# Patient Record
Sex: MALE | Race: WHITE | NOT HISPANIC OR LATINO | Employment: OTHER | URBAN - METROPOLITAN AREA
[De-identification: names, ages, dates, MRNs, and addresses within clinical notes are randomized per-mention and may not be internally consistent; named-entity substitution may affect disease eponyms.]

---

## 2017-05-05 PROBLEM — E66.9 MILD OBESITY: Status: ACTIVE | Noted: 2017-05-05

## 2018-01-12 ENCOUNTER — OFFICE VISIT (OUTPATIENT)
Dept: CARDIOLOGY | Facility: CLINIC | Age: 68
End: 2018-01-12
Attending: INTERNAL MEDICINE
Payer: OTHER MISCELLANEOUS

## 2018-01-12 VITALS
BODY MASS INDEX: 30.66 KG/M2 | SYSTOLIC BLOOD PRESSURE: 119 MMHG | HEART RATE: 57 BPM | WEIGHT: 207 LBS | HEIGHT: 69 IN | DIASTOLIC BLOOD PRESSURE: 61 MMHG

## 2018-01-12 DIAGNOSIS — I10 ESSENTIAL HYPERTENSION: ICD-10-CM

## 2018-01-12 DIAGNOSIS — I25.10 CORONARY ARTERY DISEASE INVOLVING NATIVE CORONARY ARTERY OF NATIVE HEART WITHOUT ANGINA PECTORIS: ICD-10-CM

## 2018-01-12 DIAGNOSIS — I44.7 LEFT BUNDLE BRANCH BLOCK: ICD-10-CM

## 2018-01-12 DIAGNOSIS — K21.9 GASTROESOPHAGEAL REFLUX DISEASE WITHOUT ESOPHAGITIS: ICD-10-CM

## 2018-01-12 DIAGNOSIS — E66.9 MILD OBESITY: ICD-10-CM

## 2018-01-12 DIAGNOSIS — Z95.5 HISTORY OF CORONARY ARTERY STENT PLACEMENT: ICD-10-CM

## 2018-01-12 DIAGNOSIS — Z79.01 CHRONIC ANTICOAGULATION: ICD-10-CM

## 2018-01-12 DIAGNOSIS — E11.59 TYPE 2 DIABETES MELLITUS WITH OTHER CIRCULATORY COMPLICATION, WITHOUT LONG-TERM CURRENT USE OF INSULIN: ICD-10-CM

## 2018-01-12 DIAGNOSIS — I35.9 AORTIC VALVE DISEASE: ICD-10-CM

## 2018-01-12 DIAGNOSIS — Z95.2 HISTORY OF HEART VALVE REPLACEMENT: ICD-10-CM

## 2018-01-12 DIAGNOSIS — I50.22 HEART FAILURE, SYSTOLIC, CHRONIC: ICD-10-CM

## 2018-01-12 DIAGNOSIS — E78.00 HYPERCHOLESTEROLEMIA: ICD-10-CM

## 2018-01-12 LAB — INR PPP: 2.4 (ref 2–3)

## 2018-01-12 PROCEDURE — 85610 PROTHROMBIN TIME: CPT | Mod: ,,, | Performed by: INTERNAL MEDICINE

## 2018-01-12 PROCEDURE — 99214 OFFICE O/P EST MOD 30 MIN: CPT | Mod: S$GLB,,, | Performed by: INTERNAL MEDICINE

## 2018-01-12 NOTE — PROGRESS NOTES
Subjective:     Kanu Carrero is a 67 y.o. male with hypertension, hypercholesterolemia and diabetes mellitus type 2. He is mildly obese. He has coronary artery disease with mild left main disease and a history of a RCA intervention in 2008. He has left bundle branch block. He had moderate to severe aortic stenosis and in the fall of 2013 developed mild exertional chest pressure. He underwent cardiac catheterization on 1/30/2014 which revealed an aortic valve area of 1.1 cm2 and no obstructive coronary artery disease was seen with about 40% left main stenosis. There was mild left ventricular dysfunction. He underwent aortic valve replacement receiving a [employer] Mechanical Valve on 3/11/2014. He was then begun on warfarin with anticoagulation managed by Dr. Delgadillo. He was diagnosed with iron deficiency anemia in 10/2016 and underwent EGD and colonoscopy that he says were negative. It was decided to stop aspirin that he was then taking in addition to being anticoagulated. No exertional chest discomfort or exertional dyspnea. No palpitations or weak spells. No bleeding. Feeling well overall.    Coronary Artery Disease   Presents for follow-up visit. The disease course has been stable. Pertinent negatives include no chest pain, chest pressure, chest tightness, dizziness, leg swelling, muscle weakness, palpitations, shortness of breath or weight gain. Risk factors include diabetes, hyperlipidemia, hypertension and obesity. Risk factors do not include decreased physical activity. His past medical history is significant for CHF. There is no history of arrhythmia. The symptoms have been stable.   Congestive Heart Failure   Presents for follow-up visit. The disease course has been stable. Pertinent negatives include no abdominal pain, chest pain, chest pressure, claudication, edema, fatigue, muscle weakness, near-syncope, nocturia, orthopnea, palpitations, paroxysmal nocturnal dyspnea, shortness of breath or unexpected  weight change. The symptoms have been stable. His past medical history is significant for CAD, DM and HTN. There is no history of arrhythmia or chronic lung disease.   Hypertension   This is a chronic problem. The current episode started more than 1 year ago. The problem is unchanged. The problem is controlled (usually 120-130/60-70 mmHg at home). Pertinent negatives include no anxiety, blurred vision, chest pain, headaches, malaise/fatigue, neck pain, orthopnea, palpitations, peripheral edema, PND, shortness of breath or sweats. There is no history of chronic renal disease.   Hyperlipidemia   This is a chronic problem. The current episode started more than 1 year ago. The problem is controlled. Recent lipid tests were reviewed and are normal. Exacerbating diseases include diabetes and obesity. He has no history of chronic renal disease, hypothyroidism, liver disease or nephrotic syndrome. Pertinent negatives include no chest pain, focal sensory loss, focal weakness, leg pain, myalgias or shortness of breath.       Review of Systems   Constitution: Negative for chills, fatigue, fever, malaise/fatigue, unexpected weight change and weight gain.   HENT: Negative for hoarse voice and nosebleeds.    Eyes: Negative for blurred vision, pain, vision loss in left eye and vision loss in right eye.   Cardiovascular: Negative for chest pain, claudication, dyspnea on exertion, irregular heartbeat, leg swelling, near-syncope, orthopnea, palpitations, paroxysmal nocturnal dyspnea and syncope.   Respiratory: Negative for chest tightness, cough, hemoptysis, shortness of breath and wheezing.    Endocrine: Negative for cold intolerance and heat intolerance.   Hematologic/Lymphatic: Negative for bleeding problem. Does not bruise/bleed easily.   Skin: Negative for color change and rash.   Musculoskeletal: Negative for back pain, falls, gout, muscle weakness, myalgias and neck pain.   Gastrointestinal: Negative for abdominal pain,  "heartburn, hematemesis, hematochezia, hemorrhoids, jaundice, melena, nausea and vomiting.   Genitourinary: Negative for dysuria, hematuria and nocturia.   Neurological: Negative for dizziness, focal weakness, headaches, light-headedness, loss of balance, numbness, paresthesias and vertigo.   Psychiatric/Behavioral: Negative for altered mental status, depression and memory loss. The patient is not nervous/anxious.    Allergic/Immunologic: Negative for hives and persistent infections.       Current Outpatient Prescriptions on File Prior to Visit   Medication Sig Dispense Refill    amlodipine (NORVASC) 10 MG tablet Take 1 tablet (10 mg total) by mouth once daily. 90 tablet 3    amoxicillin (AMOXIL) 500 MG Tab   0    chlorthalidone (HYGROTEN) 25 MG Tab Take 1 tablet (25 mg total) by mouth once daily. 45 tablet 3    escitalopram oxalate (LEXAPRO) 10 MG tablet Take 10 mg by mouth once daily.  4    ferrous sulfate 325 mg (65 mg iron) Tab tablet Take 325 mg by mouth once daily.  3    metformin (GLUCOPHAGE) 500 MG tablet Take 500 mg by mouth 2 (two) times daily with meals.   4    metoprolol succinate (TOPROL-XL) 50 MG 24 hr tablet Take 1 tablet (50 mg total) by mouth once daily. 90 tablet 3    MULTIVITAMIN W-MINERALS/LUTEIN (CENTRUM SILVER ORAL) Take by mouth.        omeprazole (PRILOSEC) 20 MG capsule Take 20 mg by mouth once daily.   11    oxycodone-acetaminophen 5-325 mg (PERCOCET) 5-325 mg per tablet Take 1 tablet by mouth every 4 (four) hours as needed for Pain. 40 tablet 0    ramipril (ALTACE) 10 MG capsule Take 1 capsule (10 mg total) by mouth once daily. 90 capsule 3    rosuvastatin (CRESTOR) 10 MG tablet Take 1 tablet (10 mg total) by mouth once daily. 90 tablet 3    warfarin (COUMADIN) 3 MG tablet DOSE TO BE ADJUSTED ACCORDING TO INR. 140 tablet 3     No current facility-administered medications on file prior to visit.        /61   Pulse (!) 57   Ht 5' 9" (1.753 m)   Wt 93.9 kg (207 lb)   BMI " 30.57 kg/m²       Objective:     Physical Exam   Constitutional: He is oriented to person, place, and time. He appears well-developed and well-nourished. He does not appear ill. No distress.   HENT:   Head: Normocephalic and atraumatic.   Nose: Nose normal.   Eyes: Right eye exhibits no discharge. Left eye exhibits no discharge. Right conjunctiva is not injected. Left conjunctiva is not injected. Right pupil is round. Left pupil is round. Pupils are equal.   Neck: Neck supple. No JVD present. Carotid bruit is not present. No thyroid mass and no thyromegaly present.   Cardiovascular: Normal rate, regular rhythm and S1 normal.   No extrasystoles are present. PMI is not displaced.  Exam reveals no gallop.    Murmur heard.   Harsh midsystolic murmur is present with a grade of 3/6  at the upper right sternal border  Pulses:       Radial pulses are 2+ on the right side, and 2+ on the left side.        Dorsalis pedis pulses are 2+ on the right side, and 2+ on the left side.        Posterior tibial pulses are 2+ on the right side, and 2+ on the left side.   Mechanical S2.   Pulmonary/Chest: Effort normal and breath sounds normal.   Abdominal: Soft. Normal appearance. There is no hepatosplenomegaly. There is no tenderness.   Musculoskeletal:        Right ankle: He exhibits swelling. He exhibits no ecchymosis and no deformity.        Left ankle: He exhibits swelling. He exhibits no ecchymosis and no deformity.   Lymphadenopathy:        Head (right side): No submandibular adenopathy present.        Head (left side): No submandibular adenopathy present.     He has no cervical adenopathy.   Neurological: He is alert and oriented to person, place, and time. He is not disoriented. No cranial nerve deficit or sensory deficit.   Skin: Skin is warm, dry and intact. No rash noted. He is not diaphoretic. No cyanosis. Nails show no clubbing.   Psychiatric: He has a normal mood and affect. His speech is normal and behavior is normal.  Judgment and thought content normal. Cognition and memory are normal.        Assessment:     1. Coronary artery disease involving native coronary artery of native heart without angina pectoris    2. History of coronary artery stent placement    3. Heart failure, systolic, chronic    4. Left bundle branch block    5. Aortic valve disease    6. History of heart valve replacement    7. Chronic anticoagulation    8. Essential hypertension    9. Hypercholesterolemia    10. Type 2 diabetes mellitus with other circulatory complication, without long-term current use of insulin    11. Mild obesity    12. Gastroesophageal reflux disease without esophagitis        Plan:     1. Coronary Artery Disease   2008: [facility]: Cath: RCA: Stent.   6/8/2010: [facility] [place]: Cath: LM 40%. RCA: Stent patent. EF 40-45%.              No aspirin as he has iron deficiency anemia.              Stable.    2. Heart Failure, Systolic and Diastolic, Chronic   2005: Diagnosed. EF 40-45%.   5/22/2013: Echo: EF 50-55%.   5/28/2014: Echo: Normal left ventricular size and function. Mild LVH. Mildly dilated LA. Mechanical aortic valve - 2.4 m/s - mild AR.   9/8/2016: Echo: Normal left ventricular size and systolic function. Mild LVH. Moderate diastolic dysfunction. LBBB. Moderately dilated LA. Mechanical AVR fine - 2.7 m/s.              On ramipril 10 mg Q24 and metoprolol 50 mg Q24.              Well compensated.     3. Left Bundle Branch Block              2005: Diagnosed.   9/8/2017: SB. LBBB.  ms.              Stable.                4. Aortic Valve Disease              5/22/2013: Echo: Mild AS 3.0 m/s.             Fall 2013: Mild to moderate exertional chest pressure.              1/21/2014: Echo: Normal LV size with low normal LV function. EF 50-55%. LBBB. Moderate AS - 3.2 m/s - 1.2 cm2. Mild to moderate MR.              1/21/2014: Carotid Duplex: Mild plaquing.              1/30/2014: [facility] [place]: Cath: Severe aortic stenosis - 1.1 cm2. Mean gradient 47 mm Hg.  Mild CAD. Mild LV dysfunction with EF 50%.              3/11/2014: [facility] [place]: AVR: [employer] Mechanical Valve - 23 mm.   5/28/2014: Echo: Mechanical aortic valve - 2.4 m/s - mild AR.   On warfarin.   Knows about endocarditis prophylaxis.   Stable.    5. Chronic Anticoagulation   3/2014: Began warfarin for mechanical aortic valve. Goal INR 2-3. Anticoagulation managed by Dr. Delgadillo.    Was on aspirin 81 mg as well but stopped due to iron deficiency anemia.   INR followed.     6. Hypertension              2005: Diagnosed.   On ramipril 10 mg Q24, amlodipine 10 mg Q24, metoprolol 50 mg Q24 and chlorthalidone 12.5 mg Q24.   Leg edema resolved with diuretic.   Keeping log at home.   Well controlled at home.     7. Hypercholesterolemia   2007: Began statin.              On atorvastatin 80 mg Q24.   12/16/2016: Chol 134. HDL 35. LDL 74. .   May have had some muscle aches while on atorvastatin.    On rosuvastatin 10 mg Q24.   9/13/2017: Chol 158. HDL 33. LDL 96. .   On rosuvastatin 10 mg Q24.    Quite well controlled.   May consider rosuvastatin 20 mg Q24.    8. Diabetes Mellitus, Type 2   3/2014: Diagnosed. Complications: CAD. Medications: Oral agent.   On metformin 500 mg Q12.   Tells me well controlled.    9. Mild Obesity   5/5/2017: Weight 97 kg. BMI 32.   9/8/2017: Weight 95 kg. BMI 31.   Encouraged to lose weight.    10. Gastroesophageal Reflux Disease   2008: Diagnosed.   On omeprazole 20 mg Q24.    11. Anemia   10/2016: Diagnosed with iron deficiency anemia.   10/4/2016: EGD & Colonoscopy: Negative.   Receiving iron.    12. Primary Care              Dr. Uriah Vallecillo.    F/u 4 months.    Sharad Delgadillo M.D.

## 2018-02-16 ENCOUNTER — TELEPHONE (OUTPATIENT)
Dept: CARDIOLOGY | Facility: CLINIC | Age: 68
End: 2018-02-16

## 2018-02-16 ENCOUNTER — CLINICAL SUPPORT (OUTPATIENT)
Dept: CARDIOLOGY | Facility: CLINIC | Age: 68
End: 2018-02-16
Payer: OTHER MISCELLANEOUS

## 2018-02-16 DIAGNOSIS — Z79.01 CHRONIC ANTICOAGULATION: ICD-10-CM

## 2018-02-16 DIAGNOSIS — Z95.2 HISTORY OF HEART VALVE REPLACEMENT: Primary | ICD-10-CM

## 2018-02-16 DIAGNOSIS — Z95.2 HISTORY OF HEART VALVE REPLACEMENT: ICD-10-CM

## 2018-02-16 PROCEDURE — 85610 PROTHROMBIN TIME: CPT | Mod: ,,, | Performed by: INTERNAL MEDICINE

## 2018-02-19 LAB — INR PPP: 3.2 (ref 2–3)

## 2018-03-02 ENCOUNTER — CLINICAL SUPPORT (OUTPATIENT)
Dept: CARDIOLOGY | Facility: CLINIC | Age: 68
End: 2018-03-02
Payer: OTHER MISCELLANEOUS

## 2018-03-02 DIAGNOSIS — Z95.2 HISTORY OF HEART VALVE REPLACEMENT: ICD-10-CM

## 2018-03-02 DIAGNOSIS — Z79.01 CHRONIC ANTICOAGULATION: ICD-10-CM

## 2018-03-02 LAB — INR PPP: 2.4 (ref 2–3)

## 2018-03-02 PROCEDURE — 85610 PROTHROMBIN TIME: CPT | Mod: QW,,, | Performed by: INTERNAL MEDICINE

## 2018-03-27 DIAGNOSIS — I50.22 HEART FAILURE, SYSTOLIC, CHRONIC: ICD-10-CM

## 2018-03-27 RX ORDER — CHLORTHALIDONE 25 MG/1
25 TABLET ORAL DAILY
Qty: 90 TABLET | Refills: 3 | Status: SHIPPED | OUTPATIENT
Start: 2018-03-27 | End: 2018-06-01 | Stop reason: SDUPTHER

## 2018-04-03 ENCOUNTER — CLINICAL SUPPORT (OUTPATIENT)
Dept: CARDIOLOGY | Facility: CLINIC | Age: 68
End: 2018-04-03
Payer: OTHER MISCELLANEOUS

## 2018-04-03 DIAGNOSIS — Z79.01 CHRONIC ANTICOAGULATION: Primary | ICD-10-CM

## 2018-04-03 LAB — INR PPP: 2.1 (ref 2–3)

## 2018-04-03 PROCEDURE — 85610 PROTHROMBIN TIME: CPT | Mod: QW,,, | Performed by: INTERNAL MEDICINE

## 2018-05-02 ENCOUNTER — CLINICAL SUPPORT (OUTPATIENT)
Dept: CARDIOLOGY | Facility: CLINIC | Age: 68
End: 2018-05-02
Payer: OTHER MISCELLANEOUS

## 2018-05-02 DIAGNOSIS — Z79.01 CHRONIC ANTICOAGULATION: ICD-10-CM

## 2018-05-02 DIAGNOSIS — Z95.2 HISTORY OF HEART VALVE REPLACEMENT: ICD-10-CM

## 2018-05-02 DIAGNOSIS — Z79.01 CHRONIC ANTICOAGULATION: Primary | ICD-10-CM

## 2018-05-02 LAB — INR PPP: 2.1 (ref 2–3)

## 2018-05-02 PROCEDURE — 85610 PROTHROMBIN TIME: CPT | Mod: QW,S$GLB,, | Performed by: INTERNAL MEDICINE

## 2018-06-01 ENCOUNTER — OFFICE VISIT (OUTPATIENT)
Dept: CARDIOLOGY | Facility: CLINIC | Age: 68
End: 2018-06-01
Attending: INTERNAL MEDICINE
Payer: OTHER MISCELLANEOUS

## 2018-06-01 VITALS
DIASTOLIC BLOOD PRESSURE: 58 MMHG | HEART RATE: 49 BPM | WEIGHT: 202 LBS | HEIGHT: 69 IN | SYSTOLIC BLOOD PRESSURE: 107 MMHG | BODY MASS INDEX: 29.92 KG/M2

## 2018-06-01 DIAGNOSIS — Z95.2 HISTORY OF HEART VALVE REPLACEMENT: ICD-10-CM

## 2018-06-01 DIAGNOSIS — E78.00 HYPERCHOLESTEROLEMIA: ICD-10-CM

## 2018-06-01 DIAGNOSIS — E11.59 TYPE 2 DIABETES MELLITUS WITH OTHER CIRCULATORY COMPLICATION, WITHOUT LONG-TERM CURRENT USE OF INSULIN: ICD-10-CM

## 2018-06-01 DIAGNOSIS — I44.7 LEFT BUNDLE BRANCH BLOCK: ICD-10-CM

## 2018-06-01 DIAGNOSIS — Z79.01 CHRONIC ANTICOAGULATION: ICD-10-CM

## 2018-06-01 DIAGNOSIS — I10 ESSENTIAL HYPERTENSION: ICD-10-CM

## 2018-06-01 DIAGNOSIS — E66.3 OVERWEIGHT: ICD-10-CM

## 2018-06-01 DIAGNOSIS — I50.22 HEART FAILURE, SYSTOLIC, CHRONIC: ICD-10-CM

## 2018-06-01 DIAGNOSIS — I50.22 CHRONIC SYSTOLIC CONGESTIVE HEART FAILURE: ICD-10-CM

## 2018-06-01 DIAGNOSIS — K21.9 GASTROESOPHAGEAL REFLUX DISEASE WITHOUT ESOPHAGITIS: ICD-10-CM

## 2018-06-01 DIAGNOSIS — I35.9 AORTIC VALVE DISEASE: ICD-10-CM

## 2018-06-01 DIAGNOSIS — I25.10 CORONARY ARTERY DISEASE INVOLVING NATIVE CORONARY ARTERY OF NATIVE HEART WITHOUT ANGINA PECTORIS: ICD-10-CM

## 2018-06-01 DIAGNOSIS — Z95.5 HISTORY OF CORONARY ARTERY STENT PLACEMENT: ICD-10-CM

## 2018-06-01 LAB — INR PPP: 1.9 (ref 2–3)

## 2018-06-01 PROCEDURE — 85610 PROTHROMBIN TIME: CPT | Mod: QW,,, | Performed by: INTERNAL MEDICINE

## 2018-06-01 PROCEDURE — 99214 OFFICE O/P EST MOD 30 MIN: CPT | Mod: 25,S$GLB,, | Performed by: INTERNAL MEDICINE

## 2018-06-01 RX ORDER — CHLORTHALIDONE 25 MG/1
25 TABLET ORAL DAILY
Qty: 90 TABLET | Refills: 3 | Status: SHIPPED | OUTPATIENT
Start: 2018-06-01 | End: 2018-10-19 | Stop reason: SDUPTHER

## 2018-06-01 RX ORDER — WARFARIN 3 MG/1
TABLET ORAL
Qty: 140 TABLET | Refills: 3 | Status: SHIPPED | OUTPATIENT
Start: 2018-06-01 | End: 2019-06-18 | Stop reason: SDUPTHER

## 2018-06-01 RX ORDER — RAMIPRIL 10 MG/1
10 CAPSULE ORAL DAILY
Qty: 90 CAPSULE | Refills: 3 | Status: SHIPPED | OUTPATIENT
Start: 2018-06-01 | End: 2018-06-01 | Stop reason: SDUPTHER

## 2018-06-01 RX ORDER — AMLODIPINE BESYLATE 10 MG/1
10 TABLET ORAL DAILY
Qty: 90 TABLET | Refills: 3 | Status: SHIPPED | OUTPATIENT
Start: 2018-06-01 | End: 2018-10-19 | Stop reason: SDUPTHER

## 2018-06-01 RX ORDER — ROSUVASTATIN CALCIUM 20 MG/1
20 TABLET, COATED ORAL DAILY
Qty: 90 TABLET | Refills: 3 | Status: SHIPPED | OUTPATIENT
Start: 2018-06-01 | End: 2018-10-19 | Stop reason: SDUPTHER

## 2018-06-01 RX ORDER — AMLODIPINE BESYLATE 10 MG/1
10 TABLET ORAL DAILY
Qty: 90 TABLET | Refills: 3 | Status: SHIPPED | OUTPATIENT
Start: 2018-06-01 | End: 2018-06-01 | Stop reason: SDUPTHER

## 2018-06-01 RX ORDER — METOPROLOL SUCCINATE 50 MG/1
50 TABLET, EXTENDED RELEASE ORAL DAILY
Qty: 90 TABLET | Refills: 3 | Status: SHIPPED | OUTPATIENT
Start: 2018-06-01 | End: 2018-10-17 | Stop reason: SDUPTHER

## 2018-06-01 RX ORDER — ROSUVASTATIN CALCIUM 20 MG/1
20 TABLET, COATED ORAL DAILY
Qty: 90 TABLET | Refills: 3 | Status: SHIPPED | OUTPATIENT
Start: 2018-06-01 | End: 2018-06-01 | Stop reason: SDUPTHER

## 2018-06-01 RX ORDER — CHLORTHALIDONE 25 MG/1
25 TABLET ORAL DAILY
Qty: 90 TABLET | Refills: 3 | Status: SHIPPED | OUTPATIENT
Start: 2018-06-01 | End: 2018-06-01 | Stop reason: SDUPTHER

## 2018-06-01 RX ORDER — METOPROLOL SUCCINATE 50 MG/1
50 TABLET, EXTENDED RELEASE ORAL DAILY
Qty: 90 TABLET | Refills: 3 | Status: SHIPPED | OUTPATIENT
Start: 2018-06-01 | End: 2018-06-01 | Stop reason: SDUPTHER

## 2018-06-01 RX ORDER — WARFARIN 3 MG/1
TABLET ORAL
Qty: 140 TABLET | Refills: 3 | Status: SHIPPED | OUTPATIENT
Start: 2018-06-01 | End: 2018-06-01 | Stop reason: SDUPTHER

## 2018-06-01 RX ORDER — RAMIPRIL 10 MG/1
10 CAPSULE ORAL DAILY
Qty: 90 CAPSULE | Refills: 3 | Status: SHIPPED | OUTPATIENT
Start: 2018-06-01 | End: 2018-10-17 | Stop reason: SDUPTHER

## 2018-06-01 NOTE — PROGRESS NOTES
Subjective:     Kanu Carrero is a 68 y.o. male with hypertension, hypercholesterolemia and diabetes mellitus type 2. He is overweight but used to be mildly obese. He has coronary artery disease with mild left main disease and a history of a RCA intervention in 2008. He has left bundle branch block. He had moderate to severe aortic stenosis and in the fall of 2013 developed mild exertional chest pressure. He underwent cardiac catheterization on 1/30/2014 which revealed an aortic valve area of 1.1 cm2 and no obstructive coronary artery disease was seen with about 40% left main stenosis. There was mild left ventricular dysfunction. He underwent aortic valve replacement receiving a [employer] Mechanical Valve on 3/11/2014. He was then begun on warfarin with anticoagulation managed by Dr. Delgadillo. He was diagnosed with iron deficiency anemia in 10/2016 and underwent EGD and colonoscopy that he says were negative. It was decided to stop aspirin that he was then taking in addition to being anticoagulated. No exertional chest discomfort or exertional dyspnea. No palpitations or weak spells. No bleeding. Feeling well overall.    Coronary Artery Disease   Presents for follow-up visit. The disease course has been stable. Pertinent negatives include no chest pain, chest pressure, chest tightness, dizziness, leg swelling, muscle weakness, palpitations, shortness of breath or weight gain. Risk factors include diabetes, hyperlipidemia, hypertension and obesity. Risk factors do not include decreased physical activity. His past medical history is significant for CHF. There is no history of arrhythmia. The symptoms have been stable.   Congestive Heart Failure   Presents for follow-up visit. The disease course has been stable. Pertinent negatives include no abdominal pain, chest pain, chest pressure, claudication, edema, fatigue, muscle weakness, near-syncope, nocturia, orthopnea, palpitations, paroxysmal nocturnal dyspnea,  shortness of breath or unexpected weight change. The symptoms have been stable. His past medical history is significant for CAD, DM and HTN. There is no history of arrhythmia or chronic lung disease.   Hypertension   This is a chronic problem. The current episode started more than 1 year ago. The problem is unchanged. The problem is controlled (usually 110-120/55-60 mmHg at home). Pertinent negatives include no anxiety, blurred vision, chest pain, headaches, malaise/fatigue, neck pain, orthopnea, palpitations, peripheral edema, PND, shortness of breath or sweats. There is no history of chronic renal disease.   Hyperlipidemia   This is a chronic problem. The current episode started more than 1 year ago. The problem is controlled. Recent lipid tests were reviewed and are normal. Exacerbating diseases include diabetes and obesity. He has no history of chronic renal disease, hypothyroidism, liver disease or nephrotic syndrome. Pertinent negatives include no chest pain, focal sensory loss, focal weakness, leg pain, myalgias or shortness of breath.       Review of Systems   Constitution: Negative for chills, fatigue, fever, malaise/fatigue, unexpected weight change and weight gain.   HENT: Negative for hoarse voice and nosebleeds.    Eyes: Negative for blurred vision, pain, vision loss in left eye and vision loss in right eye.   Cardiovascular: Negative for chest pain, claudication, dyspnea on exertion, irregular heartbeat, leg swelling, near-syncope, orthopnea, palpitations, paroxysmal nocturnal dyspnea and syncope.   Respiratory: Negative for chest tightness, cough, hemoptysis, shortness of breath and wheezing.    Endocrine: Negative for cold intolerance and heat intolerance.   Hematologic/Lymphatic: Negative for bleeding problem. Does not bruise/bleed easily.   Skin: Negative for color change and rash.   Musculoskeletal: Negative for back pain, falls, gout, muscle weakness, myalgias and neck pain.   Gastrointestinal:  "Negative for abdominal pain, heartburn, hematemesis, hematochezia, hemorrhoids, jaundice, melena, nausea and vomiting.   Genitourinary: Negative for dysuria, hematuria and nocturia.   Neurological: Negative for dizziness, focal weakness, headaches, light-headedness, loss of balance, numbness, paresthesias and vertigo.   Psychiatric/Behavioral: Negative for altered mental status, depression and memory loss. The patient is not nervous/anxious.    Allergic/Immunologic: Negative for hives and persistent infections.       Current Outpatient Prescriptions on File Prior to Visit   Medication Sig Dispense Refill    amlodipine (NORVASC) 10 MG tablet Take 1 tablet (10 mg total) by mouth once daily. 90 tablet 3    amoxicillin (AMOXIL) 500 MG Tab   0    chlorthalidone (HYGROTEN) 25 MG Tab Take 1 tablet (25 mg total) by mouth once daily. 90 tablet 3    escitalopram oxalate (LEXAPRO) 10 MG tablet Take 10 mg by mouth once daily.  4    ferrous sulfate 325 mg (65 mg iron) Tab tablet Take 325 mg by mouth once daily.  3    metformin (GLUCOPHAGE) 500 MG tablet Take 500 mg by mouth 2 (two) times daily with meals.   4    metoprolol succinate (TOPROL-XL) 50 MG 24 hr tablet Take 1 tablet (50 mg total) by mouth once daily. 90 tablet 3    MULTIVITAMIN W-MINERALS/LUTEIN (CENTRUM SILVER ORAL) Take by mouth.        omeprazole (PRILOSEC) 20 MG capsule Take 20 mg by mouth once daily.   11    oxycodone-acetaminophen 5-325 mg (PERCOCET) 5-325 mg per tablet Take 1 tablet by mouth every 4 (four) hours as needed for Pain. 40 tablet 0    ramipril (ALTACE) 10 MG capsule Take 1 capsule (10 mg total) by mouth once daily. 90 capsule 3    rosuvastatin (CRESTOR) 10 MG tablet Take 1 tablet (10 mg total) by mouth once daily. 90 tablet 3    warfarin (COUMADIN) 3 MG tablet DOSE TO BE ADJUSTED ACCORDING TO INR. 140 tablet 3     No current facility-administered medications on file prior to visit.        BP (!) 107/58   Pulse (!) 49   Ht 5' 9" (1.753 " m)   Wt 91.6 kg (202 lb)   BMI 29.83 kg/m²       Objective:     Physical Exam   Constitutional: He is oriented to person, place, and time. He appears well-developed and well-nourished. He does not appear ill. No distress.   HENT:   Head: Normocephalic and atraumatic.   Nose: Nose normal.   Eyes: Right eye exhibits no discharge. Left eye exhibits no discharge. Right conjunctiva is not injected. Left conjunctiva is not injected. Right pupil is round. Left pupil is round. Pupils are equal.   Neck: Neck supple. No JVD present. Carotid bruit is not present. No thyroid mass and no thyromegaly present.   Cardiovascular: Normal rate, regular rhythm and S1 normal.   No extrasystoles are present. PMI is not displaced.  Exam reveals no gallop.    Murmur heard.   Harsh midsystolic murmur is present with a grade of 3/6  at the upper right sternal border  Pulses:       Radial pulses are 2+ on the right side, and 2+ on the left side.        Dorsalis pedis pulses are 2+ on the right side, and 2+ on the left side.        Posterior tibial pulses are 2+ on the right side, and 2+ on the left side.   Mechanical S2.   Pulmonary/Chest: Effort normal and breath sounds normal.   Abdominal: Soft. Normal appearance. There is no hepatosplenomegaly. There is no tenderness.   Musculoskeletal:        Right ankle: He exhibits swelling. He exhibits no ecchymosis and no deformity.        Left ankle: He exhibits swelling. He exhibits no ecchymosis and no deformity.   Lymphadenopathy:        Head (right side): No submandibular adenopathy present.        Head (left side): No submandibular adenopathy present.     He has no cervical adenopathy.   Neurological: He is alert and oriented to person, place, and time. He is not disoriented. No cranial nerve deficit.   Skin: Skin is warm, dry and intact. No rash noted. He is not diaphoretic. Nails show no clubbing.   Psychiatric: He has a normal mood and affect. His speech is normal and behavior is normal.  Judgment and thought content normal. Cognition and memory are normal.        Assessment:     1. Coronary artery disease involving native coronary artery of native heart without angina pectoris    2. History of coronary artery stent placement    3. Heart failure, systolic, chronic    4. Left bundle branch block    5. Aortic valve disease    6. Chronic anticoagulation    7. History of heart valve replacement    8. Essential hypertension    9. Hypercholesterolemia    10. Type 2 diabetes mellitus with other circulatory complication, without long-term current use of insulin    11. Overweight    12. Gastroesophageal reflux disease without esophagitis        Plan:     1. Coronary Artery Disease   2008: [facility]: Cath: RCA: Stent.   6/8/2010: [facility] [place]: [place]: LM 40%. RCA: Stent patent. EF 40-45%.              No aspirin as he has iron deficiency anemia.              Stable.    2. Heart Failure, Systolic and Diastolic, Chronic   2005: Diagnosed. EF 40-45%.   5/22/2013: Echo: EF 50-55%.   5/28/2014: Echo: Normal left ventricular size and function. Mild LVH. Mildly dilated LA. Mechanical aortic valve - 2.4 m/s - mild AR.   9/8/2016: Echo: Normal left ventricular size and systolic function. Mild LVH. Moderate diastolic dysfunction. LBBB. Moderately dilated LA. Mechanical AVR fine - 2.7 m/s.              On ramipril 10 mg Q24 and metoprolol 50 mg Q24.              Well compensated.     3. Left Bundle Branch Block              2005: Diagnosed.   9/8/2017: SB. LBBB.  ms.              Stable.                4. Aortic Valve Disease              5/22/2013: Echo: Mild AS 3.0 m/s.             Fall 2013: Mild to moderate exertional chest pressure.              1/21/2014: Echo: Normal LV size with low normal LV function. EF 50-55%. LBBB. Moderate AS - 3.2 m/s - 1.2 cm2. Mild to moderate MR.              1/21/2014: Carotid Duplex: Mild plaquing.              1/30/2014: [facility] [place]: Cath: Severe aortic stenosis - 1.1 cm2. Mean gradient 47 mm Hg.  Mild CAD. Mild LV dysfunction with EF 50%.              3/11/2014: [facility] [place]: AVR: [employer] Mechanical Valve - 23 mm.   5/28/2014: Echo: Mechanical aortic valve - 2.4 m/s - mild AR.   On warfarin.   Knows about endocarditis prophylaxis.   Stable.    5. Chronic Anticoagulation   3/2014: Began warfarin for mechanical aortic valve. Goal INR 2-3. Anticoagulation managed by Dr. Delgadillo.    Was on aspirin 81 mg as well but stopped due to iron deficiency anemia.   6/1/2018: INR 1.9: Increase 4.5 mg 4/7 & 3 mg 3/7. Check INR 2 weeks.   INR followed.    No obvious bleeding.    6. Hypertension              2005: Diagnosed.   On ramipril 10 mg Q24, amlodipine 10 mg Q24, metoprolol 50 mg Q24 and chlorthalidone 12.5 mg Q24.   Leg edema resolved with diuretic.   Keeping log at home.   Well controlled at home.     7. Hypercholesterolemia   2007: Began statin.              On atorvastatin 80 mg Q24.   12/16/2016: Chol 134. HDL 35. LDL 74. .   May have had some muscle aches while on atorvastatin.    On rosuvastatin 10 mg Q24.   9/13/2017: Chol 158. HDL 33. LDL 96. .   On rosuvastatin 10 mg Q24.   Increase rosuvastatin to 20 mg Q24.    Quite well controlled.     8. Diabetes Mellitus, Type 2   3/2014: Diagnosed. Complications: CAD. Medications: Oral agent.   On metformin 500 mg Q12.   Tells me well controlled.    9. Overweight   5/5/2017: Weight 97 kg. BMI 32.   9/8/2017: Weight 95 kg. BMI 31.   6/1/2018: Weight 92 kg. BMI 30.    Encouraged to lose more weight.    10. Gastroesophageal Reflux Disease   2008: Diagnosed.   On omeprazole 20 mg Q24.    11. Anemia   10/2016: Diagnosed with iron deficiency anemia.   10/4/2016: EGD & Colonoscopy: Negative.   Receiving iron.    12. Primary Care              Dr. Uriah Vallecillo.    F/u 4 months.    Sharad Delgadillo M.D.

## 2018-06-15 ENCOUNTER — CLINICAL SUPPORT (OUTPATIENT)
Dept: CARDIOLOGY | Facility: CLINIC | Age: 68
End: 2018-06-15
Payer: OTHER MISCELLANEOUS

## 2018-06-15 DIAGNOSIS — Z79.01 CHRONIC ANTICOAGULATION: Primary | ICD-10-CM

## 2018-06-15 LAB — INR PPP: 2.4 (ref 2–3)

## 2018-06-15 PROCEDURE — 85610 PROTHROMBIN TIME: CPT | Mod: QW,,, | Performed by: INTERNAL MEDICINE

## 2018-07-17 ENCOUNTER — CLINICAL SUPPORT (OUTPATIENT)
Dept: CARDIOLOGY | Facility: CLINIC | Age: 68
End: 2018-07-17
Payer: OTHER MISCELLANEOUS

## 2018-07-17 DIAGNOSIS — Z79.01 CHRONIC ANTICOAGULATION: Primary | ICD-10-CM

## 2018-07-17 LAB — INR PPP: 2.7 (ref 2–3)

## 2018-07-17 PROCEDURE — 85610 PROTHROMBIN TIME: CPT | Mod: QW,,, | Performed by: INTERNAL MEDICINE

## 2018-08-14 ENCOUNTER — CLINICAL SUPPORT (OUTPATIENT)
Dept: CARDIOLOGY | Facility: CLINIC | Age: 68
End: 2018-08-14
Payer: OTHER MISCELLANEOUS

## 2018-08-14 DIAGNOSIS — Z79.01 CHRONIC ANTICOAGULATION: Primary | ICD-10-CM

## 2018-08-14 LAB
INR PPP: 1.9 (ref 2–3)
INR PPP: 1.9 (ref 2–3)

## 2018-08-14 PROCEDURE — 85610 PROTHROMBIN TIME: CPT | Mod: QW,,, | Performed by: INTERNAL MEDICINE

## 2018-09-11 ENCOUNTER — CLINICAL SUPPORT (OUTPATIENT)
Dept: CARDIOLOGY | Facility: CLINIC | Age: 68
End: 2018-09-11
Payer: OTHER MISCELLANEOUS

## 2018-09-11 DIAGNOSIS — Z79.01 CHRONIC ANTICOAGULATION: Primary | ICD-10-CM

## 2018-09-11 LAB — INR PPP: 3.3 (ref 2–3)

## 2018-09-11 PROCEDURE — 85610 PROTHROMBIN TIME: CPT | Mod: QW,,, | Performed by: INTERNAL MEDICINE

## 2018-09-25 ENCOUNTER — CLINICAL SUPPORT (OUTPATIENT)
Dept: CARDIOLOGY | Facility: CLINIC | Age: 68
End: 2018-09-25
Payer: OTHER MISCELLANEOUS

## 2018-09-25 DIAGNOSIS — Z79.01 CHRONIC ANTICOAGULATION: Primary | ICD-10-CM

## 2018-09-25 LAB — INR PPP: 2.1 (ref 2–3)

## 2018-09-25 PROCEDURE — 85610 PROTHROMBIN TIME: CPT | Mod: QW,,, | Performed by: INTERNAL MEDICINE

## 2018-10-17 DIAGNOSIS — I50.22 HEART FAILURE, SYSTOLIC, CHRONIC: ICD-10-CM

## 2018-10-17 DIAGNOSIS — I10 ESSENTIAL HYPERTENSION: ICD-10-CM

## 2018-10-17 RX ORDER — ROSUVASTATIN CALCIUM 10 MG/1
TABLET, COATED ORAL
Qty: 90 TABLET | Refills: 3 | Status: SHIPPED | OUTPATIENT
Start: 2018-10-17 | End: 2018-10-19

## 2018-10-17 RX ORDER — METOPROLOL SUCCINATE 50 MG/1
TABLET, EXTENDED RELEASE ORAL
Qty: 90 TABLET | Refills: 3 | Status: SHIPPED | OUTPATIENT
Start: 2018-10-17 | End: 2018-10-19 | Stop reason: SDUPTHER

## 2018-10-17 RX ORDER — RAMIPRIL 10 MG/1
CAPSULE ORAL
Qty: 90 CAPSULE | Refills: 3 | Status: SHIPPED | OUTPATIENT
Start: 2018-10-17 | End: 2018-10-19 | Stop reason: SDUPTHER

## 2018-10-19 ENCOUNTER — OFFICE VISIT (OUTPATIENT)
Dept: CARDIOLOGY | Facility: CLINIC | Age: 68
End: 2018-10-19
Attending: INTERNAL MEDICINE
Payer: OTHER MISCELLANEOUS

## 2018-10-19 VITALS
HEART RATE: 53 BPM | SYSTOLIC BLOOD PRESSURE: 102 MMHG | DIASTOLIC BLOOD PRESSURE: 54 MMHG | HEIGHT: 69 IN | WEIGHT: 197 LBS | BODY MASS INDEX: 29.18 KG/M2

## 2018-10-19 DIAGNOSIS — E78.00 HYPERCHOLESTEROLEMIA: ICD-10-CM

## 2018-10-19 DIAGNOSIS — Z95.5 HISTORY OF CORONARY ARTERY STENT PLACEMENT: ICD-10-CM

## 2018-10-19 DIAGNOSIS — I25.10 CORONARY ARTERY DISEASE INVOLVING NATIVE CORONARY ARTERY OF NATIVE HEART WITHOUT ANGINA PECTORIS: ICD-10-CM

## 2018-10-19 DIAGNOSIS — K21.9 GASTROESOPHAGEAL REFLUX DISEASE WITHOUT ESOPHAGITIS: ICD-10-CM

## 2018-10-19 DIAGNOSIS — I35.9 AORTIC VALVE DISEASE: ICD-10-CM

## 2018-10-19 DIAGNOSIS — I10 ESSENTIAL HYPERTENSION: ICD-10-CM

## 2018-10-19 DIAGNOSIS — E66.3 OVERWEIGHT: ICD-10-CM

## 2018-10-19 DIAGNOSIS — I50.22 HEART FAILURE, SYSTOLIC, CHRONIC: ICD-10-CM

## 2018-10-19 DIAGNOSIS — I44.7 LEFT BUNDLE BRANCH BLOCK: ICD-10-CM

## 2018-10-19 DIAGNOSIS — Z95.2 HISTORY OF HEART VALVE REPLACEMENT: ICD-10-CM

## 2018-10-19 DIAGNOSIS — Z79.01 CHRONIC ANTICOAGULATION: ICD-10-CM

## 2018-10-19 DIAGNOSIS — E11.59 TYPE 2 DIABETES MELLITUS WITH OTHER CIRCULATORY COMPLICATION, WITHOUT LONG-TERM CURRENT USE OF INSULIN: ICD-10-CM

## 2018-10-19 LAB — INR PPP: 2.8 (ref 2–3)

## 2018-10-19 PROCEDURE — 85610 PROTHROMBIN TIME: CPT | Mod: QW,,, | Performed by: INTERNAL MEDICINE

## 2018-10-19 PROCEDURE — 93000 ELECTROCARDIOGRAM COMPLETE: CPT | Mod: S$GLB,,, | Performed by: INTERNAL MEDICINE

## 2018-10-19 PROCEDURE — 99214 OFFICE O/P EST MOD 30 MIN: CPT | Mod: S$GLB,,, | Performed by: INTERNAL MEDICINE

## 2018-10-19 RX ORDER — CHLORTHALIDONE 25 MG/1
25 TABLET ORAL DAILY
Qty: 90 TABLET | Refills: 3 | Status: SHIPPED | OUTPATIENT
Start: 2018-10-19 | End: 2019-11-01 | Stop reason: SDUPTHER

## 2018-10-19 RX ORDER — AMLODIPINE BESYLATE 10 MG/1
10 TABLET ORAL DAILY
Qty: 90 TABLET | Refills: 3 | Status: SHIPPED | OUTPATIENT
Start: 2018-10-19 | End: 2019-03-30

## 2018-10-19 RX ORDER — ROSUVASTATIN CALCIUM 20 MG/1
20 TABLET, COATED ORAL DAILY
Qty: 90 TABLET | Refills: 3 | Status: SHIPPED | OUTPATIENT
Start: 2018-10-19 | End: 2019-11-01 | Stop reason: SDUPTHER

## 2018-10-19 RX ORDER — RAMIPRIL 10 MG/1
10 CAPSULE ORAL DAILY
Qty: 90 CAPSULE | Refills: 3 | Status: SHIPPED | OUTPATIENT
Start: 2018-10-19 | End: 2019-11-01 | Stop reason: SDUPTHER

## 2018-10-19 RX ORDER — METOPROLOL SUCCINATE 50 MG/1
50 TABLET, EXTENDED RELEASE ORAL DAILY
Qty: 90 TABLET | Refills: 3 | Status: SHIPPED | OUTPATIENT
Start: 2018-10-19 | End: 2019-03-01

## 2018-10-19 NOTE — PROGRESS NOTES
Subjective:     Kanu Carrero is a 68 y.o. male with hypertension, hypercholesterolemia and diabetes mellitus type 2. He is overweight but used to be mildly obese. He has coronary artery disease with mild left main disease and a history of a RCA intervention in 2008. He has left bundle branch block. He had moderate to severe aortic stenosis and in the fall of 2013 developed mild exertional chest pressure. He underwent cardiac catheterization on 1/30/2014 which revealed an aortic valve area of 1.1 cm2 and no obstructive coronary artery disease was seen with about 40% left main stenosis. There was mild left ventricular dysfunction. He underwent aortic valve replacement receiving a [employer] Mechanical Valve on 3/11/2014. He was then begun on warfarin with anticoagulation managed by Dr. Delgadillo. He was diagnosed with iron deficiency anemia in 10/2016 and underwent EGD and colonoscopy that he says were negative. It was decided to stop aspirin that he was then taking in addition to being anticoagulated. No exertional chest discomfort or exertional dyspnea. No palpitations or weak spells. No bleeding. Feeling well overall.    Coronary Artery Disease   Presents for follow-up visit. The disease course has been stable. Pertinent negatives include no chest pain, chest pressure, chest tightness, dizziness, leg swelling, muscle weakness, palpitations, shortness of breath or weight gain. Risk factors include diabetes, hyperlipidemia, hypertension and obesity. Risk factors do not include decreased physical activity. His past medical history is significant for CHF. There is no history of arrhythmia. The symptoms have been stable.   Congestive Heart Failure   Presents for follow-up visit. The disease course has been stable. Pertinent negatives include no abdominal pain, chest pain, chest pressure, claudication, edema, fatigue, muscle weakness, near-syncope, nocturia, orthopnea, palpitations, paroxysmal nocturnal dyspnea,  shortness of breath or unexpected weight change. The symptoms have been stable. His past medical history is significant for CAD, DM and HTN. There is no history of arrhythmia or chronic lung disease.   Hypertension   This is a chronic problem. The current episode started more than 1 year ago. The problem is unchanged. The problem is controlled (usually 115-125/55-60 mmHg at home). Pertinent negatives include no anxiety, blurred vision, chest pain, headaches, malaise/fatigue, neck pain, orthopnea, palpitations, peripheral edema, PND, shortness of breath or sweats. There is no history of chronic renal disease.   Hyperlipidemia   This is a chronic problem. The current episode started more than 1 year ago. The problem is controlled. Recent lipid tests were reviewed and are normal. Exacerbating diseases include diabetes and obesity. He has no history of chronic renal disease, hypothyroidism, liver disease or nephrotic syndrome. Pertinent negatives include no chest pain, focal sensory loss, focal weakness, leg pain, myalgias or shortness of breath.       Review of Systems   Constitution: Negative for chills, fatigue, fever, malaise/fatigue, unexpected weight change and weight gain.   HENT: Negative for hoarse voice and nosebleeds.    Eyes: Negative for blurred vision, pain, vision loss in left eye and vision loss in right eye.   Cardiovascular: Negative for chest pain, claudication, dyspnea on exertion, irregular heartbeat, leg swelling, near-syncope, orthopnea, palpitations, paroxysmal nocturnal dyspnea and syncope.   Respiratory: Negative for chest tightness, cough, hemoptysis, shortness of breath and wheezing.    Endocrine: Negative for cold intolerance and heat intolerance.   Hematologic/Lymphatic: Negative for bleeding problem. Does not bruise/bleed easily.   Skin: Negative for color change and rash.   Musculoskeletal: Negative for back pain, falls, gout, muscle weakness, myalgias and neck pain.   Gastrointestinal:  Negative for abdominal pain, heartburn, hematemesis, hematochezia, hemorrhoids, jaundice, melena, nausea and vomiting.   Genitourinary: Negative for dysuria, hematuria and nocturia.   Neurological: Negative for dizziness, focal weakness, headaches, light-headedness, loss of balance, numbness, paresthesias and vertigo.   Psychiatric/Behavioral: Negative for altered mental status, depression and memory loss. The patient is not nervous/anxious.    Allergic/Immunologic: Negative for hives and persistent infections.       Current Outpatient Medications on File Prior to Visit   Medication Sig Dispense Refill    amLODIPine (NORVASC) 10 MG tablet Take 1 tablet (10 mg total) by mouth once daily. 90 tablet 3    amoxicillin (AMOXIL) 500 MG Tab   0    chlorthalidone (HYGROTEN) 25 MG Tab Take 1 tablet (25 mg total) by mouth once daily. 90 tablet 3    escitalopram oxalate (LEXAPRO) 10 MG tablet Take 10 mg by mouth once daily.  4    ferrous sulfate 325 mg (65 mg iron) Tab tablet Take 325 mg by mouth once daily.  3    metformin (GLUCOPHAGE) 500 MG tablet Take 500 mg by mouth 2 (two) times daily with meals.   4    metoprolol succinate (TOPROL-XL) 50 MG 24 hr tablet TAKE 1 TABLET DAILY 90 tablet 3    MULTIVITAMIN W-MINERALS/LUTEIN (CENTRUM SILVER ORAL) Take by mouth.        omeprazole (PRILOSEC) 20 MG capsule Take 20 mg by mouth once daily.   11    oxycodone-acetaminophen 5-325 mg (PERCOCET) 5-325 mg per tablet Take 1 tablet by mouth every 4 (four) hours as needed for Pain. 40 tablet 0    ramipril (ALTACE) 10 MG capsule TAKE 1 CAPSULE DAILY 90 capsule 3    rosuvastatin (CRESTOR) 10 MG tablet TAKE 1 TABLET DAILY 90 tablet 3    rosuvastatin (CRESTOR) 20 MG tablet Take 1 tablet (20 mg total) by mouth once daily. 90 tablet 3    warfarin (COUMADIN) 3 MG tablet DOSE TO BE ADJUSTED ACCORDING TO INR. 140 tablet 3     No current facility-administered medications on file prior to visit.        BP (!) 102/54   Pulse (!) 53   Ht  "5' 9" (1.753 m)   Wt 89.4 kg (197 lb)   BMI 29.09 kg/m²       Objective:     Physical Exam   Constitutional: He is oriented to person, place, and time. He appears well-developed and well-nourished. He does not appear ill. No distress.   HENT:   Head: Normocephalic and atraumatic.   Nose: Nose normal.   Eyes: Right eye exhibits no discharge. Left eye exhibits no discharge. Right conjunctiva is not injected. Left conjunctiva is not injected. Right pupil is round. Left pupil is round. Pupils are equal.   Neck: Neck supple. No JVD present. Carotid bruit is not present. No thyroid mass and no thyromegaly present.   Cardiovascular: Normal rate, regular rhythm and S1 normal.  No extrasystoles are present. PMI is not displaced. Exam reveals no gallop.   Murmur heard.   Harsh midsystolic murmur is present with a grade of 3/6 at the upper right sternal border.  Pulses:       Radial pulses are 2+ on the right side, and 2+ on the left side.        Dorsalis pedis pulses are 2+ on the right side, and 2+ on the left side.        Posterior tibial pulses are 2+ on the right side, and 2+ on the left side.   Mechanical S2.   Pulmonary/Chest: Effort normal and breath sounds normal.   Abdominal: Soft. Normal appearance. There is no hepatosplenomegaly. There is no tenderness.   Musculoskeletal:        Right ankle: He exhibits swelling. He exhibits no ecchymosis and no deformity.        Left ankle: He exhibits swelling. He exhibits no ecchymosis and no deformity.   Lymphadenopathy:        Head (right side): No submandibular adenopathy present.        Head (left side): No submandibular adenopathy present.     He has no cervical adenopathy.   Neurological: He is alert and oriented to person, place, and time. He is not disoriented. No cranial nerve deficit.   Skin: Skin is warm, dry and intact. No rash noted. He is not diaphoretic. Nails show no clubbing.   Psychiatric: He has a normal mood and affect. His speech is normal and behavior is " normal. Judgment and thought content normal. Cognition and memory are normal.        Assessment:     1. Coronary artery disease involving native coronary artery of native heart without angina pectoris    2. History of coronary artery stent placement    3. Heart failure, systolic, chronic    4. Left bundle branch block    5. Chronic anticoagulation    6. Aortic valve disease    7. History of heart valve replacement    8. Essential hypertension    9. Hypercholesterolemia    10. Type 2 diabetes mellitus with other circulatory complication, without long-term current use of insulin    11. Overweight    12. Gastroesophageal reflux disease without esophagitis        Plan:     1. Coronary Artery Disease   2008: [facility]: Cath: RCA: Stent.   [place] [facility] [place] LM 40%. RCA: Stent patent. EF 40-45%.              No aspirin as he has iron deficiency anemia.              Stable.    2. Heart Failure, Systolic and Diastolic, Chronic   2005: Diagnosed. EF 40-45%.   5/22/2013: Echo: EF 50-55%.   5/28/2014: Echo: Normal left ventricular size and function. Mild LVH. Mildly dilated LA. Mechanical aortic valve - 2.4 m/s - mild AR.   9/8/2016: Echo: Normal left ventricular size and systolic function. Mild LVH. Moderate diastolic dysfunction. LBBB. Moderately dilated LA. Mechanical AVR fine - 2.7 m/s.              On ramipril 10 mg Q24 and metoprolol 50 mg Q24.              Well compensated.     3. Left Bundle Branch Block              2005: Diagnosed.   9/8/2017: SB. LBBB.  ms.   10/19/2018: SB 52. LBBB with  msec.              Stable.                4. Aortic Valve Disease              5/22/2013: Echo: Mild AS 3.0 m/s.             Fall 2013: Mild to moderate exertional chest pressure.              1/21/2014: Echo: Normal LV size with low normal LV function. EF 50-55%. LBBB. Moderate AS - 3.2 m/s - 1.2 cm2. Mild to moderate MR.              1/21/2014: Carotid Duplex: Mild plaquing.              [place] [facility] [place]: Severe  aortic stenosis - 1.1 cm2. Mean gradient 47 mm Hg. Mild CAD. Mild LV dysfunction with EF 50%.              [place] [facility] [place]: [employer] Mechanical Valve - 23 mm.   5/28/2014: Echo: Mechanical aortic valve - 2.4 m/s - mild AR.   On warfarin.   Knows about endocarditis prophylaxis.   Stable.    5. Chronic Anticoagulation   3/2014: Began warfarin for mechanical aortic valve. Goal INR 2-3. Anticoagulation managed by Dr. Delgadillo.    Was on aspirin 81 mg as well but stopped due to iron deficiency anemia.   6/1/2018: INR 1.9: Increase 4.5 mg 4/7 & 3 mg 3/7. Check INR 2 weeks.   INR followed.    No obvious bleeding.    6. Hypertension              2005: Diagnosed.   On metoprolol 50 mg Q24, amlodipine 10 mg Q24, ramipril 10 mg Q24 and chlorthalidone 12.5 mg Q24.   Leg edema resolved with diuretic.   Keeping log at home.   Well controlled at home.     7. Hypercholesterolemia   2007: Began statin.              On atorvastatin 80 mg Q24.   12/16/2016: Chol 134. HDL 35. LDL 74. .   May have had some muscle aches while on atorvastatin.    On rosuvastatin 10 mg Q24.   9/13/2017: Chol 158. HDL 33. LDL 96. .   On rosuvastatin 20 mg Q24.   9/14/2018: Chol 153. HDL 35. LDL 92. .   On rosuvastatin 20 mg Q24.   Quite well controlled.     8. Diabetes Mellitus, Type 2   3/2014: Diagnosed. Complications: CAD. Medications: Oral agent.   On metformin 500 mg Q12.   Tells me well controlled.    9. Overweight   5/5/2017: Weight 97 kg. BMI 32.   9/8/2017: Weight 95 kg. BMI 31.   6/1/2018: Weight 92 kg. BMI 30.   10/19/2018: Weight 89 kg. BMI 29.    Encouraged to lose more weight.    10. Gastroesophageal Reflux Disease   2008: Diagnosed.   On omeprazole 20 mg Q24.    11. Anemia   10/2016: Diagnosed with iron deficiency anemia.   10/4/2016: EGD & Colonoscopy: Negative.   Receiving iron.    12. Primary Care              Dr. Uriah Vallecillo.    F/u 4 months.    Sharad Delgadillo M.D.

## 2018-11-19 ENCOUNTER — CLINICAL SUPPORT (OUTPATIENT)
Dept: CARDIOLOGY | Facility: CLINIC | Age: 68
End: 2018-11-19
Payer: OTHER MISCELLANEOUS

## 2018-11-19 DIAGNOSIS — Z79.01 CHRONIC ANTICOAGULATION: Primary | ICD-10-CM

## 2018-11-19 LAB — INR PPP: 2.9 (ref 2–3)

## 2018-11-19 PROCEDURE — 85610 PROTHROMBIN TIME: CPT | Mod: QW,,, | Performed by: INTERNAL MEDICINE

## 2019-01-15 ENCOUNTER — CLINICAL SUPPORT (OUTPATIENT)
Dept: CARDIOLOGY | Facility: CLINIC | Age: 69
End: 2019-01-15
Payer: OTHER MISCELLANEOUS

## 2019-01-15 DIAGNOSIS — Z79.01 CHRONIC ANTICOAGULATION: Primary | ICD-10-CM

## 2019-01-15 LAB — INR PPP: 2.8 (ref 2–3)

## 2019-01-15 PROCEDURE — 85610 PROTHROMBIN TIME: CPT | Mod: QW,,, | Performed by: INTERNAL MEDICINE

## 2019-01-15 PROCEDURE — 85610 POCT INR: ICD-10-PCS | Mod: QW,,, | Performed by: INTERNAL MEDICINE

## 2019-02-05 ENCOUNTER — CLINICAL SUPPORT (OUTPATIENT)
Dept: CARDIOLOGY | Facility: CLINIC | Age: 69
End: 2019-02-05
Payer: OTHER MISCELLANEOUS

## 2019-02-05 DIAGNOSIS — Z79.01 CHRONIC ANTICOAGULATION: Primary | ICD-10-CM

## 2019-02-05 LAB — INR PPP: 2.5 (ref 2–3)

## 2019-02-05 PROCEDURE — 85610 POCT INR: ICD-10-PCS | Mod: QW,,, | Performed by: INTERNAL MEDICINE

## 2019-02-05 PROCEDURE — 85610 PROTHROMBIN TIME: CPT | Mod: QW,,, | Performed by: INTERNAL MEDICINE

## 2019-03-01 ENCOUNTER — OFFICE VISIT (OUTPATIENT)
Dept: CARDIOLOGY | Facility: CLINIC | Age: 69
End: 2019-03-01
Attending: INTERNAL MEDICINE
Payer: OTHER MISCELLANEOUS

## 2019-03-01 VITALS
HEART RATE: 46 BPM | WEIGHT: 207 LBS | DIASTOLIC BLOOD PRESSURE: 67 MMHG | BODY MASS INDEX: 30.66 KG/M2 | HEIGHT: 69 IN | SYSTOLIC BLOOD PRESSURE: 119 MMHG

## 2019-03-01 DIAGNOSIS — E78.00 HYPERCHOLESTEROLEMIA: ICD-10-CM

## 2019-03-01 DIAGNOSIS — I35.9 AORTIC VALVE DISEASE: ICD-10-CM

## 2019-03-01 DIAGNOSIS — E66.3 OVERWEIGHT: ICD-10-CM

## 2019-03-01 DIAGNOSIS — Z79.01 CHRONIC ANTICOAGULATION: ICD-10-CM

## 2019-03-01 DIAGNOSIS — K21.9 GASTROESOPHAGEAL REFLUX DISEASE WITHOUT ESOPHAGITIS: ICD-10-CM

## 2019-03-01 DIAGNOSIS — I44.7 LEFT BUNDLE BRANCH BLOCK: ICD-10-CM

## 2019-03-01 DIAGNOSIS — I50.22 HEART FAILURE, SYSTOLIC, CHRONIC: ICD-10-CM

## 2019-03-01 DIAGNOSIS — I10 ESSENTIAL HYPERTENSION: ICD-10-CM

## 2019-03-01 DIAGNOSIS — Z95.5 HISTORY OF CORONARY ARTERY STENT PLACEMENT: ICD-10-CM

## 2019-03-01 DIAGNOSIS — Z95.2 HISTORY OF HEART VALVE REPLACEMENT: ICD-10-CM

## 2019-03-01 DIAGNOSIS — E11.59 TYPE 2 DIABETES MELLITUS WITH OTHER CIRCULATORY COMPLICATION, WITHOUT LONG-TERM CURRENT USE OF INSULIN: ICD-10-CM

## 2019-03-01 DIAGNOSIS — I25.10 CORONARY ARTERY DISEASE INVOLVING NATIVE CORONARY ARTERY OF NATIVE HEART WITHOUT ANGINA PECTORIS: ICD-10-CM

## 2019-03-01 LAB — INR PPP: 2.7 (ref 2–3)

## 2019-03-01 PROCEDURE — 99214 PR OFFICE/OUTPT VISIT, EST, LEVL IV, 30-39 MIN: ICD-10-PCS | Mod: 25,S$GLB,, | Performed by: INTERNAL MEDICINE

## 2019-03-01 PROCEDURE — 93000 PR ELECTROCARDIOGRAM, COMPLETE: ICD-10-PCS | Mod: S$GLB,,, | Performed by: INTERNAL MEDICINE

## 2019-03-01 PROCEDURE — 85610 PROTHROMBIN TIME: CPT | Mod: QW,,, | Performed by: INTERNAL MEDICINE

## 2019-03-01 PROCEDURE — 99214 OFFICE O/P EST MOD 30 MIN: CPT | Mod: 25,S$GLB,, | Performed by: INTERNAL MEDICINE

## 2019-03-01 PROCEDURE — 93000 ELECTROCARDIOGRAM COMPLETE: CPT | Mod: S$GLB,,, | Performed by: INTERNAL MEDICINE

## 2019-03-01 PROCEDURE — 85610 POCT INR: ICD-10-PCS | Mod: QW,,, | Performed by: INTERNAL MEDICINE

## 2019-03-01 NOTE — PROGRESS NOTES
Subjective:     Kanu Carrero is a 68 y.o. male with hypertension, hypercholesterolemia and diabetes mellitus type 2. He is mildly obese. He has coronary artery disease with mild left main disease and a history of a RCA intervention in 2008. He has left bundle branch block. He had moderate to severe aortic stenosis and in the fall of 2013 developed mild exertional chest pressure. He underwent cardiac catheterization on 1/30/2014 which revealed an aortic valve area of 1.1 cm2 and no obstructive coronary artery disease was seen with about 40% left main stenosis. There was mild left ventricular dysfunction. He underwent aortic valve replacement receiving a [employer] Mechanical Valve on 3/11/2014. He was then begun on warfarin with anticoagulation managed by Dr. Delgadillo. He was diagnosed with iron deficiency anemia in 10/2016 and underwent EGD and colonoscopy that he says were negative. It was decided to stop aspirin that he was then taking in addition to being anticoagulated. No exertional chest discomfort or exertional dyspnea. No palpitations or weak spells. No bleeding. Feeling well overall.      Coronary Artery Disease   Presents for follow-up visit. The disease course has been stable. Pertinent negatives include no chest pain, chest pressure, chest tightness, dizziness, leg swelling, muscle weakness, palpitations, shortness of breath or weight gain. Risk factors include diabetes, hyperlipidemia, hypertension and obesity. Risk factors do not include decreased physical activity. His past medical history is significant for CHF. There is no history of arrhythmia. The symptoms have been stable.   Congestive Heart Failure   Presents for follow-up visit. The disease course has been stable. Pertinent negatives include no abdominal pain, chest pain, chest pressure, claudication, edema, fatigue, muscle weakness, near-syncope, nocturia, orthopnea, palpitations, paroxysmal nocturnal dyspnea, shortness of breath or  unexpected weight change. The symptoms have been stable. His past medical history is significant for CAD, DM and HTN. There is no history of arrhythmia or chronic lung disease.   Hypertension   This is a chronic problem. The current episode started more than 1 year ago. The problem is unchanged. The problem is controlled (usually 115-125/55-60 mmHg at home). Pertinent negatives include no anxiety, blurred vision, chest pain, headaches, malaise/fatigue, neck pain, orthopnea, palpitations, peripheral edema, PND, shortness of breath or sweats. There is no history of chronic renal disease.   Hyperlipidemia   This is a chronic problem. The current episode started more than 1 year ago. The problem is controlled. Recent lipid tests were reviewed and are variable. Exacerbating diseases include diabetes and obesity. He has no history of chronic renal disease, hypothyroidism, liver disease or nephrotic syndrome. Pertinent negatives include no chest pain, focal sensory loss, focal weakness, leg pain, myalgias or shortness of breath.       Review of Systems   Constitution: Negative for chills, fatigue, fever, malaise/fatigue, unexpected weight change and weight gain.   HENT: Negative for hoarse voice and nosebleeds.    Eyes: Negative for blurred vision, pain, vision loss in left eye and vision loss in right eye.   Cardiovascular: Negative for chest pain, claudication, dyspnea on exertion, irregular heartbeat, leg swelling, near-syncope, orthopnea, palpitations, paroxysmal nocturnal dyspnea and syncope.   Respiratory: Negative for chest tightness, cough, hemoptysis, shortness of breath and wheezing.    Endocrine: Negative for cold intolerance and heat intolerance.   Hematologic/Lymphatic: Negative for bleeding problem. Does not bruise/bleed easily.   Skin: Negative for color change and rash.   Musculoskeletal: Negative for back pain, falls, gout, muscle weakness, myalgias and neck pain.   Gastrointestinal: Negative for abdominal  "pain, heartburn, hematemesis, hematochezia, hemorrhoids, jaundice, melena, nausea and vomiting.   Genitourinary: Negative for dysuria, hematuria and nocturia.   Neurological: Negative for dizziness, focal weakness, headaches, light-headedness, loss of balance and numbness.   Psychiatric/Behavioral: Negative for altered mental status, depression and memory loss. The patient is not nervous/anxious.    Allergic/Immunologic: Negative for hives and persistent infections.       Current Outpatient Medications on File Prior to Visit   Medication Sig Dispense Refill    amLODIPine (NORVASC) 10 MG tablet Take 1 tablet (10 mg total) by mouth once daily. 90 tablet 3    amoxicillin (AMOXIL) 500 MG Tab   0    chlorthalidone (HYGROTEN) 25 MG Tab Take 1 tablet (25 mg total) by mouth once daily. 90 tablet 3    escitalopram oxalate (LEXAPRO) 10 MG tablet Take 10 mg by mouth once daily.  4    ferrous sulfate 325 mg (65 mg iron) Tab tablet Take 325 mg by mouth once daily.  3    metformin (GLUCOPHAGE) 500 MG tablet Take 500 mg by mouth 2 (two) times daily with meals.   4    metoprolol succinate (TOPROL-XL) 50 MG 24 hr tablet Take 1 tablet (50 mg total) by mouth once daily. 90 tablet 3    MULTIVITAMIN W-MINERALS/LUTEIN (CENTRUM SILVER ORAL) Take by mouth.        omeprazole (PRILOSEC) 20 MG capsule Take 20 mg by mouth once daily.   11    oxycodone-acetaminophen 5-325 mg (PERCOCET) 5-325 mg per tablet Take 1 tablet by mouth every 4 (four) hours as needed for Pain. 40 tablet 0    ramipril (ALTACE) 10 MG capsule Take 1 capsule (10 mg total) by mouth once daily. 90 capsule 3    rosuvastatin (CRESTOR) 20 MG tablet Take 1 tablet (20 mg total) by mouth once daily. 90 tablet 3    warfarin (COUMADIN) 3 MG tablet DOSE TO BE ADJUSTED ACCORDING TO INR. 140 tablet 3     No current facility-administered medications on file prior to visit.        /67   Pulse (!) 46   Ht 5' 9" (1.753 m)   Wt 93.9 kg (207 lb)   BMI 30.57 kg/m² "       Objective:     Physical Exam   Constitutional: He is oriented to person, place, and time. He appears well-developed and well-nourished. He does not appear ill. No distress.   HENT:   Head: Normocephalic and atraumatic.   Nose: Nose normal.   Eyes: Right eye exhibits no discharge. Left eye exhibits no discharge. Right conjunctiva is not injected. Left conjunctiva is not injected. Right pupil is round. Left pupil is round. Pupils are equal.   Neck: Neck supple. No JVD present. Carotid bruit is not present. No thyroid mass and no thyromegaly present.   Cardiovascular: Regular rhythm and S1 normal.  No extrasystoles are present. Bradycardia present. PMI is not displaced. Exam reveals no gallop.   Murmur heard.   Harsh midsystolic murmur is present with a grade of 3/6 at the upper right sternal border.  Pulses:       Radial pulses are 2+ on the right side, and 2+ on the left side.        Dorsalis pedis pulses are 2+ on the right side, and 2+ on the left side.        Posterior tibial pulses are 2+ on the right side, and 2+ on the left side.   Mechanical S2.   Pulmonary/Chest: Effort normal and breath sounds normal.   Abdominal: Soft. Normal appearance. There is no hepatosplenomegaly. There is no tenderness.   Musculoskeletal:        Right ankle: He exhibits swelling. He exhibits no ecchymosis and no deformity.        Left ankle: He exhibits swelling. He exhibits no ecchymosis and no deformity.   Lymphadenopathy:        Head (right side): No submandibular adenopathy present.        Head (left side): No submandibular adenopathy present.     He has no cervical adenopathy.   Neurological: He is alert and oriented to person, place, and time. He is not disoriented. No cranial nerve deficit.   Skin: Skin is warm, dry and intact. No rash noted. He is not diaphoretic.   Psychiatric: He has a normal mood and affect. His speech is normal and behavior is normal. Judgment and thought content normal. Cognition and memory are  normal.        Assessment:     1. Coronary artery disease involving native coronary artery of native heart without angina pectoris    2. History of coronary artery stent placement    3. Heart failure, systolic, chronic    4. Left bundle branch block    5. Aortic valve disease    6. History of heart valve replacement    7. Chronic anticoagulation    8. Essential hypertension    9. Hypercholesterolemia    10. Type 2 diabetes mellitus with other circulatory complication, without long-term current use of insulin    11. Overweight    12. Gastroesophageal reflux disease without esophagitis        Plan:     1. Coronary Artery Disease   2008: [facility]: Cath: RCA: Stent.   6/8/2010: [facility] [place]: Cath: LM 40%. RCA: Stent patent. EF 40-45%.              No aspirin as he has iron deficiency anemia.              Stable.    2. Heart Failure, Systolic and Diastolic, Chronic   2005: Diagnosed. EF 40-45%.   5/22/2013: Echo: EF 50-55%.   5/28/2014: Echo: Normal left ventricular size and function. Mild LVH. Mildly dilated LA. Mechanical aortic valve - 2.4 m/s - mild AR.   9/8/2016: Echo: Normal left ventricular size and systolic function. Mild LVH. Moderate diastolic dysfunction. LBBB. Moderately dilated LA. Mechanical AVR fine - 2.7 m/s.              On metoprolol 50 mg Q24 and ramipril 10 mg Q24.   3/1/2019: HR 46 bpm. Will discontinue metoprolol.               Well compensated.     3. Left Bundle Branch Block              2005: Diagnosed.   9/8/2017: SB. LBBB.  ms.   10/19/2018: SB 52. LBBB with  msec.   3/1/2019: HR 46 bpm.              Stable.                4. Aortic Valve Disease              5/22/2013: Echo: Mild AS 3.0 m/s.             Fall 2013: Mild to moderate exertional chest pressure.              1/21/2014: Echo: Normal LV size with low normal LV function. EF 50-55%. LBBB. Moderate AS - 3.2 m/s - 1.2 cm2. Mild to moderate MR.              1/21/2014: Carotid Duplex: Mild plaquing.              1/30/2014: [facility] [place]: Cath:  Severe aortic stenosis - 1.1 cm2. Mean gradient 47 mm Hg. Mild CAD. Mild LV dysfunction with EF 50%.              3/11/2014: [facility] [place]: AVR: [employer] Mechanical Valve - 23 mm.   5/28/2014: Echo: Mechanical aortic valve - 2.4 m/s - mild AR.   On warfarin.   Knows about endocarditis prophylaxis.   Stable.    5. Chronic Anticoagulation   3/2014: Began warfarin for mechanical aortic valve. Goal INR 2-3. Anticoagulation managed by Dr. Delgadillo.    2018: He was on aspirin 81 mg as well but stopped due to iron deficiency anemia.   INR followed.   No aspirin or NSAIDs.    No obvious bleeding.     6. Hypertension              2005: Diagnosed.   On metoprolol 50 mg Q24, amlodipine 10 mg Q24, ramipril 10 mg Q24 and chlorthalidone 12.5 mg Q24.   Leg edema resolved with diuretic.   Keeping log at home.   Well controlled at home.   3/1/2019: HR 46 bpm. Will discontinue metoprolol.       7. Hypercholesterolemia   2007: Began statin.              On atorvastatin 80 mg Q24.   12/16/2016: Chol 134. HDL 35. LDL 74. .   May have had some muscle aches while on atorvastatin.    On rosuvastatin 10 mg Q24.   9/13/2017: Chol 158. HDL 33. LDL 96. .   On rosuvastatin 20 mg Q24.   9/14/2018: Chol 153. HDL 35. LDL 92. .   2/20/2019: Chol 151. HDL 35. LDL 86. .   On rosuvastatin 20 mg Q24.   Quite well controlled.   Needs weight loss.     8. Diabetes Mellitus, Type 2   3/2014: Diagnosed. Complications: CAD. Medications: Oral agent.   On metformin 500 mg Q12.   Tells me well controlled.    9. Overweight   5/5/2017: Weight 97 kg. BMI 32.   9/8/2017: Weight 95 kg. BMI 31.   6/1/2018: Weight 92 kg. BMI 30.   10/19/2018: Weight 89 kg. BMI 29.   3/1/2019: Weight 94 kg. BMI 31.    Encouraged to lose weight.    10. Gastroesophageal Reflux Disease   2008: Diagnosed.   On omeprazole 20 mg Q24.    11. Anemia   10/2016: Diagnosed with iron deficiency anemia.   10/4/2016: EGD & Colonoscopy: Negative.   Receiving iron.    12. Primary  Care              Dr. Uriah Vallecillo.    F/u 4 months.    Sharad Delgadillo M.D.

## 2019-04-05 ENCOUNTER — CLINICAL SUPPORT (OUTPATIENT)
Dept: CARDIOLOGY | Facility: CLINIC | Age: 69
End: 2019-04-05
Payer: OTHER MISCELLANEOUS

## 2019-04-05 DIAGNOSIS — Z79.01 CHRONIC ANTICOAGULATION: Primary | ICD-10-CM

## 2019-04-05 LAB — INR PPP: 2.8 (ref 2–3)

## 2019-04-05 PROCEDURE — 85610 POCT INR: ICD-10-PCS | Mod: QW,,, | Performed by: INTERNAL MEDICINE

## 2019-04-05 PROCEDURE — 85610 PROTHROMBIN TIME: CPT | Mod: QW,,, | Performed by: INTERNAL MEDICINE

## 2019-05-20 ENCOUNTER — CLINICAL SUPPORT (OUTPATIENT)
Dept: CARDIOLOGY | Facility: CLINIC | Age: 69
End: 2019-05-20
Payer: OTHER MISCELLANEOUS

## 2019-05-20 DIAGNOSIS — Z79.01 CHRONIC ANTICOAGULATION: Primary | ICD-10-CM

## 2019-05-20 LAB — INR PPP: 2.4 (ref 2–3)

## 2019-05-20 PROCEDURE — 85610 POCT INR: ICD-10-PCS | Mod: QW,,, | Performed by: INTERNAL MEDICINE

## 2019-05-20 PROCEDURE — 85610 PROTHROMBIN TIME: CPT | Mod: QW,,, | Performed by: INTERNAL MEDICINE

## 2019-06-18 DIAGNOSIS — Z79.01 CHRONIC ANTICOAGULATION: ICD-10-CM

## 2019-06-18 DIAGNOSIS — Z95.2 HISTORY OF HEART VALVE REPLACEMENT: ICD-10-CM

## 2019-06-18 RX ORDER — WARFARIN 3 MG/1
TABLET ORAL
Qty: 140 TABLET | Refills: 3 | Status: SHIPPED | OUTPATIENT
Start: 2019-06-18 | End: 2019-11-01 | Stop reason: SDUPTHER

## 2019-07-05 ENCOUNTER — CLINICAL SUPPORT (OUTPATIENT)
Dept: CARDIOLOGY | Facility: CLINIC | Age: 69
End: 2019-07-05
Payer: OTHER MISCELLANEOUS

## 2019-07-05 ENCOUNTER — OFFICE VISIT (OUTPATIENT)
Dept: CARDIOLOGY | Facility: CLINIC | Age: 69
End: 2019-07-05
Attending: INTERNAL MEDICINE
Payer: OTHER MISCELLANEOUS

## 2019-07-05 VITALS
WEIGHT: 205 LBS | HEIGHT: 69 IN | BODY MASS INDEX: 30.36 KG/M2 | HEART RATE: 68 BPM | SYSTOLIC BLOOD PRESSURE: 141 MMHG | DIASTOLIC BLOOD PRESSURE: 71 MMHG

## 2019-07-05 VITALS
HEART RATE: 68 BPM | DIASTOLIC BLOOD PRESSURE: 71 MMHG | BODY MASS INDEX: 30.36 KG/M2 | WEIGHT: 205 LBS | HEIGHT: 69 IN | SYSTOLIC BLOOD PRESSURE: 141 MMHG

## 2019-07-05 DIAGNOSIS — I10 ESSENTIAL HYPERTENSION: ICD-10-CM

## 2019-07-05 DIAGNOSIS — Z79.01 CHRONIC ANTICOAGULATION: ICD-10-CM

## 2019-07-05 DIAGNOSIS — Z95.5 HISTORY OF CORONARY ARTERY STENT PLACEMENT: ICD-10-CM

## 2019-07-05 DIAGNOSIS — E66.3 OVERWEIGHT: ICD-10-CM

## 2019-07-05 DIAGNOSIS — K21.9 GASTROESOPHAGEAL REFLUX DISEASE WITHOUT ESOPHAGITIS: ICD-10-CM

## 2019-07-05 DIAGNOSIS — E78.00 HYPERCHOLESTEROLEMIA: ICD-10-CM

## 2019-07-05 DIAGNOSIS — I25.10 CORONARY ARTERY DISEASE INVOLVING NATIVE CORONARY ARTERY OF NATIVE HEART WITHOUT ANGINA PECTORIS: ICD-10-CM

## 2019-07-05 DIAGNOSIS — I35.9 AORTIC VALVE DISEASE: ICD-10-CM

## 2019-07-05 DIAGNOSIS — E11.59 TYPE 2 DIABETES MELLITUS WITH OTHER CIRCULATORY COMPLICATION, WITHOUT LONG-TERM CURRENT USE OF INSULIN: ICD-10-CM

## 2019-07-05 DIAGNOSIS — I50.22 HEART FAILURE, SYSTOLIC, CHRONIC: ICD-10-CM

## 2019-07-05 DIAGNOSIS — I44.7 LEFT BUNDLE BRANCH BLOCK: ICD-10-CM

## 2019-07-05 DIAGNOSIS — Z95.2 HISTORY OF HEART VALVE REPLACEMENT: ICD-10-CM

## 2019-07-05 LAB — INR PPP: 2.7 (ref 2–3)

## 2019-07-05 PROCEDURE — 99214 PR OFFICE/OUTPT VISIT, EST, LEVL IV, 30-39 MIN: ICD-10-PCS | Mod: S$GLB,,, | Performed by: INTERNAL MEDICINE

## 2019-07-05 PROCEDURE — 85610 PROTHROMBIN TIME: CPT | Mod: QW,S$GLB,, | Performed by: INTERNAL MEDICINE

## 2019-07-05 PROCEDURE — 99214 OFFICE O/P EST MOD 30 MIN: CPT | Mod: S$GLB,,, | Performed by: INTERNAL MEDICINE

## 2019-07-05 PROCEDURE — 85610 POCT INR: ICD-10-PCS | Mod: QW,S$GLB,, | Performed by: INTERNAL MEDICINE

## 2019-07-05 NOTE — PROGRESS NOTES
Subjective:     Kanu Carrero is a 69 y.o. male with hypertension, hypercholesterolemia and diabetes mellitus type 2. He is mildly obese. He has coronary artery disease with mild left main disease and a history of a RCA intervention in 2008. He has left bundle branch block. He had moderate to severe aortic stenosis and in the fall of 2013 developed mild exertional chest pressure. He underwent cardiac catheterization on 1/30/2014 which revealed an aortic valve area of 1.1 cm2 and no obstructive coronary artery disease was seen with about 40% left main stenosis. There was mild left ventricular dysfunction. He underwent aortic valve replacement receiving a [employer] Mechanical Valve on 3/11/2014. He was then begun on warfarin with anticoagulation managed by Dr. Delgadillo. He was diagnosed with iron deficiency anemia in 10/2016 and underwent EGD and colonoscopy that he says were negative. It was decided to stop aspirin that he was then taking in addition to being anticoagulated. No exertional chest discomfort or exertional dyspnea. No palpitations or weak spells. No bleeding. Feeling well overall.      Coronary Artery Disease   Presents for follow-up visit. The disease course has been stable. Pertinent negatives include no chest pain, chest pressure, chest tightness, dizziness, leg swelling, muscle weakness, palpitations, shortness of breath or weight gain. Risk factors include diabetes, hyperlipidemia, hypertension and obesity. Risk factors do not include decreased physical activity. His past medical history is significant for CHF. There is no history of arrhythmia. The symptoms have been stable.   Congestive Heart Failure   Presents for follow-up visit. The disease course has been stable. Pertinent negatives include no abdominal pain, chest pain, chest pressure, claudication, edema, fatigue, muscle weakness, near-syncope, nocturia, orthopnea, palpitations, paroxysmal nocturnal dyspnea, shortness of breath or  unexpected weight change. The symptoms have been stable. His past medical history is significant for CAD, DM and HTN. There is no history of arrhythmia or chronic lung disease.   Hypertension   This is a chronic problem. The current episode started more than 1 year ago. The problem is unchanged. The problem is controlled (usually 120-130/55-60 mmHg at home). Pertinent negatives include no anxiety, blurred vision, chest pain, headaches, malaise/fatigue, neck pain, orthopnea, palpitations, peripheral edema, PND, shortness of breath or sweats. There is no history of chronic renal disease.   Hyperlipidemia   This is a chronic problem. The current episode started more than 1 year ago. The problem is controlled. Recent lipid tests were reviewed and are variable. Exacerbating diseases include diabetes and obesity. He has no history of chronic renal disease, hypothyroidism, liver disease or nephrotic syndrome. Pertinent negatives include no chest pain, focal sensory loss, focal weakness, leg pain, myalgias or shortness of breath.       Review of Systems   Constitution: Negative for chills, fatigue, fever, malaise/fatigue, unexpected weight change and weight gain.   HENT: Negative for hoarse voice and nosebleeds.    Eyes: Negative for blurred vision, pain, vision loss in left eye and vision loss in right eye.   Cardiovascular: Negative for chest pain, claudication, dyspnea on exertion, irregular heartbeat, leg swelling, near-syncope, orthopnea, palpitations, paroxysmal nocturnal dyspnea and syncope.   Respiratory: Negative for chest tightness, cough, hemoptysis, shortness of breath and wheezing.    Endocrine: Negative for cold intolerance and heat intolerance.   Hematologic/Lymphatic: Negative for bleeding problem. Does not bruise/bleed easily.   Skin: Negative for color change and rash.   Musculoskeletal: Negative for back pain, falls, gout, muscle weakness, myalgias and neck pain.   Gastrointestinal: Negative for abdominal  "pain, heartburn, hematemesis, hematochezia, hemorrhoids, jaundice, melena, nausea and vomiting.   Genitourinary: Negative for dysuria, hematuria and nocturia.   Neurological: Negative for dizziness, focal weakness, headaches, light-headedness, loss of balance and numbness.   Psychiatric/Behavioral: Negative for altered mental status, depression and memory loss. The patient is not nervous/anxious.    Allergic/Immunologic: Negative for hives and persistent infections.       Current Outpatient Medications on File Prior to Visit   Medication Sig Dispense Refill    amLODIPine (NORVASC) 10 MG tablet TAKE 1 TABLET ONCE DAILY 90 tablet 3    amoxicillin (AMOXIL) 500 MG Tab   0    chlorthalidone (HYGROTEN) 25 MG Tab Take 1 tablet (25 mg total) by mouth once daily. 90 tablet 3    escitalopram oxalate (LEXAPRO) 10 MG tablet Take 10 mg by mouth once daily.  4    ferrous sulfate 325 mg (65 mg iron) Tab tablet Take 325 mg by mouth once daily.  3    metformin (GLUCOPHAGE) 500 MG tablet Take 500 mg by mouth 2 (two) times daily with meals.   4    MULTIVITAMIN W-MINERALS/LUTEIN (CENTRUM SILVER ORAL) Take by mouth.        omeprazole (PRILOSEC) 20 MG capsule Take 20 mg by mouth once daily.   11    oxycodone-acetaminophen 5-325 mg (PERCOCET) 5-325 mg per tablet Take 1 tablet by mouth every 4 (four) hours as needed for Pain. 40 tablet 0    ramipril (ALTACE) 10 MG capsule Take 1 capsule (10 mg total) by mouth once daily. 90 capsule 3    rosuvastatin (CRESTOR) 20 MG tablet Take 1 tablet (20 mg total) by mouth once daily. 90 tablet 3    warfarin (JANTOVEN) 3 MG tablet DOSE TO BE ADJUSTED        ACCORDING TO INTERNATIONAL NORMALIZED RATIO 140 tablet 3     No current facility-administered medications on file prior to visit.        BP (!) 141/71   Pulse 68   Ht 5' 9" (1.753 m)   Wt 93 kg (205 lb)   BMI 30.27 kg/m²       Objective:     Physical Exam   Constitutional: He is oriented to person, place, and time. He appears " well-developed and well-nourished. He does not appear ill. No distress.   HENT:   Head: Normocephalic and atraumatic.   Nose: Nose normal.   Eyes: Right eye exhibits no discharge. Left eye exhibits no discharge. Right conjunctiva is not injected. Left conjunctiva is not injected. Right pupil is round. Left pupil is round. Pupils are equal.   Neck: Neck supple. No JVD present. Carotid bruit is not present. No thyroid mass and no thyromegaly present.   Cardiovascular: Regular rhythm and S1 normal.  No extrasystoles are present. Bradycardia present. PMI is not displaced. Exam reveals no gallop.   Murmur heard.   Harsh midsystolic murmur is present with a grade of 3/6 at the upper right sternal border.  Pulses:       Radial pulses are 2+ on the right side, and 2+ on the left side.        Dorsalis pedis pulses are 2+ on the right side, and 2+ on the left side.        Posterior tibial pulses are 2+ on the right side, and 2+ on the left side.   Mechanical S2.   Pulmonary/Chest: Effort normal and breath sounds normal.   Abdominal: Soft. Normal appearance. There is no hepatosplenomegaly. There is no tenderness.   Musculoskeletal:        Right ankle: He exhibits swelling. He exhibits no ecchymosis and no deformity.        Left ankle: He exhibits swelling. He exhibits no ecchymosis and no deformity.   Lymphadenopathy:        Head (right side): No submandibular adenopathy present.        Head (left side): No submandibular adenopathy present.     He has no cervical adenopathy.   Neurological: He is alert and oriented to person, place, and time. He is not disoriented. No cranial nerve deficit.   Skin: Skin is warm, dry and intact. No rash noted. He is not diaphoretic.   Psychiatric: He has a normal mood and affect. His speech is normal and behavior is normal. Judgment and thought content normal. Cognition and memory are normal.        Assessment:     1. Coronary artery disease involving native coronary artery of native heart  without angina pectoris    2. History of coronary artery stent placement    3. Heart failure, systolic, chronic    4. Left bundle branch block    5. Aortic valve disease    6. History of heart valve replacement    7. Chronic anticoagulation    8. Essential hypertension    9. Hypercholesterolemia    10. Type 2 diabetes mellitus with other circulatory complication, without long-term current use of insulin    11. Overweight    12. Gastroesophageal reflux disease without esophagitis        Plan:     1. Coronary Artery Disease   2008: [facility]: Cath: RCA: Stent.   6/8/2010: [facility] [place]: Cath: LM 40%. RCA: Stent patent. EF 40-45%.              No aspirin as he has iron deficiency anemia.              Stable.    2. Heart Failure, Systolic and Diastolic, Chronic   2005: Diagnosed. EF 40-45%.   5/22/2013: Echo: EF 50-55%.   5/28/2014: Echo: Normal left ventricular size and function. Mild LVH. Mildly dilated LA. Mechanical aortic valve - 2.4 m/s - mild AR.   9/8/2016: Echo: Normal left ventricular size and systolic function. Mild LVH. Moderate diastolic dysfunction. LBBB. Moderately dilated LA. Mechanical AVR fine - 2.7 m/s.   3/1/2019: Metoprolol was discontinued due to a HR of 46 bpm.               On ramipril 10 mg Q24.              Well compensated.     3. Left Bundle Branch Block              2005: Diagnosed.   9/8/2017: SB. LBBB.  ms.   10/19/2018: SB 52. LBBB with  msec.   3/1/2019: HR 46 bpm.   7/5/2019: HR 68 bpm.              Stable.                4. Aortic Valve Disease              5/22/2013: Echo: Mild AS 3.0 m/s.             Fall 2013: Mild to moderate exertional chest pressure.              1/21/2014: Echo: Normal LV size with low normal LV function. EF 50-55%. LBBB. Moderate AS - 3.2 m/s - 1.2 cm2. Mild to moderate MR.              1/21/2014: Carotid Duplex: Mild plaquing.              1/30/2014: [facility] [place]: Cath: Severe aortic stenosis - 1.1 cm2. Mean gradient 47 mm Hg. Mild CAD. Mild LV dysfunction with EF  50%.              3/11/2014: [facility] [place]: AVR: [employer] [facility] Mechanical Valve - 23 mm.   5/28/2014: Echo: Mechanical aortic valve - 2.4 m/s - mild AR.   On warfarin.   Knows about endocarditis prophylaxis.   Stable.    5. Chronic Anticoagulation   3/2014: Began warfarin for mechanical aortic valve. Goal INR 2-3. Anticoagulation managed by Dr. Delgadillo.    2018: He was on aspirin 81 mg as well but stopped due to iron deficiency anemia.   INR followed.   No aspirin or NSAIDs.    No obvious bleeding.     6. Hypertension              2005: Diagnosed.   3/1/2019: Metoprolol was discontinued due to a HR of 46 bpm.    On amlodipine 10 mg Q24, ramipril 10 mg Q24 and chlorthalidone 25 mg Q24.   Leg edema resolved with diuretic.   Keeping log at home.   Well controlled at home.       7. Hypercholesterolemia   2007: Began statin.              On atorvastatin 80 mg Q24.   12/16/2016: Chol 134. HDL 35. LDL 74. .   May have had some muscle aches while on atorvastatin.    On rosuvastatin 10 mg Q24.   9/13/2017: Chol 158. HDL 33. LDL 96. .   On rosuvastatin 20 mg Q24.   9/14/2018: Chol 153. HDL 35. LDL 92. .   2/20/2019: Chol 151. HDL 35. LDL 86. .   On rosuvastatin 20 mg Q24.   Quite well controlled.   Needs weight loss.     8. Diabetes Mellitus, Type 2   3/2014: Diagnosed. Complications: CAD. Medications: Oral agent.   On metformin 500 mg Q12.   Tells me well controlled.    9. Overweight   5/5/2017: Weight 97 kg. BMI 32.   9/8/2017: Weight 95 kg. BMI 31.   6/1/2018: Weight 92 kg. BMI 30.   10/19/2018: Weight 89 kg. BMI 29.   3/1/2019: Weight 94 kg. BMI 31.    Encouraged to lose weight.    10. Gastroesophageal Reflux Disease   2008: Diagnosed.   On omeprazole 20 mg Q24.    11. Anemia   10/2016: Diagnosed with iron deficiency anemia.   10/4/2016: EGD & Colonoscopy: Negative.   Receiving iron.    12. Primary Care              Dr. Uriah Vallecillo.    F/u 4 months.    Sharad Delgadillo M.D.

## 2019-07-05 NOTE — PROGRESS NOTES
Subjective:     Kanu Carrero is a 69 y.o. male with hypertension, hypercholesterolemia and diabetes mellitus type 2. He is mildly obese. He has coronary artery disease with mild left main disease and a history of a RCA intervention in 2008. He has left bundle branch block. He had moderate to severe aortic stenosis and in the fall of 2013 developed mild exertional chest pressure. He underwent cardiac catheterization on 1/30/2014 which revealed an aortic valve area of 1.1 cm2 and no obstructive coronary artery disease was seen with about 40% left main stenosis. There was mild left ventricular dysfunction. He underwent aortic valve replacement receiving a [employer] Mechanical Valve on 3/11/2014. He was then begun on warfarin with anticoagulation managed by Dr. Delgadillo. He was diagnosed with iron deficiency anemia in 10/2016 and underwent EGD and colonoscopy that he says were negative. It was decided to stop aspirin that he was then taking in addition to being anticoagulated. No exertional chest discomfort or exertional dyspnea. No palpitations or weak spells. No bleeding. Feeling well overall.      Coronary Artery Disease   Presents for follow-up visit. The disease course has been stable. Pertinent negatives include no chest pain, chest pressure, chest tightness, dizziness, leg swelling, muscle weakness, palpitations, shortness of breath or weight gain. Risk factors include diabetes, hyperlipidemia, hypertension and obesity. Risk factors do not include decreased physical activity. His past medical history is significant for CHF. There is no history of arrhythmia. The symptoms have been stable.   Congestive Heart Failure   Presents for follow-up visit. The disease course has been stable. Pertinent negatives include no abdominal pain, chest pain, chest pressure, claudication, edema, fatigue, muscle weakness, near-syncope, nocturia, orthopnea, palpitations, paroxysmal nocturnal dyspnea, shortness of breath or  unexpected weight change. The symptoms have been stable. His past medical history is significant for CAD, DM and HTN. There is no history of arrhythmia or chronic lung disease.   Hypertension   This is a chronic problem. The current episode started more than 1 year ago. The problem is unchanged. The problem is controlled (usually 115-125/55-60 mmHg at home). Pertinent negatives include no anxiety, blurred vision, chest pain, headaches, malaise/fatigue, neck pain, orthopnea, palpitations, peripheral edema, PND, shortness of breath or sweats. There is no history of chronic renal disease.   Hyperlipidemia   This is a chronic problem. The current episode started more than 1 year ago. The problem is controlled. Recent lipid tests were reviewed and are variable. Exacerbating diseases include diabetes and obesity. He has no history of chronic renal disease, hypothyroidism, liver disease or nephrotic syndrome. Pertinent negatives include no chest pain, focal sensory loss, focal weakness, leg pain, myalgias or shortness of breath.       Review of Systems   Constitution: Negative for chills, fatigue, fever, malaise/fatigue, unexpected weight change and weight gain.   HENT: Negative for hoarse voice and nosebleeds.    Eyes: Negative for blurred vision, pain, vision loss in left eye and vision loss in right eye.   Cardiovascular: Negative for chest pain, claudication, dyspnea on exertion, irregular heartbeat, leg swelling, near-syncope, orthopnea, palpitations, paroxysmal nocturnal dyspnea and syncope.   Respiratory: Negative for chest tightness, cough, hemoptysis, shortness of breath and wheezing.    Endocrine: Negative for cold intolerance and heat intolerance.   Hematologic/Lymphatic: Negative for bleeding problem. Does not bruise/bleed easily.   Skin: Negative for color change and rash.   Musculoskeletal: Negative for back pain, falls, gout, muscle weakness, myalgias and neck pain.   Gastrointestinal: Negative for abdominal  "pain, heartburn, hematemesis, hematochezia, hemorrhoids, jaundice, melena, nausea and vomiting.   Genitourinary: Negative for dysuria, hematuria and nocturia.   Neurological: Negative for dizziness, focal weakness, headaches, light-headedness, loss of balance and numbness.   Psychiatric/Behavioral: Negative for altered mental status, depression and memory loss. The patient is not nervous/anxious.    Allergic/Immunologic: Negative for hives and persistent infections.       Current Outpatient Medications on File Prior to Visit   Medication Sig Dispense Refill    amLODIPine (NORVASC) 10 MG tablet TAKE 1 TABLET ONCE DAILY 90 tablet 3    amoxicillin (AMOXIL) 500 MG Tab   0    chlorthalidone (HYGROTEN) 25 MG Tab Take 1 tablet (25 mg total) by mouth once daily. 90 tablet 3    escitalopram oxalate (LEXAPRO) 10 MG tablet Take 10 mg by mouth once daily.  4    ferrous sulfate 325 mg (65 mg iron) Tab tablet Take 325 mg by mouth once daily.  3    metformin (GLUCOPHAGE) 500 MG tablet Take 500 mg by mouth 2 (two) times daily with meals.   4    MULTIVITAMIN W-MINERALS/LUTEIN (CENTRUM SILVER ORAL) Take by mouth.        omeprazole (PRILOSEC) 20 MG capsule Take 20 mg by mouth once daily.   11    oxycodone-acetaminophen 5-325 mg (PERCOCET) 5-325 mg per tablet Take 1 tablet by mouth every 4 (four) hours as needed for Pain. 40 tablet 0    ramipril (ALTACE) 10 MG capsule Take 1 capsule (10 mg total) by mouth once daily. 90 capsule 3    rosuvastatin (CRESTOR) 20 MG tablet Take 1 tablet (20 mg total) by mouth once daily. 90 tablet 3    warfarin (JANTOVEN) 3 MG tablet DOSE TO BE ADJUSTED        ACCORDING TO INTERNATIONAL NORMALIZED RATIO 140 tablet 3     No current facility-administered medications on file prior to visit.        BP (!) 141/71   Pulse 68   Ht 5' 9" (1.753 m)   Wt 93 kg (205 lb 0.4 oz)   BMI 30.28 kg/m²       Objective:     Physical Exam   Constitutional: He is oriented to person, place, and time. He appears " well-developed and well-nourished. He does not appear ill. No distress.   HENT:   Head: Normocephalic and atraumatic.   Nose: Nose normal.   Eyes: Right eye exhibits no discharge. Left eye exhibits no discharge. Right conjunctiva is not injected. Left conjunctiva is not injected. Right pupil is round. Left pupil is round. Pupils are equal.   Neck: Neck supple. No JVD present. Carotid bruit is not present. No thyroid mass and no thyromegaly present.   Cardiovascular: Regular rhythm and S1 normal.  No extrasystoles are present. Bradycardia present. PMI is not displaced. Exam reveals no gallop.   Murmur heard.   Harsh midsystolic murmur is present with a grade of 3/6 at the upper right sternal border.  Pulses:       Radial pulses are 2+ on the right side, and 2+ on the left side.        Dorsalis pedis pulses are 2+ on the right side, and 2+ on the left side.        Posterior tibial pulses are 2+ on the right side, and 2+ on the left side.   Mechanical S2.   Pulmonary/Chest: Effort normal and breath sounds normal.   Abdominal: Soft. Normal appearance. There is no hepatosplenomegaly. There is no tenderness.   Musculoskeletal:        Right ankle: He exhibits swelling. He exhibits no ecchymosis and no deformity.        Left ankle: He exhibits swelling. He exhibits no ecchymosis and no deformity.   Lymphadenopathy:        Head (right side): No submandibular adenopathy present.        Head (left side): No submandibular adenopathy present.     He has no cervical adenopathy.   Neurological: He is alert and oriented to person, place, and time. He is not disoriented.   Skin: Skin is warm, dry and intact. No rash noted. He is not diaphoretic.   Psychiatric: He has a normal mood and affect. His speech is normal and behavior is normal. Judgment and thought content normal. Cognition and memory are normal.        Assessment:     1. Coronary artery disease involving native coronary artery of native heart without angina pectoris    2.  History of coronary artery stent placement    3. Heart failure, systolic, chronic    4. Left bundle branch block    5. Aortic valve disease    6. History of heart valve replacement    7. Chronic anticoagulation    8. Essential hypertension    9. Hypercholesterolemia    10. Type 2 diabetes mellitus with other circulatory complication, without long-term current use of insulin    11. Overweight    12. Gastroesophageal reflux disease without esophagitis        Plan:     1. Coronary Artery Disease   2008: [facility]: Cath: RCA: Stent.   6/8/2010: [facility]: Cath: LM 40%. RCA: Stent patent. EF 40-45%.              No aspirin as he has iron deficiency anemia.              Stable.    2. Heart Failure, Systolic and Diastolic, Chronic   2005: Diagnosed. EF 40-45%.   5/22/2013: Echo: EF 50-55%.   5/28/2014: Echo: Normal left ventricular size and function. Mild LVH. Mildly dilated LA. Mechanical aortic valve - 2.4 m/s - mild AR.   9/8/2016: Echo: Normal left ventricular size and systolic function. Mild LVH. Moderate diastolic dysfunction. LBBB. Moderately dilated LA. Mechanical AVR fine - 2.7 m/s.   3/1/2019: Metoprolol was discontinued due to HR of 46 bpm.               On ramipril 10 mg Q24.   Well compensated.     3. Left Bundle Branch Block              2005: Diagnosed.   9/8/2017: SB. LBBB.  ms.   10/19/2018: SB 52. LBBB with  msec.   3/1/2019: HR 46 bpm.   7/5/2019: HR 68 bpm.              Stable.                4. Aortic Valve Disease              5/22/2013: Echo: Mild AS 3.0 m/s.             Fall 2013: Mild to moderate exertional chest pressure.              1/21/2014: Echo: Normal LV size with low normal LV function. EF 50-55%. LBBB. Moderate AS - 3.2 m/s - 1.2 cm2. Mild to moderate MR.              1/21/2014: Carotid Duplex: Mild plaquing.              1/30/2014: [facility] [place]: Cath: Severe aortic stenosis - 1.1 cm2. Mean gradient 47 mm Hg. Mild CAD. Mild LV dysfunction with EF 50%.              3/11/2014: [facility]: AVR: [facility].  [facility] Mechanical Valve - 23 mm.   5/28/2014: Echo: Mechanical aortic valve - 2.4 m/s - mild AR.   On warfarin.   Knows about endocarditis prophylaxis.   Stable.    5. Chronic Anticoagulation   3/2014: Began warfarin for mechanical aortic valve. Goal INR 2-3. Anticoagulation managed by Dr. Delgadillo.    2018: He was on aspirin 81 mg as well but stopped due to iron deficiency anemia.   INR followed.   No aspirin or NSAIDs.    No obvious bleeding.     6. Hypertension              2005: Diagnosed.   3/1/2019: Metoprolol was discontinued due to HR of 46 bpm.    On amlodipine 10 mg Q24, ramipril 10 mg Q24 and chlorthalidone 12.5 mg Q24.   Leg edema resolved with diuretic.   Keeping log at home.   Well controlled at home.      7. Hypercholesterolemia   2007: Began statin.              On atorvastatin 80 mg Q24.   12/16/2016: Chol 134. HDL 35. LDL 74. .   May have had some muscle aches while on atorvastatin.    On rosuvastatin 10 mg Q24.   9/13/2017: Chol 158. HDL 33. LDL 96. .   On rosuvastatin 20 mg Q24.   9/14/2018: Chol 153. HDL 35. LDL 92. .   2/20/2019: Chol 151. HDL 35. LDL 86. .   On rosuvastatin 20 mg Q24.   Quite well controlled.   Needs weight loss.     8. Diabetes Mellitus, Type 2   3/2014: Diagnosed. Complications: CAD. Medications: Oral agent.   On metformin 500 mg Q12.   Tells me well controlled.    9. Overweight   5/5/2017: Weight 97 kg. BMI 32.   9/8/2017: Weight 95 kg. BMI 31.   6/1/2018: Weight 92 kg. BMI 30.   10/19/2018: Weight 89 kg. BMI 29.   3/1/2019: Weight 94 kg. BMI 31.    Encouraged to lose weight.    10. Gastroesophageal Reflux Disease   2008: Diagnosed.   On omeprazole 20 mg Q24.    11. Anemia   10/2016: Diagnosed with iron deficiency anemia.   10/4/2016: EGD & Colonoscopy: Negative.   Receiving iron.    12. Primary Care              Dr. Uriah Vallecillo.    F/u 4 months.    Sharad Delgadillo M.D.

## 2019-08-22 ENCOUNTER — CLINICAL SUPPORT (OUTPATIENT)
Dept: CARDIOLOGY | Facility: CLINIC | Age: 69
End: 2019-08-22
Payer: OTHER MISCELLANEOUS

## 2019-08-22 DIAGNOSIS — Z79.01 CHRONIC ANTICOAGULATION: Primary | ICD-10-CM

## 2019-08-22 LAB — INR PPP: 2.1 (ref 2–3)

## 2019-08-22 PROCEDURE — 85610 POCT INR: ICD-10-PCS | Mod: QW,,, | Performed by: INTERNAL MEDICINE

## 2019-08-22 PROCEDURE — 85610 PROTHROMBIN TIME: CPT | Mod: QW,,, | Performed by: INTERNAL MEDICINE

## 2019-08-22 PROCEDURE — 99211 PR OFFICE/OUTPT VISIT, EST, LEVL I: ICD-10-PCS | Mod: 25,S$GLB,, | Performed by: INTERNAL MEDICINE

## 2019-08-22 PROCEDURE — 99211 OFF/OP EST MAY X REQ PHY/QHP: CPT | Mod: 25,S$GLB,, | Performed by: INTERNAL MEDICINE

## 2019-09-30 ENCOUNTER — CLINICAL SUPPORT (OUTPATIENT)
Dept: CARDIOLOGY | Facility: CLINIC | Age: 69
End: 2019-09-30
Payer: OTHER MISCELLANEOUS

## 2019-09-30 DIAGNOSIS — Z79.01 CHRONIC ANTICOAGULATION: Primary | ICD-10-CM

## 2019-09-30 LAB — INR PPP: 3 (ref 2–3)

## 2019-09-30 PROCEDURE — 99211 PR OFFICE/OUTPT VISIT, EST, LEVL I: ICD-10-PCS | Mod: 25,S$GLB,, | Performed by: INTERNAL MEDICINE

## 2019-09-30 PROCEDURE — 85610 PROTHROMBIN TIME: CPT | Mod: QW,S$GLB,, | Performed by: INTERNAL MEDICINE

## 2019-09-30 PROCEDURE — 99211 OFF/OP EST MAY X REQ PHY/QHP: CPT | Mod: 25,S$GLB,, | Performed by: INTERNAL MEDICINE

## 2019-09-30 PROCEDURE — 85610 POCT INR: ICD-10-PCS | Mod: QW,S$GLB,, | Performed by: INTERNAL MEDICINE

## 2019-11-01 ENCOUNTER — OFFICE VISIT (OUTPATIENT)
Dept: CARDIOLOGY | Facility: CLINIC | Age: 69
End: 2019-11-01
Attending: INTERNAL MEDICINE
Payer: OTHER MISCELLANEOUS

## 2019-11-01 VITALS
HEART RATE: 69 BPM | SYSTOLIC BLOOD PRESSURE: 108 MMHG | BODY MASS INDEX: 29.92 KG/M2 | HEIGHT: 69 IN | WEIGHT: 202 LBS | DIASTOLIC BLOOD PRESSURE: 59 MMHG

## 2019-11-01 DIAGNOSIS — E66.3 OVERWEIGHT: ICD-10-CM

## 2019-11-01 DIAGNOSIS — Z95.5 HISTORY OF CORONARY ARTERY STENT PLACEMENT: ICD-10-CM

## 2019-11-01 DIAGNOSIS — Z95.2 HISTORY OF HEART VALVE REPLACEMENT: ICD-10-CM

## 2019-11-01 DIAGNOSIS — I50.22 HEART FAILURE, SYSTOLIC, CHRONIC: ICD-10-CM

## 2019-11-01 DIAGNOSIS — K21.9 GASTROESOPHAGEAL REFLUX DISEASE WITHOUT ESOPHAGITIS: ICD-10-CM

## 2019-11-01 DIAGNOSIS — I25.10 CORONARY ARTERY DISEASE INVOLVING NATIVE CORONARY ARTERY OF NATIVE HEART WITHOUT ANGINA PECTORIS: ICD-10-CM

## 2019-11-01 DIAGNOSIS — I44.7 LEFT BUNDLE BRANCH BLOCK: ICD-10-CM

## 2019-11-01 DIAGNOSIS — Z79.01 CHRONIC ANTICOAGULATION: ICD-10-CM

## 2019-11-01 DIAGNOSIS — I10 ESSENTIAL HYPERTENSION: ICD-10-CM

## 2019-11-01 DIAGNOSIS — E78.00 HYPERCHOLESTEROLEMIA: ICD-10-CM

## 2019-11-01 DIAGNOSIS — E11.59 TYPE 2 DIABETES MELLITUS WITH OTHER CIRCULATORY COMPLICATION, WITHOUT LONG-TERM CURRENT USE OF INSULIN: ICD-10-CM

## 2019-11-01 DIAGNOSIS — I35.9 AORTIC VALVE DISEASE: ICD-10-CM

## 2019-11-01 LAB — INR PPP: 2.8 (ref 2–3)

## 2019-11-01 PROCEDURE — 99214 PR OFFICE/OUTPT VISIT, EST, LEVL IV, 30-39 MIN: ICD-10-PCS | Mod: S$GLB,,, | Performed by: INTERNAL MEDICINE

## 2019-11-01 PROCEDURE — 99214 OFFICE O/P EST MOD 30 MIN: CPT | Mod: S$GLB,,, | Performed by: INTERNAL MEDICINE

## 2019-11-01 PROCEDURE — 85610 PROTHROMBIN TIME: CPT | Mod: QW,S$GLB,, | Performed by: INTERNAL MEDICINE

## 2019-11-01 PROCEDURE — 85610 POCT INR: ICD-10-PCS | Mod: QW,S$GLB,, | Performed by: INTERNAL MEDICINE

## 2019-11-01 RX ORDER — WARFARIN 3 MG/1
TABLET ORAL
Qty: 140 TABLET | Refills: 3 | Status: SHIPPED | OUTPATIENT
Start: 2019-11-01 | End: 2020-09-16 | Stop reason: SDUPTHER

## 2019-11-01 RX ORDER — ROSUVASTATIN CALCIUM 20 MG/1
20 TABLET, COATED ORAL DAILY
Qty: 90 TABLET | Refills: 3 | Status: SHIPPED | OUTPATIENT
Start: 2019-11-01 | End: 2020-09-16 | Stop reason: SDUPTHER

## 2019-11-01 RX ORDER — AMLODIPINE BESYLATE 10 MG/1
10 TABLET ORAL DAILY
Qty: 90 TABLET | Refills: 3 | Status: SHIPPED | OUTPATIENT
Start: 2019-11-01 | End: 2020-09-16 | Stop reason: SDUPTHER

## 2019-11-01 RX ORDER — RAMIPRIL 10 MG/1
10 CAPSULE ORAL DAILY
Qty: 90 CAPSULE | Refills: 3 | Status: SHIPPED | OUTPATIENT
Start: 2019-11-01 | End: 2020-09-16 | Stop reason: SDUPTHER

## 2019-11-01 RX ORDER — CHLORTHALIDONE 25 MG/1
25 TABLET ORAL DAILY
Qty: 90 TABLET | Refills: 3 | Status: SHIPPED | OUTPATIENT
Start: 2019-11-01 | End: 2020-09-16 | Stop reason: SDUPTHER

## 2019-11-01 NOTE — PROGRESS NOTES
Subjective:     Kanu Carrero is a 69 y.o. male with hypertension, hypercholesterolemia and diabetes mellitus type 2. He is overweight. He has coronary artery disease with mild left main disease and a history of a RCA intervention in 2008. He has left bundle branch block. He had moderate to severe aortic stenosis and in the fall of 2013 developed mild exertional chest pressure. He underwent cardiac catheterization on 1/30/2014 which revealed an aortic valve area of 1.1 cm2 and no obstructive coronary artery disease was seen with about 40% left main stenosis. There was mild left ventricular dysfunction. He underwent aortic valve replacement receiving a [employer] Mechanical Valve on 3/11/2014. He was then begun on warfarin with anticoagulation managed by Dr. Delgadillo. He was diagnosed with iron deficiency anemia in 10/2016 and underwent EGD and colonoscopy that he says were negative. It was decided to stop aspirin that he was then taking in addition to being anticoagulated. No exertional chest discomfort or exertional dyspnea. No palpitations or weak spells. No bleeding. Feeling well overall.      Coronary Artery Disease   Presents for follow-up visit. The disease course has been stable. Pertinent negatives include no chest pain, chest pressure, chest tightness, dizziness, leg swelling, muscle weakness, palpitations, shortness of breath or weight gain. Risk factors include diabetes, hyperlipidemia, hypertension and obesity. Risk factors do not include decreased physical activity. His past medical history is significant for CHF. There is no history of arrhythmia. The symptoms have been stable.   Congestive Heart Failure   Presents for follow-up visit. The disease course has been stable. Pertinent negatives include no abdominal pain, chest pain, chest pressure, claudication, edema, fatigue, muscle weakness, near-syncope, nocturia, orthopnea, palpitations, paroxysmal nocturnal dyspnea, shortness of breath or unexpected  weight change. The symptoms have been stable. His past medical history is significant for CAD, DM and HTN. There is no history of arrhythmia or chronic lung disease.   Hypertension   This is a chronic problem. The current episode started more than 1 year ago. The problem is unchanged. The problem is controlled (usually 115-125/55-60 mmHg at home). Pertinent negatives include no anxiety, blurred vision, chest pain, headaches, malaise/fatigue, neck pain, orthopnea, palpitations, peripheral edema, PND, shortness of breath or sweats. There is no history of chronic renal disease.   Hyperlipidemia   This is a chronic problem. The current episode started more than 1 year ago. The problem is controlled. Recent lipid tests were reviewed and are variable. Exacerbating diseases include diabetes and obesity. He has no history of chronic renal disease, hypothyroidism, liver disease or nephrotic syndrome. Pertinent negatives include no chest pain, focal sensory loss, focal weakness, leg pain, myalgias or shortness of breath.       Review of Systems   Constitution: Negative for chills, fatigue, fever, malaise/fatigue, unexpected weight change and weight gain.   HENT: Negative for hoarse voice and nosebleeds.    Eyes: Negative for blurred vision, pain, vision loss in left eye and vision loss in right eye.   Cardiovascular: Negative for chest pain, claudication, dyspnea on exertion, irregular heartbeat, leg swelling, near-syncope, orthopnea, palpitations, paroxysmal nocturnal dyspnea and syncope.   Respiratory: Negative for chest tightness, cough, hemoptysis, shortness of breath and wheezing.    Endocrine: Negative for cold intolerance and heat intolerance.   Hematologic/Lymphatic: Negative for bleeding problem. Does not bruise/bleed easily.   Skin: Negative for color change and rash.   Musculoskeletal: Negative for back pain, falls, gout, muscle weakness, myalgias and neck pain.   Gastrointestinal: Negative for abdominal pain,  "heartburn, hematemesis, hematochezia, hemorrhoids, jaundice, melena, nausea and vomiting.   Genitourinary: Negative for dysuria, hematuria and nocturia.   Neurological: Negative for dizziness, focal weakness, headaches, light-headedness, loss of balance, numbness and vertigo.   Psychiatric/Behavioral: Negative for altered mental status, depression and memory loss. The patient is not nervous/anxious.    Allergic/Immunologic: Negative for hives and persistent infections.       Current Outpatient Medications on File Prior to Visit   Medication Sig Dispense Refill    amLODIPine (NORVASC) 10 MG tablet TAKE 1 TABLET ONCE DAILY 90 tablet 3    amoxicillin (AMOXIL) 500 MG Tab   0    escitalopram oxalate (LEXAPRO) 10 MG tablet Take 10 mg by mouth once daily.  4    ferrous sulfate 325 mg (65 mg iron) Tab tablet Take 325 mg by mouth once daily.  3    metformin (GLUCOPHAGE) 500 MG tablet Take 500 mg by mouth 2 (two) times daily with meals.   4    MULTIVITAMIN W-MINERALS/LUTEIN (CENTRUM SILVER ORAL) Take by mouth.        omeprazole (PRILOSEC) 20 MG capsule Take 20 mg by mouth once daily.   11    oxycodone-acetaminophen 5-325 mg (PERCOCET) 5-325 mg per tablet Take 1 tablet by mouth every 4 (four) hours as needed for Pain. 40 tablet 0    ramipril (ALTACE) 10 MG capsule Take 1 capsule (10 mg total) by mouth once daily. 90 capsule 3    warfarin (JANTOVEN) 3 MG tablet DOSE TO BE ADJUSTED        ACCORDING TO INTERNATIONAL NORMALIZED RATIO 140 tablet 3    chlorthalidone (HYGROTEN) 25 MG Tab Take 1 tablet (25 mg total) by mouth once daily. 90 tablet 3    rosuvastatin (CRESTOR) 20 MG tablet Take 1 tablet (20 mg total) by mouth once daily. 90 tablet 3     No current facility-administered medications on file prior to visit.        BP (!) 108/59   Pulse 69   Ht 5' 9" (1.753 m)   Wt 91.6 kg (202 lb)   BMI 29.83 kg/m²       Objective:     Physical Exam   Constitutional: He is oriented to person, place, and time. He appears " well-developed and well-nourished. He does not appear ill. No distress.   HENT:   Head: Normocephalic and atraumatic.   Nose: Nose normal.   Eyes: Right eye exhibits no discharge. Left eye exhibits no discharge. Right conjunctiva is not injected. Left conjunctiva is not injected. Right pupil is round. Left pupil is round. Pupils are equal.   Neck: Neck supple. No JVD present. Carotid bruit is not present. No thyroid mass and no thyromegaly present.   Cardiovascular: Regular rhythm and S1 normal.  No extrasystoles are present. Bradycardia present. PMI is not displaced. Exam reveals no gallop.   Murmur heard.   Harsh midsystolic murmur is present with a grade of 3/6 at the upper right sternal border.  Pulses:       Radial pulses are 2+ on the right side, and 2+ on the left side.        Dorsalis pedis pulses are 2+ on the right side, and 2+ on the left side.        Posterior tibial pulses are 2+ on the right side, and 2+ on the left side.   Mechanical S2.   Pulmonary/Chest: Effort normal and breath sounds normal.   Abdominal: Soft. Normal appearance. There is no hepatosplenomegaly. There is no tenderness.   Musculoskeletal:        Right ankle: He exhibits swelling. He exhibits no ecchymosis and no deformity.        Left ankle: He exhibits swelling. He exhibits no ecchymosis and no deformity.   Lymphadenopathy:        Head (right side): No submandibular adenopathy present.        Head (left side): No submandibular adenopathy present.     He has no cervical adenopathy.   Neurological: He is alert and oriented to person, place, and time. He is not disoriented.   Skin: Skin is warm, dry and intact. No rash noted. He is not diaphoretic.   Psychiatric: He has a normal mood and affect. His speech is normal and behavior is normal. Judgment and thought content normal. Cognition and memory are normal.        Assessment:     1. Coronary artery disease involving native coronary artery of native heart without angina pectoris    2.  History of coronary artery stent placement    3. Heart failure, systolic, chronic    4. Left bundle branch block    5. Chronic anticoagulation    6. Aortic valve disease    7. History of heart valve replacement    8. Essential hypertension    9. Hypercholesterolemia    10. Type 2 diabetes mellitus with other circulatory complication, without long-term current use of insulin    11. Overweight    12. Gastroesophageal reflux disease without esophagitis        Plan:     1. Coronary Artery Disease   2008: [facility]: Cath: RCA: Stent.   6/8/2010: [facility] [place]: Cath: LM 40%. RCA: Stent patent. EF 40-45%.              No aspirin as he has iron deficiency anemia and anticoagulated.              Stable.    2. Heart Failure, Systolic and Diastolic, Chronic   2005: Diagnosed. EF 40-45%.   5/22/2013: Echo: EF 50-55%.   5/28/2014: Echo: Normal left ventricular size and function. Mild LVH. Mildly dilated LA. Mechanical aortic valve - 2.4 m/s - mild AR.   9/8/2016: Echo: Normal left ventricular size and systolic function. Mild LVH. Moderate diastolic dysfunction. LBBB. Moderately dilated LA. Mechanical AVR fine - 2.7 m/s.   3/1/2019: Metoprolol was discontinued due to HR of 46 bpm.               On ramipril 10 mg Q24.   Well compensated.     3. Left Bundle Branch Block              2005: Diagnosed.   9/8/2017: SB. LBBB.  ms.   10/19/2018: SB 52. LBBB with  msec.   3/1/2019: HR 46 bpm.   7/5/2019: HR 68 bpm.              Stable.                4. Aortic Valve Disease              5/22/2013: Echo: Mild AS 3.0 m/s.             Fall 2013: Mild to moderate exertional chest pressure.              1/21/2014: Echo: Normal LV size with low normal LV function. EF 50-55%. LBBB. Moderate AS - 3.2 m/s - 1.2 cm2. Mild to moderate MR.              1/21/2014: Carotid Duplex: Mild plaquing.              1/30/2014: [facility] [place]: Cath: Severe aortic stenosis - 1.1 cm2. Mean gradient 47 mm Hg. Mild CAD. Mild LV dysfunction with EF 50%.               3/11/2014: [facility]: AVR: [employer] Mechanical Valve - 23 mm.   5/28/2014: Echo: Mechanical aortic valve - 2.4 m/s - mild AR.   On warfarin.   Knows about endocarditis prophylaxis.   Stable.    5. Chronic Anticoagulation   3/2014: Began warfarin for mechanical aortic valve. Goal INR 2-3. Anticoagulation managed by Dr. Delgadillo.    2018: Aspirin 81 mg was discontinued due to iron deficiency anemia.   INR followed.   No aspirin or NSAIDs.    No obvious bleeding.     6. Hypertension              2005: Diagnosed.   3/1/2019: Metoprolol was discontinued due to HR of 46 bpm.    On amlodipine 10 mg Q24, ramipril 10 mg Q24 and chlorthalidone 12.5 mg Q24.   Leg edema resolved with diuretic.   Keeping log at home.   Well controlled at home.      7. Hypercholesterolemia   2007: Began statin.              On atorvastatin 80 mg Q24.   12/16/2016: Chol 134. HDL 35. LDL 74. .   May have had some muscle aches while on atorvastatin.    On rosuvastatin 10 mg Q24.   9/13/2017: Chol 158. HDL 33. LDL 96. .   On rosuvastatin 20 mg Q24.   9/14/2018: Chol 153. HDL 35. LDL 92. .   2/20/2019: Chol 151. HDL 35. LDL 86. .   On rosuvastatin 20 mg Q24.   Quite well controlled.   Needs weight loss.     8. Diabetes Mellitus, Type 2   3/2014: Diagnosed. Complications: CAD. Medications: Oral agent.   On metformin 500 mg Q12.   Tells me well controlled.    9. Overweight   5/5/2017: Weight 97 kg. BMI 32.   9/8/2017: Weight 95 kg. BMI 31.   6/1/2018: Weight 92 kg. BMI 30.   10/19/2018: Weight 89 kg. BMI 29.   3/1/2019: Weight 94 kg. BMI 31.   11/1/2019: Weight 92 kg. BMI 30.    Encouraged to lose weight.    10. Gastroesophageal Reflux Disease   2008: Diagnosed.   On omeprazole 20 mg Q24.    11. Anemia   10/2016: Diagnosed with iron deficiency anemia.   10/4/2016: EGD & Colonoscopy: Negative.   Receiving iron.    12. Primary Care              Dr. Uriah Vallecillo.    F/u 4 months.    Sharad Delgadillo M.D.

## 2020-02-19 ENCOUNTER — CLINICAL SUPPORT (OUTPATIENT)
Dept: CARDIOLOGY | Facility: CLINIC | Age: 70
End: 2020-02-19
Payer: OTHER MISCELLANEOUS

## 2020-02-19 DIAGNOSIS — Z79.01 CHRONIC ANTICOAGULATION: Primary | ICD-10-CM

## 2020-02-19 LAB — INR PPP: 2.6 (ref 2–3)

## 2020-02-19 PROCEDURE — 99211 PR OFFICE/OUTPT VISIT, EST, LEVL I: ICD-10-PCS | Mod: 25,S$GLB,, | Performed by: INTERNAL MEDICINE

## 2020-02-19 PROCEDURE — 85610 PROTHROMBIN TIME: CPT | Mod: QW,S$GLB,, | Performed by: INTERNAL MEDICINE

## 2020-02-19 PROCEDURE — 99211 OFF/OP EST MAY X REQ PHY/QHP: CPT | Mod: 25,S$GLB,, | Performed by: INTERNAL MEDICINE

## 2020-02-19 PROCEDURE — 85610 POCT INR: ICD-10-PCS | Mod: QW,S$GLB,, | Performed by: INTERNAL MEDICINE

## 2020-03-13 ENCOUNTER — OFFICE VISIT (OUTPATIENT)
Dept: CARDIOLOGY | Facility: CLINIC | Age: 70
End: 2020-03-13
Attending: INTERNAL MEDICINE
Payer: OTHER MISCELLANEOUS

## 2020-03-13 VITALS
HEIGHT: 69 IN | SYSTOLIC BLOOD PRESSURE: 127 MMHG | DIASTOLIC BLOOD PRESSURE: 64 MMHG | BODY MASS INDEX: 30.36 KG/M2 | WEIGHT: 205 LBS | HEART RATE: 64 BPM

## 2020-03-13 DIAGNOSIS — Z79.01 CHRONIC ANTICOAGULATION: ICD-10-CM

## 2020-03-13 DIAGNOSIS — Z95.2 HISTORY OF HEART VALVE REPLACEMENT: ICD-10-CM

## 2020-03-13 DIAGNOSIS — I35.9 AORTIC VALVE DISEASE: ICD-10-CM

## 2020-03-13 DIAGNOSIS — I25.10 CORONARY ARTERY DISEASE INVOLVING NATIVE CORONARY ARTERY OF NATIVE HEART WITHOUT ANGINA PECTORIS: ICD-10-CM

## 2020-03-13 DIAGNOSIS — E78.00 HYPERCHOLESTEROLEMIA: ICD-10-CM

## 2020-03-13 DIAGNOSIS — I50.22 HEART FAILURE, SYSTOLIC, CHRONIC: ICD-10-CM

## 2020-03-13 DIAGNOSIS — E11.59 TYPE 2 DIABETES MELLITUS WITH OTHER CIRCULATORY COMPLICATION, WITHOUT LONG-TERM CURRENT USE OF INSULIN: ICD-10-CM

## 2020-03-13 DIAGNOSIS — K21.9 GASTROESOPHAGEAL REFLUX DISEASE WITHOUT ESOPHAGITIS: ICD-10-CM

## 2020-03-13 DIAGNOSIS — I10 ESSENTIAL HYPERTENSION: ICD-10-CM

## 2020-03-13 DIAGNOSIS — I44.7 LEFT BUNDLE BRANCH BLOCK: ICD-10-CM

## 2020-03-13 DIAGNOSIS — Z95.5 HISTORY OF CORONARY ARTERY STENT PLACEMENT: ICD-10-CM

## 2020-03-13 DIAGNOSIS — E66.3 OVERWEIGHT: ICD-10-CM

## 2020-03-13 LAB — INR PPP: 2 (ref 2–3)

## 2020-03-13 PROCEDURE — 85610 PROTHROMBIN TIME: CPT | Mod: QW,S$GLB,, | Performed by: INTERNAL MEDICINE

## 2020-03-13 PROCEDURE — 85610 POCT INR: ICD-10-PCS | Mod: QW,S$GLB,, | Performed by: INTERNAL MEDICINE

## 2020-03-13 PROCEDURE — 99214 PR OFFICE/OUTPT VISIT, EST, LEVL IV, 30-39 MIN: ICD-10-PCS | Mod: S$GLB,,, | Performed by: INTERNAL MEDICINE

## 2020-03-13 PROCEDURE — 99214 OFFICE O/P EST MOD 30 MIN: CPT | Mod: S$GLB,,, | Performed by: INTERNAL MEDICINE

## 2020-03-13 NOTE — PROGRESS NOTES
Subjective:     Kanu Carrero is a 69 y.o. male with hypertension, hypercholesterolemia and diabetes mellitus type 2. He is overweight. He has coronary artery disease with mild left main disease and a history of a RCA intervention in 2008. He has left bundle branch block. He had moderate to severe aortic stenosis and in the fall of 2013 developed mild exertional chest pressure. He underwent cardiac catheterization on 1/30/2014 which revealed an aortic valve area of 1.1 cm2 and no obstructive coronary artery disease was seen with about 40% left main stenosis. There was mild left ventricular dysfunction. He underwent aortic valve replacement receiving a [employer] Mechanical Valve on 3/11/2014. He was then begun on warfarin with anticoagulation managed by Dr. Delgadillo. He was diagnosed with iron deficiency anemia in 10/2016 and underwent EGD and colonoscopy that he says were negative. It was decided to stop aspirin that he was then taking in addition to being anticoagulated. No exertional chest discomfort or exertional dyspnea. No palpitations or weak spells. No bleeding. Feeling well overall.      Coronary Artery Disease   Presents for follow-up visit. The disease course has been stable. Pertinent negatives include no chest pain, chest pressure, chest tightness, dizziness, leg swelling, muscle weakness, palpitations, shortness of breath or weight gain. Risk factors include diabetes, hyperlipidemia, hypertension and obesity. Risk factors do not include decreased physical activity. His past medical history is significant for CHF. There is no history of arrhythmia. The symptoms have been stable.   Congestive Heart Failure   Presents for follow-up visit. The disease course has been stable. Pertinent negatives include no abdominal pain, chest pain, chest pressure, claudication, edema, fatigue, muscle weakness, near-syncope, nocturia, orthopnea, palpitations, paroxysmal nocturnal dyspnea, shortness of breath or unexpected  weight change. The symptoms have been stable. His past medical history is significant for CAD, DM and HTN. There is no history of arrhythmia or chronic lung disease.   Hypertension   This is a chronic problem. The current episode started more than 1 year ago. The problem is unchanged. The problem is controlled (usually 115-125/55-60 mmHg at home). Pertinent negatives include no anxiety, blurred vision, chest pain, headaches, malaise/fatigue, neck pain, orthopnea, palpitations, peripheral edema, PND, shortness of breath or sweats. There is no history of chronic renal disease.   Hyperlipidemia   This is a chronic problem. The current episode started more than 1 year ago. The problem is controlled. Recent lipid tests were reviewed and are variable. Exacerbating diseases include diabetes and obesity. He has no history of chronic renal disease, hypothyroidism, liver disease or nephrotic syndrome. Pertinent negatives include no chest pain, focal sensory loss, focal weakness, leg pain, myalgias or shortness of breath.       Review of Systems   Constitution: Negative for chills, fatigue, fever, malaise/fatigue, unexpected weight change and weight gain.   HENT: Negative for hoarse voice and nosebleeds.    Eyes: Negative for blurred vision, pain, vision loss in left eye and vision loss in right eye.   Cardiovascular: Negative for chest pain, claudication, dyspnea on exertion, irregular heartbeat, leg swelling, near-syncope, orthopnea, palpitations, paroxysmal nocturnal dyspnea and syncope.   Respiratory: Negative for chest tightness, cough, hemoptysis, shortness of breath and wheezing.    Endocrine: Negative for cold intolerance and heat intolerance.   Hematologic/Lymphatic: Negative for bleeding problem. Does not bruise/bleed easily.   Skin: Negative for color change and rash.   Musculoskeletal: Negative for back pain, falls, gout, muscle weakness, myalgias and neck pain.   Gastrointestinal: Negative for abdominal pain,  "heartburn, hematemesis, hematochezia, hemorrhoids, jaundice, melena, nausea and vomiting.   Genitourinary: Negative for dysuria, hematuria and nocturia.   Neurological: Negative for dizziness, focal weakness, headaches, light-headedness, loss of balance, numbness, tremors and vertigo.   Psychiatric/Behavioral: Negative for altered mental status, depression and memory loss. The patient is not nervous/anxious.    Allergic/Immunologic: Negative for hives and persistent infections.       Current Outpatient Medications on File Prior to Visit   Medication Sig Dispense Refill    amLODIPine (NORVASC) 10 MG tablet Take 1 tablet (10 mg total) by mouth once daily. 90 tablet 3    chlorthalidone (HYGROTEN) 25 MG Tab Take 1 tablet (25 mg total) by mouth once daily. 90 tablet 3    escitalopram oxalate (LEXAPRO) 10 MG tablet Take 10 mg by mouth once daily.  4    ferrous sulfate 325 mg (65 mg iron) Tab tablet Take 325 mg by mouth once daily.  3    metformin (GLUCOPHAGE) 500 MG tablet Take 500 mg by mouth 2 (two) times daily with meals.   4    MULTIVITAMIN W-MINERALS/LUTEIN (CENTRUM SILVER ORAL) Take by mouth.        omeprazole (PRILOSEC) 20 MG capsule Take 20 mg by mouth once daily.   11    oxycodone-acetaminophen 5-325 mg (PERCOCET) 5-325 mg per tablet Take 1 tablet by mouth every 4 (four) hours as needed for Pain. 40 tablet 0    ramipril (ALTACE) 10 MG capsule Take 1 capsule (10 mg total) by mouth once daily. 90 capsule 3    rosuvastatin (CRESTOR) 20 MG tablet Take 1 tablet (20 mg total) by mouth once daily. 90 tablet 3    warfarin (JANTOVEN) 3 MG tablet DOSE TO BE ADJUSTED ACCORDING TO  tablet 3     No current facility-administered medications on file prior to visit.        /64   Pulse 64   Ht 5' 9" (1.753 m)   Wt 93 kg (205 lb)   BMI 30.27 kg/m²       Objective:     Physical Exam   Constitutional: He is oriented to person, place, and time. He appears well-developed and well-nourished. He does not " appear ill. No distress.   HENT:   Head: Normocephalic and atraumatic.   Nose: Nose normal.   Eyes: Right eye exhibits no discharge. Left eye exhibits no discharge. Right conjunctiva is not injected. Left conjunctiva is not injected. Right pupil is round. Left pupil is round. Pupils are equal.   Neck: Neck supple. No JVD present. Carotid bruit is not present. No thyroid mass and no thyromegaly present.   Cardiovascular: Regular rhythm and S1 normal.  No extrasystoles are present. Bradycardia present. PMI is not displaced. Exam reveals no gallop.   Murmur heard.   Harsh midsystolic murmur is present with a grade of 3/6 at the upper right sternal border.  Pulses:       Radial pulses are 2+ on the right side, and 2+ on the left side.        Dorsalis pedis pulses are 2+ on the right side, and 2+ on the left side.        Posterior tibial pulses are 2+ on the right side, and 2+ on the left side.   Mechanical S2.   Pulmonary/Chest: Effort normal and breath sounds normal.   Abdominal: Soft. Normal appearance. There is no hepatosplenomegaly. There is no tenderness.   Musculoskeletal:        Right ankle: He exhibits swelling. He exhibits no ecchymosis and no deformity.        Left ankle: He exhibits swelling. He exhibits no ecchymosis and no deformity.   Lymphadenopathy:        Head (right side): No submandibular adenopathy present.        Head (left side): No submandibular adenopathy present.     He has no cervical adenopathy.   Neurological: He is alert and oriented to person, place, and time. He is not disoriented.   Skin: Skin is warm, dry and intact. No rash noted. He is not diaphoretic.   Psychiatric: He has a normal mood and affect. His speech is normal and behavior is normal. Judgment and thought content normal. Cognition and memory are normal.        Assessment:     1. Coronary artery disease involving native coronary artery of native heart without angina pectoris    2. History of coronary artery stent placement     3. Heart failure, systolic, chronic    4. Left bundle branch block    5. Aortic valve disease    6. History of heart valve replacement    7. Chronic anticoagulation    8. Essential hypertension    9. Hypercholesterolemia    10. Type 2 diabetes mellitus with other circulatory complication, without long-term current use of insulin    11. Overweight    12. Gastroesophageal reflux disease without esophagitis        Plan:     1. Coronary Artery Disease   2008: [facility]: Cath: RCA: Stent.   6/8/2010: [facility]: Cath: LM 40%. RCA: Stent patent. EF 40-45%.              No aspirin as he has iron deficiency anemia and anticoagulated.              Stable.    2. Heart Failure, Systolic and Diastolic, Chronic   2005: Diagnosed. EF 40-45%.   5/22/2013: Echo: EF 50-55%.   5/28/2014: Echo: Normal left ventricular size and function. Mild LVH. Mildly dilated LA. Mechanical aortic valve - 2.4 m/s - mild AR.   9/8/2016: Echo: Normal left ventricular size and systolic function. Mild LVH. Moderate diastolic dysfunction. LBBB. Moderately dilated LA. Mechanical AVR fine - 2.7 m/s.   3/1/2019: Metoprolol was discontinued due to HR of 46 bpm.               On ramipril 10 mg Q24.   Well compensated.     3. Left Bundle Branch Block              2005: Diagnosed.   9/8/2017: SB. LBBB.  ms.   10/19/2018: SB 52. LBBB with  msec.   3/1/2019: HR 46 bpm.   7/5/2019: HR 68 bpm.              Stable.                4. Aortic Valve Disease              5/22/2013: Echo: Mild AS 3.0 m/s.   Fall 2013: Mild to moderate exertional chest pressure.   1/21/2014: Echo: Normal LV size with low normal LV function. EF 50-55%. LBBB. Moderate AS - 3.2 m/s - 1.2 cm2. Mild to moderate MR.              1/21/2014: Carotid Duplex: Mild plaquing.              1/30/2014: [facility]: Cath: Severe aortic stenosis - 1.1 cm2. Mean gradient 47 mm Hg. Mild CAD. Mild LV dysfunction with EF 50%.              3/11/2014: [facility]: AVR: [employer] Mechanical Valve - 23 mm.   5/28/2014:  Echo: Mechanical aortic valve - 2.4 m/s - mild AR.   On warfarin.   Knows about endocarditis prophylaxis.   Stable.    5. Chronic Anticoagulation   3/2014: Began warfarin for mechanical aortic valve. Goal INR 2-3. Anticoagulation managed by Dr. Delgadillo.    2018: Aspirin 81 mg was discontinued due to iron deficiency anemia.   INR followed.   No aspirin or NSAIDs.    No obvious bleeding.     6. Hypertension              2005: Diagnosed.   3/1/2019: Metoprolol was discontinued due to HR of 46 bpm.    On amlodipine 10 mg Q24, ramipril 10 mg Q24 and chlorthalidone 12.5 mg Q24.   Leg edema resolved with diuretic.   Keeping log at home.   Well controlled at home.      7. Hypercholesterolemia   2007: Began statin.              On atorvastatin 80 mg Q24.   12/16/2016: Chol 134. HDL 35. LDL 74. .   May have had some muscle aches while on atorvastatin.    On rosuvastatin 10 mg Q24.   9/13/2017: Chol 158. HDL 33. LDL 96. .   On rosuvastatin 20 mg Q24.   9/14/2018: Chol 153. HDL 35. LDL 92. .   2/20/2019: Chol 151. HDL 35. LDL 86. .   On rosuvastatin 20 mg Q24.   Quite well controlled.   Needs weight loss.     8. Diabetes Mellitus, Type 2   3/2014: Diagnosed. Complications: CAD. Medications: Oral agent.   On metformin 500 mg Q12.   Tells me well controlled.    9. Overweight   5/5/2017: Weight 97 kg. BMI 32.   9/8/2017: Weight 95 kg. BMI 31.   6/1/2018: Weight 92 kg. BMI 30.   10/19/2018: Weight 89 kg. BMI 29.   3/1/2019: Weight 94 kg. BMI 31.   11/1/2019: Weight 92 kg. BMI 30.    Encouraged to lose weight.    10. Gastroesophageal Reflux Disease   2008: Diagnosed.   On omeprazole 20 mg Q24.    11. Anemia   10/2016: Diagnosed with iron deficiency anemia.   10/4/2016: EGD & Colonoscopy: Negative.   Receiving iron.    12. Primary Care              Dr. Uriah Vallecillo.    F/u 4 months.    Sharad Delgadillo M.D.

## 2020-05-29 ENCOUNTER — CLINICAL SUPPORT (OUTPATIENT)
Dept: CARDIOLOGY | Facility: CLINIC | Age: 70
End: 2020-05-29
Payer: OTHER MISCELLANEOUS

## 2020-05-29 DIAGNOSIS — Z79.01 CHRONIC ANTICOAGULATION: Primary | ICD-10-CM

## 2020-05-29 LAB — INR PPP: 2.5 (ref 2–3)

## 2020-05-29 PROCEDURE — 85610 PROTHROMBIN TIME: CPT | Mod: QW,,,

## 2020-05-29 PROCEDURE — 99211 PR OFFICE/OUTPT VISIT, EST, LEVL I: ICD-10-PCS | Mod: S$GLB,,, | Performed by: INTERNAL MEDICINE

## 2020-05-29 PROCEDURE — 85610 POCT INR: ICD-10-PCS | Mod: QW,,,

## 2020-05-29 PROCEDURE — 99211 OFF/OP EST MAY X REQ PHY/QHP: CPT | Mod: S$GLB,,, | Performed by: INTERNAL MEDICINE

## 2020-08-06 ENCOUNTER — TELEPHONE (OUTPATIENT)
Dept: CARDIOLOGY | Facility: CLINIC | Age: 70
End: 2020-08-06

## 2020-08-06 NOTE — TELEPHONE ENCOUNTER
Reached out to patient rescheduled his appointment for Dr. Delgadillo.      ----- Message from Danna Terrazas sent at 8/6/2020 10:54 AM CDT -----  Regarding: Return call    Type:  Patient Returning Call    Who Called: DEYANIRA ACUNA [[id number]]    Who Left Message for Patient: unknown     Does the patient know what this is regarding?:    Can the clinic reply in [facility]: No    Best Call Back Number: [phone]    Additional Information: N/A

## 2020-09-16 ENCOUNTER — OFFICE VISIT (OUTPATIENT)
Dept: CARDIOLOGY | Facility: CLINIC | Age: 70
End: 2020-09-16
Attending: INTERNAL MEDICINE
Payer: OTHER MISCELLANEOUS

## 2020-09-16 VITALS
BODY MASS INDEX: 31.51 KG/M2 | DIASTOLIC BLOOD PRESSURE: 64 MMHG | WEIGHT: 212.75 LBS | SYSTOLIC BLOOD PRESSURE: 130 MMHG | HEART RATE: 60 BPM | HEIGHT: 69 IN

## 2020-09-16 DIAGNOSIS — E11.59 TYPE 2 DIABETES MELLITUS WITH OTHER CIRCULATORY COMPLICATION, WITHOUT LONG-TERM CURRENT USE OF INSULIN: ICD-10-CM

## 2020-09-16 DIAGNOSIS — I25.10 CORONARY ARTERY DISEASE INVOLVING NATIVE CORONARY ARTERY OF NATIVE HEART WITHOUT ANGINA PECTORIS: ICD-10-CM

## 2020-09-16 DIAGNOSIS — K21.9 GASTROESOPHAGEAL REFLUX DISEASE WITHOUT ESOPHAGITIS: ICD-10-CM

## 2020-09-16 DIAGNOSIS — Z95.2 HISTORY OF HEART VALVE REPLACEMENT: ICD-10-CM

## 2020-09-16 DIAGNOSIS — I10 ESSENTIAL HYPERTENSION: ICD-10-CM

## 2020-09-16 DIAGNOSIS — I44.7 LEFT BUNDLE BRANCH BLOCK: ICD-10-CM

## 2020-09-16 DIAGNOSIS — E78.00 HYPERCHOLESTEROLEMIA: ICD-10-CM

## 2020-09-16 DIAGNOSIS — I50.22 HEART FAILURE, SYSTOLIC, CHRONIC: ICD-10-CM

## 2020-09-16 DIAGNOSIS — E66.3 OVERWEIGHT: ICD-10-CM

## 2020-09-16 DIAGNOSIS — I35.9 AORTIC VALVE DISEASE: ICD-10-CM

## 2020-09-16 DIAGNOSIS — Z95.5 HISTORY OF CORONARY ARTERY STENT PLACEMENT: ICD-10-CM

## 2020-09-16 DIAGNOSIS — Z79.01 CHRONIC ANTICOAGULATION: ICD-10-CM

## 2020-09-16 PROCEDURE — 99999 PR PBB SHADOW E&M-EST. PATIENT-LVL III: ICD-10-PCS | Mod: PBBFAC,,, | Performed by: INTERNAL MEDICINE

## 2020-09-16 PROCEDURE — 99999 PR PBB SHADOW E&M-EST. PATIENT-LVL III: CPT | Mod: PBBFAC,,, | Performed by: INTERNAL MEDICINE

## 2020-09-16 PROCEDURE — 99214 OFFICE O/P EST MOD 30 MIN: CPT | Mod: S$GLB,,, | Performed by: INTERNAL MEDICINE

## 2020-09-16 PROCEDURE — 99214 PR OFFICE/OUTPT VISIT, EST, LEVL IV, 30-39 MIN: ICD-10-PCS | Mod: S$GLB,,, | Performed by: INTERNAL MEDICINE

## 2020-09-16 RX ORDER — ROSUVASTATIN CALCIUM 20 MG/1
20 TABLET, COATED ORAL DAILY
Qty: 90 TABLET | Refills: 3 | Status: SHIPPED | OUTPATIENT
Start: 2020-09-16 | End: 2021-05-20 | Stop reason: SDUPTHER

## 2020-09-16 RX ORDER — AMLODIPINE BESYLATE 10 MG/1
10 TABLET ORAL DAILY
Qty: 90 TABLET | Refills: 3 | Status: SHIPPED | OUTPATIENT
Start: 2020-09-16 | End: 2021-05-20 | Stop reason: SDUPTHER

## 2020-09-16 RX ORDER — RAMIPRIL 10 MG/1
10 CAPSULE ORAL DAILY
Qty: 90 CAPSULE | Refills: 3 | Status: SHIPPED | OUTPATIENT
Start: 2020-09-16 | End: 2021-05-20 | Stop reason: SDUPTHER

## 2020-09-16 RX ORDER — WARFARIN 3 MG/1
TABLET ORAL
Qty: 140 TABLET | Refills: 3 | Status: SHIPPED | OUTPATIENT
Start: 2020-09-16 | End: 2021-05-20 | Stop reason: SDUPTHER

## 2020-09-16 RX ORDER — CHLORTHALIDONE 25 MG/1
25 TABLET ORAL DAILY
Qty: 90 TABLET | Refills: 3 | Status: SHIPPED | OUTPATIENT
Start: 2020-09-16 | End: 2021-05-20 | Stop reason: SDUPTHER

## 2020-09-16 RX ORDER — EZETIMIBE 10 MG/1
10 TABLET ORAL DAILY
Qty: 90 TABLET | Refills: 3 | Status: SHIPPED | OUTPATIENT
Start: 2020-09-16 | End: 2021-05-20 | Stop reason: SDUPTHER

## 2020-09-16 NOTE — PROGRESS NOTES
Subjective:     Kanu Carrero is a 70 y.o. male with hypertension, hypercholesterolemia and diabetes mellitus type 2. He is overweight. He has coronary artery disease with mild left main disease and a history of a RCA intervention in 2008. He has left bundle branch block. He had moderate to severe aortic stenosis and in the fall of 2013 developed mild exertional chest pressure. He underwent cardiac catheterization on 1/30/2014 which revealed an aortic valve area of 1.1 cm2 and no obstructive coronary artery disease was seen with about 40% left main stenosis. There was mild left ventricular dysfunction. He underwent aortic valve replacement receiving a [employer] Mechanical Valve on 3/11/2014. He was then begun on warfarin with anticoagulation managed by Dr. Delgadillo. He was diagnosed with iron deficiency anemia in 10/2016 and underwent EGD and colonoscopy that he says were negative. It was decided to stop aspirin that he was then taking in addition to being anticoagulated. No exertional chest discomfort or exertional dyspnea. No palpitations or weak spells. No bleeding. Feeling well overall.      Coronary Artery Disease  Presents for follow-up visit. The disease course has been stable. Pertinent negatives include no chest pain, chest pressure, chest tightness, dizziness, leg swelling, muscle weakness, palpitations, shortness of breath or weight gain. Risk factors include diabetes, hyperlipidemia, hypertension and obesity. Risk factors do not include decreased physical activity. His past medical history is significant for CHF. There is no history of arrhythmia. The symptoms have been stable.   Congestive Heart Failure  Presents for follow-up visit. The disease course has been stable. Pertinent negatives include no abdominal pain, chest pain, chest pressure, claudication, edema, fatigue, muscle weakness, near-syncope, nocturia, orthopnea, palpitations, paroxysmal nocturnal dyspnea, shortness of breath or unexpected  weight change. The symptoms have been stable. His past medical history is significant for CAD, DM and HTN. There is no history of arrhythmia or chronic lung disease.   Hypertension  This is a chronic problem. The current episode started more than 1 year ago. The problem is unchanged. The problem is controlled (usually 115-125/55-60 mmHg at home). Pertinent negatives include no anxiety, blurred vision, chest pain, headaches, malaise/fatigue, neck pain, orthopnea, palpitations, peripheral edema, PND, shortness of breath or sweats. There is no history of chronic renal disease.   Hyperlipidemia  This is a chronic problem. The current episode started more than 1 year ago. The problem is controlled. Recent lipid tests were reviewed and are variable. Exacerbating diseases include diabetes and obesity. He has no history of chronic renal disease, hypothyroidism, liver disease or nephrotic syndrome. Pertinent negatives include no chest pain, focal sensory loss, focal weakness, leg pain, myalgias or shortness of breath.       Review of Systems   Constitution: Negative for chills, fatigue, fever, malaise/fatigue, unexpected weight change and weight gain.   HENT: Negative for hoarse voice and nosebleeds.    Eyes: Negative for blurred vision, pain, vision loss in left eye and vision loss in right eye.   Cardiovascular: Negative for chest pain, claudication, dyspnea on exertion, irregular heartbeat, leg swelling, near-syncope, orthopnea, palpitations, paroxysmal nocturnal dyspnea and syncope.   Respiratory: Negative for chest tightness, cough, hemoptysis, shortness of breath and wheezing.    Endocrine: Negative for cold intolerance and heat intolerance.   Hematologic/Lymphatic: Negative for bleeding problem. Does not bruise/bleed easily.   Skin: Negative for color change and rash.   Musculoskeletal: Negative for back pain, falls, gout, muscle weakness, myalgias and neck pain.   Gastrointestinal: Negative for abdominal pain,  "heartburn, hematemesis, hematochezia, hemorrhoids, jaundice, melena, nausea and vomiting.   Genitourinary: Negative for dysuria, hematuria and nocturia.   Neurological: Negative for dizziness, focal weakness, headaches, light-headedness, loss of balance, numbness, tremors and vertigo.   Psychiatric/Behavioral: Negative for altered mental status, depression and memory loss. The patient is not nervous/anxious.    Allergic/Immunologic: Negative for hives and persistent infections.       Current Outpatient Medications on File Prior to Visit   Medication Sig Dispense Refill    amLODIPine (NORVASC) 10 MG tablet Take 1 tablet (10 mg total) by mouth once daily. 90 tablet 3    chlorthalidone (HYGROTEN) 25 MG Tab Take 1 tablet (25 mg total) by mouth once daily. 90 tablet 3    escitalopram oxalate (LEXAPRO) 10 MG tablet Take 10 mg by mouth once daily.  4    ferrous sulfate 325 mg (65 mg iron) Tab tablet Take 325 mg by mouth once daily.  3    metformin (GLUCOPHAGE) 500 MG tablet Take 500 mg by mouth 2 (two) times daily with meals.   4    MULTIVITAMIN W-MINERALS/LUTEIN (CENTRUM SILVER ORAL) Take by mouth.        omeprazole (PRILOSEC) 20 MG capsule Take 20 mg by mouth once daily.   11    oxycodone-acetaminophen 5-325 mg (PERCOCET) 5-325 mg per tablet Take 1 tablet by mouth every 4 (four) hours as needed for Pain. 40 tablet 0    ramipril (ALTACE) 10 MG capsule Take 1 capsule (10 mg total) by mouth once daily. 90 capsule 3    rosuvastatin (CRESTOR) 20 MG tablet Take 1 tablet (20 mg total) by mouth once daily. 90 tablet 3    warfarin (JANTOVEN) 3 MG tablet DOSE TO BE ADJUSTED ACCORDING TO  tablet 3     No current facility-administered medications on file prior to visit.        /64 (BP Location: Right arm, Patient Position: Sitting, BP Method: Large (Automatic))   Pulse 60   Ht 5' 9" (1.753 m)   Wt 96.5 kg (212 lb 11.9 oz)   BMI 31.42 kg/m²       Objective:     Physical Exam   Constitutional: He is " oriented to person, place, and time. He appears well-developed and well-nourished. He does not appear ill. No distress.   HENT:   Head: Normocephalic and atraumatic.   Nose: Nose normal.   Eyes: Right eye exhibits no discharge. Left eye exhibits no discharge. Right conjunctiva is not injected. Left conjunctiva is not injected. Right pupil is round. Left pupil is round. Pupils are equal.   Neck: Neck supple. No JVD present. Carotid bruit is not present. No thyroid mass and no thyromegaly present.   Cardiovascular: Regular rhythm and S1 normal.  No extrasystoles are present. Bradycardia present. PMI is not displaced. Exam reveals no gallop.   Murmur heard.   Harsh midsystolic murmur is present with a grade of 3/6 at the upper right sternal border.  Pulses:       Radial pulses are 2+ on the right side and 2+ on the left side.        Dorsalis pedis pulses are 2+ on the right side and 2+ on the left side.        Posterior tibial pulses are 2+ on the right side and 2+ on the left side.   Mechanical S2.   Pulmonary/Chest: Effort normal and breath sounds normal.   Abdominal: Soft. Normal appearance. There is no hepatosplenomegaly. There is no abdominal tenderness.   Musculoskeletal:      Right ankle: He exhibits swelling. He exhibits no ecchymosis and no deformity.      Left ankle: He exhibits swelling. He exhibits no ecchymosis and no deformity.   Lymphadenopathy:        Head (right side): No submandibular adenopathy present.        Head (left side): No submandibular adenopathy present.     He has no cervical adenopathy.   Neurological: He is alert and oriented to person, place, and time. He is not disoriented.   Skin: Skin is warm, dry and intact. He is not diaphoretic.   Psychiatric: He has a normal mood and affect. His speech is normal and behavior is normal. Judgment and thought content normal. Cognition and memory are normal.        Assessment:     1. Coronary artery disease involving native coronary artery of native  heart without angina pectoris    2. History of coronary artery stent placement    3. Heart failure, systolic, chronic    4. Left bundle branch block    5. Aortic valve disease    6. History of heart valve replacement    7. Chronic anticoagulation    8. Essential hypertension    9. Hypercholesterolemia    10. Type 2 diabetes mellitus with other circulatory complication, without long-term current use of insulin    11. Overweight    12. Gastroesophageal reflux disease without esophagitis        Plan:     1. Coronary Artery Disease   2008: [facility]: Cath: RCA: Stent.   6/8/2010: [facility] [place]: Cath: LM 40%. RCA: Stent patent. EF 40-45%.              No aspirin as he has iron deficiency anemia and anticoagulated.              Stable.    2. Heart Failure, Systolic and Diastolic, Chronic   2005: Diagnosed. EF 40-45%.   5/22/2013: Echo: EF 50-55%.   5/28/2014: Echo: Normal left ventricular size and function. Mild LVH. Mildly dilated LA. Mechanical aortic valve - 2.4 m/s - mild AR.   9/8/2016: Echo: Normal left ventricular size and systolic function. Mild LVH. Moderate diastolic dysfunction. LBBB. Moderately dilated LA. Mechanical AVR fine - 2.7 m/s.   3/1/2019: Metoprolol was discontinued due to HR of 46 bpm.               On ramipril 10 mg Q24.   Well compensated.     3. Left Bundle Branch Block              2005: Diagnosed.   9/8/2017: SB. LBBB.  ms.   10/19/2018: SB 52. LBBB with  msec.   3/1/2019: HR 46 bpm.   7/5/2019: HR 68 bpm.              Stable.                4. Aortic Valve Disease              5/22/2013: Echo: Mild AS 3.0 m/s.   Fall 2013: Mild to moderate exertional chest pressure.   1/21/2014: Echo: Normal LV size with low normal LV function. EF 50-55%. LBBB. Moderate AS - 3.2 m/s - 1.2 cm2. Mild to moderate MR.              1/21/2014: Carotid Duplex: Mild plaquing.              1/30/2014: [facility] [place]: Cath: Severe aortic stenosis - 1.1 cm2. Mean gradient 47 mm Hg. Mild CAD. Mild LV dysfunction with EF 50%.               3/11/2014: [facility] [place]: AVR: [employer] Chris Mechanical Valve - 23 mm.   5/28/2014: Echo: Mechanical aortic valve - 2.4 m/s - mild AR.   On warfarin.   Knows about endocarditis prophylaxis.   Stable.    5. Chronic Anticoagulation   3/2014: Began warfarin for mechanical aortic valve. Goal INR 2-3. Anticoagulation managed by Dr. Delgadillo.    2018: Aspirin 81 mg was discontinued due to iron deficiency anemia.   INR followed.   No aspirin or NSAIDs.    No obvious bleeding.    6. Hypertension              2005: Diagnosed.   3/1/2019: Metoprolol was discontinued due to HR of 46 bpm.    On amlodipine 10 mg Q24, ramipril 10 mg Q24 and chlorthalidone 25 mg Q24.   Leg edema resolved with diuretic.   Keeping log at home.   Well controlled at home.      7. Hypercholesterolemia   2007: Began statin.              On atorvastatin 80 mg Q24.   12/16/2016: Chol 134. HDL 35. LDL 74. .   May have had some muscle aches while on atorvastatin.    On rosuvastatin 10 mg Q24.   9/13/2017: Chol 158. HDL 33. LDL 96. .   On rosuvastatin 20 mg Q24.   9/14/2018: Chol 153. HDL 35. LDL 92. .   2/20/2019: Chol 151. HDL 35. LDL 86. .   7/9/2020: Chol 169. HDL 40. LDL 95. .   On rosuvastatin 20 mg Q24.   9/16/2020: Add ezetimibe 10 mg to be begun.   Needs weight loss.   11/2020: Do lipid panel.     8. Diabetes Mellitus, Type 2   3/2014: Diagnosed. Complications: CAD. Medications: Oral agent.   On metformin 500 mg Q12.   Tells me well controlled.    9. Mild Obesity   5/5/2017: Weight 97 kg. BMI 32.   9/8/2017: Weight 95 kg. BMI 31.   6/1/2018: Weight 92 kg. BMI 30.   10/19/2018: Weight 89 kg. BMI 29.   3/1/2019: Weight 94 kg. BMI 31.   11/1/2019: Weight 92 kg. BMI 30.   9/16/2020: Weight 96 kg. BMI 31.    Encouraged to lose weight.    10. Gastroesophageal Reflux Disease   2008: Diagnosed.   On omeprazole 20 mg Q24.    11. Anemia   10/2016: Diagnosed with iron deficiency anemia.   10/4/2016: EGD & Colonoscopy:  Negative.   Receiving iron.    12. Primary Care              Dr. Uriah Vallecillo.    F/u 4 months.    Sharad Delgadillo M.D.

## 2020-09-17 ENCOUNTER — LAB VISIT (OUTPATIENT)
Dept: LAB | Facility: HOSPITAL | Age: 70
End: 2020-09-17
Attending: INTERNAL MEDICINE
Payer: COMMERCIAL

## 2020-09-17 DIAGNOSIS — Z79.01 CHRONIC ANTICOAGULATION: ICD-10-CM

## 2020-09-17 DIAGNOSIS — Z95.2 HISTORY OF HEART VALVE REPLACEMENT: ICD-10-CM

## 2020-09-17 LAB
INR PPP: 2.5 (ref 0.8–1.2)
PROTHROMBIN TIME: 25.1 SEC (ref 9–12.5)

## 2020-09-17 PROCEDURE — 36415 COLL VENOUS BLD VENIPUNCTURE: CPT

## 2020-09-17 PROCEDURE — 85610 PROTHROMBIN TIME: CPT

## 2020-09-23 ENCOUNTER — TELEPHONE (OUTPATIENT)
Dept: CARDIOLOGY | Facility: CLINIC | Age: 70
End: 2020-09-23

## 2020-09-23 NOTE — TELEPHONE ENCOUNTER
Discussed with patient.    ----- Message from Janiya Sorensen sent at 9/22/2020  4:46 PM CDT -----    ----- Message -----  From: Brenda Sorensen  Sent: 9/22/2020   1:04 PM CDT  To: [facility]ie [facility] Staff    Name of Who is Calling: DEYANIRA ACUNA [[id number]]    What is the request in detail: DEYANIRA ACUNA [[id number]] is calling in regards to equipment Please contact to further discuss and advise      Can the clinic reply by [facility]: no     What Number to Call Back if not in [facility]:  [phone]

## 2020-09-23 NOTE — TELEPHONE ENCOUNTER
Discussed with patient.    ----- Message from Janiya Sorensen sent at 9/22/2020  4:46 PM CDT -----    ----- Message -----  From: Marcella Harrington  Sent: 9/22/2020  12:21 PM CDT  To: Elie Orourke Staff    Type:  Patient Returning Call    Who Called: pt     Who Left Message for Patient: pt states he missed a call from [facility].     Does the patient know what this is regarding?:     Would the patient rather a call back or a response via [facility]? Call back     Best Call Back Number: [phone]    Additional Information:

## 2020-11-18 ENCOUNTER — LAB VISIT (OUTPATIENT)
Dept: LAB | Facility: HOSPITAL | Age: 70
End: 2020-11-18
Attending: INTERNAL MEDICINE
Payer: MEDICARE

## 2020-11-18 DIAGNOSIS — Z95.2 HISTORY OF HEART VALVE REPLACEMENT: ICD-10-CM

## 2020-11-18 DIAGNOSIS — Z79.01 CHRONIC ANTICOAGULATION: ICD-10-CM

## 2020-11-18 DIAGNOSIS — E78.00 HYPERCHOLESTEROLEMIA: ICD-10-CM

## 2020-11-18 LAB
ALBUMIN SERPL BCP-MCNC: 4.3 G/DL (ref 3.5–5.2)
ALP SERPL-CCNC: 60 U/L (ref 55–135)
ALT SERPL W/O P-5'-P-CCNC: 25 U/L (ref 10–44)
AST SERPL-CCNC: 19 U/L (ref 10–40)
BILIRUB DIRECT SERPL-MCNC: 0.3 MG/DL (ref 0.1–0.3)
BILIRUB SERPL-MCNC: 0.6 MG/DL (ref 0.1–1)
CHOLEST SERPL-MCNC: 109 MG/DL (ref 120–199)
CHOLEST/HDLC SERPL: 3.2 {RATIO} (ref 2–5)
HDLC SERPL-MCNC: 34 MG/DL (ref 40–75)
HDLC SERPL: 31.2 % (ref 20–50)
INR PPP: 2.7 (ref 0.8–1.2)
LDLC SERPL CALC-MCNC: 50 MG/DL (ref 63–159)
NONHDLC SERPL-MCNC: 75 MG/DL
PROT SERPL-MCNC: 7.5 G/DL (ref 6–8.4)
PROTHROMBIN TIME: 27.3 SEC (ref 9–12.5)
TRIGL SERPL-MCNC: 125 MG/DL (ref 30–150)

## 2020-11-18 PROCEDURE — 36415 COLL VENOUS BLD VENIPUNCTURE: CPT

## 2020-11-18 PROCEDURE — 80076 HEPATIC FUNCTION PANEL: CPT

## 2020-11-18 PROCEDURE — 80061 LIPID PANEL: CPT

## 2020-11-18 PROCEDURE — 85610 PROTHROMBIN TIME: CPT

## 2021-01-19 ENCOUNTER — LAB VISIT (OUTPATIENT)
Dept: LAB | Facility: HOSPITAL | Age: 71
End: 2021-01-19
Attending: INTERNAL MEDICINE
Payer: OTHER MISCELLANEOUS

## 2021-01-19 DIAGNOSIS — Z95.2 HISTORY OF HEART VALVE REPLACEMENT: ICD-10-CM

## 2021-01-19 DIAGNOSIS — Z79.01 CHRONIC ANTICOAGULATION: ICD-10-CM

## 2021-01-19 LAB
INR PPP: 2.8 (ref 0.8–1.2)
PROTHROMBIN TIME: 27.8 SEC (ref 9–12.5)

## 2021-01-19 PROCEDURE — 36415 COLL VENOUS BLD VENIPUNCTURE: CPT

## 2021-01-19 PROCEDURE — 85610 PROTHROMBIN TIME: CPT

## 2021-01-21 ENCOUNTER — OFFICE VISIT (OUTPATIENT)
Dept: CARDIOLOGY | Facility: CLINIC | Age: 71
End: 2021-01-21
Attending: INTERNAL MEDICINE
Payer: OTHER MISCELLANEOUS

## 2021-01-21 VITALS
SYSTOLIC BLOOD PRESSURE: 128 MMHG | HEIGHT: 69 IN | DIASTOLIC BLOOD PRESSURE: 71 MMHG | HEART RATE: 63 BPM | BODY MASS INDEX: 31.02 KG/M2 | WEIGHT: 209.44 LBS

## 2021-01-21 DIAGNOSIS — E66.3 OVERWEIGHT: ICD-10-CM

## 2021-01-21 DIAGNOSIS — I44.7 LEFT BUNDLE BRANCH BLOCK: ICD-10-CM

## 2021-01-21 DIAGNOSIS — Z79.01 CHRONIC ANTICOAGULATION: ICD-10-CM

## 2021-01-21 DIAGNOSIS — I10 ESSENTIAL HYPERTENSION: ICD-10-CM

## 2021-01-21 DIAGNOSIS — I35.9 AORTIC VALVE DISEASE: ICD-10-CM

## 2021-01-21 DIAGNOSIS — K21.9 GASTROESOPHAGEAL REFLUX DISEASE WITHOUT ESOPHAGITIS: ICD-10-CM

## 2021-01-21 DIAGNOSIS — E11.59 TYPE 2 DIABETES MELLITUS WITH OTHER CIRCULATORY COMPLICATION, WITHOUT LONG-TERM CURRENT USE OF INSULIN: ICD-10-CM

## 2021-01-21 DIAGNOSIS — E78.00 HYPERCHOLESTEROLEMIA: ICD-10-CM

## 2021-01-21 DIAGNOSIS — I25.10 CORONARY ARTERY DISEASE INVOLVING NATIVE CORONARY ARTERY OF NATIVE HEART WITHOUT ANGINA PECTORIS: ICD-10-CM

## 2021-01-21 DIAGNOSIS — Z95.5 HISTORY OF CORONARY ARTERY STENT PLACEMENT: ICD-10-CM

## 2021-01-21 DIAGNOSIS — Z95.2 HISTORY OF HEART VALVE REPLACEMENT: ICD-10-CM

## 2021-01-21 DIAGNOSIS — I50.22 HEART FAILURE, SYSTOLIC, CHRONIC: ICD-10-CM

## 2021-01-21 PROCEDURE — 99213 OFFICE O/P EST LOW 20 MIN: CPT | Mod: PBBFAC | Performed by: INTERNAL MEDICINE

## 2021-01-21 PROCEDURE — 99999 PR PBB SHADOW E&M-EST. PATIENT-LVL III: CPT | Mod: PBBFAC,,, | Performed by: INTERNAL MEDICINE

## 2021-01-21 PROCEDURE — 99214 OFFICE O/P EST MOD 30 MIN: CPT | Mod: S$PBB,,, | Performed by: INTERNAL MEDICINE

## 2021-01-21 PROCEDURE — 99214 PR OFFICE/OUTPT VISIT, EST, LEVL IV, 30-39 MIN: ICD-10-PCS | Mod: S$PBB,,, | Performed by: INTERNAL MEDICINE

## 2021-01-21 PROCEDURE — 99999 PR PBB SHADOW E&M-EST. PATIENT-LVL III: ICD-10-PCS | Mod: PBBFAC,,, | Performed by: INTERNAL MEDICINE

## 2021-02-18 ENCOUNTER — LAB VISIT (OUTPATIENT)
Dept: LAB | Facility: HOSPITAL | Age: 71
End: 2021-02-18
Attending: INTERNAL MEDICINE
Payer: OTHER MISCELLANEOUS

## 2021-02-18 DIAGNOSIS — Z95.2 HISTORY OF HEART VALVE REPLACEMENT: ICD-10-CM

## 2021-02-18 DIAGNOSIS — Z79.01 CHRONIC ANTICOAGULATION: ICD-10-CM

## 2021-02-18 LAB
INR PPP: 2.5 (ref 0.8–1.2)
PROTHROMBIN TIME: 25.4 SEC (ref 9–12.5)

## 2021-02-18 PROCEDURE — 85610 PROTHROMBIN TIME: CPT

## 2021-02-18 PROCEDURE — 36415 COLL VENOUS BLD VENIPUNCTURE: CPT

## 2021-02-27 ENCOUNTER — IMMUNIZATION (OUTPATIENT)
Dept: INTERNAL MEDICINE | Facility: CLINIC | Age: 71
End: 2021-02-27
Payer: MEDICARE

## 2021-02-27 DIAGNOSIS — Z23 NEED FOR VACCINATION: Primary | ICD-10-CM

## 2021-02-27 PROCEDURE — 91300 COVID-19, MRNA, LNP-S, PF, 30 MCG/0.3 ML DOSE VACCINE: CPT | Mod: PBBFAC | Performed by: FAMILY MEDICINE

## 2021-03-20 ENCOUNTER — IMMUNIZATION (OUTPATIENT)
Dept: INTERNAL MEDICINE | Facility: CLINIC | Age: 71
End: 2021-03-20
Payer: MEDICARE

## 2021-03-20 DIAGNOSIS — Z23 NEED FOR VACCINATION: Primary | ICD-10-CM

## 2021-03-20 PROCEDURE — 0002A COVID-19, MRNA, LNP-S, PF, 30 MCG/0.3 ML DOSE VACCINE: CPT | Mod: PBBFAC | Performed by: FAMILY MEDICINE

## 2021-03-20 PROCEDURE — 91300 COVID-19, MRNA, LNP-S, PF, 30 MCG/0.3 ML DOSE VACCINE: CPT | Mod: PBBFAC | Performed by: FAMILY MEDICINE

## 2021-03-22 ENCOUNTER — LAB VISIT (OUTPATIENT)
Dept: LAB | Facility: HOSPITAL | Age: 71
End: 2021-03-22
Attending: INTERNAL MEDICINE
Payer: MEDICARE

## 2021-03-22 DIAGNOSIS — Z95.2 HISTORY OF HEART VALVE REPLACEMENT: ICD-10-CM

## 2021-03-22 DIAGNOSIS — Z79.01 CHRONIC ANTICOAGULATION: ICD-10-CM

## 2021-03-22 LAB
INR PPP: 2.1 (ref 0.8–1.2)
PROTHROMBIN TIME: 21.6 SEC (ref 9–12.5)

## 2021-03-22 PROCEDURE — 85610 PROTHROMBIN TIME: CPT | Performed by: INTERNAL MEDICINE

## 2021-03-22 PROCEDURE — 36415 COLL VENOUS BLD VENIPUNCTURE: CPT | Performed by: INTERNAL MEDICINE

## 2021-04-15 ENCOUNTER — LAB VISIT (OUTPATIENT)
Dept: LAB | Facility: HOSPITAL | Age: 71
End: 2021-04-15
Attending: INTERNAL MEDICINE
Payer: MEDICARE

## 2021-04-15 DIAGNOSIS — Z95.2 HISTORY OF HEART VALVE REPLACEMENT: ICD-10-CM

## 2021-04-15 DIAGNOSIS — Z79.01 CHRONIC ANTICOAGULATION: ICD-10-CM

## 2021-04-15 LAB
INR PPP: 3.3 (ref 0.8–1.2)
PROTHROMBIN TIME: 33.3 SEC (ref 9–12.5)

## 2021-04-15 PROCEDURE — 85610 PROTHROMBIN TIME: CPT | Performed by: INTERNAL MEDICINE

## 2021-04-15 PROCEDURE — 36415 COLL VENOUS BLD VENIPUNCTURE: CPT | Performed by: INTERNAL MEDICINE

## 2021-04-29 ENCOUNTER — LAB VISIT (OUTPATIENT)
Dept: LAB | Facility: HOSPITAL | Age: 71
End: 2021-04-29
Attending: INTERNAL MEDICINE
Payer: MEDICARE

## 2021-04-29 DIAGNOSIS — Z95.2 HISTORY OF HEART VALVE REPLACEMENT: ICD-10-CM

## 2021-04-29 DIAGNOSIS — Z79.01 CHRONIC ANTICOAGULATION: ICD-10-CM

## 2021-04-29 LAB
INR PPP: 3 (ref 0.8–1.2)
PROTHROMBIN TIME: 30.1 SEC (ref 9–12.5)

## 2021-04-29 PROCEDURE — 85610 PROTHROMBIN TIME: CPT | Performed by: INTERNAL MEDICINE

## 2021-04-29 PROCEDURE — 36415 COLL VENOUS BLD VENIPUNCTURE: CPT | Performed by: INTERNAL MEDICINE

## 2021-05-18 ENCOUNTER — PATIENT OUTREACH (OUTPATIENT)
Dept: ADMINISTRATIVE | Facility: HOSPITAL | Age: 71
End: 2021-05-18

## 2021-05-18 ENCOUNTER — PATIENT MESSAGE (OUTPATIENT)
Dept: ADMINISTRATIVE | Facility: HOSPITAL | Age: 71
End: 2021-05-18

## 2021-05-20 ENCOUNTER — OFFICE VISIT (OUTPATIENT)
Dept: CARDIOLOGY | Facility: CLINIC | Age: 71
End: 2021-05-20
Attending: INTERNAL MEDICINE
Payer: OTHER MISCELLANEOUS

## 2021-05-20 ENCOUNTER — LAB VISIT (OUTPATIENT)
Dept: LAB | Facility: HOSPITAL | Age: 71
End: 2021-05-20
Attending: INTERNAL MEDICINE
Payer: MEDICARE

## 2021-05-20 VITALS
BODY MASS INDEX: 31.68 KG/M2 | HEART RATE: 64 BPM | WEIGHT: 213.88 LBS | DIASTOLIC BLOOD PRESSURE: 65 MMHG | SYSTOLIC BLOOD PRESSURE: 125 MMHG | HEIGHT: 69 IN

## 2021-05-20 DIAGNOSIS — I50.22 HEART FAILURE, SYSTOLIC, CHRONIC: ICD-10-CM

## 2021-05-20 DIAGNOSIS — I25.10 CORONARY ARTERY DISEASE INVOLVING NATIVE CORONARY ARTERY OF NATIVE HEART WITHOUT ANGINA PECTORIS: ICD-10-CM

## 2021-05-20 DIAGNOSIS — Z95.2 HISTORY OF HEART VALVE REPLACEMENT: ICD-10-CM

## 2021-05-20 DIAGNOSIS — E11.59 TYPE 2 DIABETES MELLITUS WITH OTHER CIRCULATORY COMPLICATION, WITHOUT LONG-TERM CURRENT USE OF INSULIN: ICD-10-CM

## 2021-05-20 DIAGNOSIS — I10 ESSENTIAL HYPERTENSION: ICD-10-CM

## 2021-05-20 DIAGNOSIS — Z95.5 HISTORY OF CORONARY ARTERY STENT PLACEMENT: ICD-10-CM

## 2021-05-20 DIAGNOSIS — E78.00 HYPERCHOLESTEROLEMIA: ICD-10-CM

## 2021-05-20 DIAGNOSIS — Z79.01 CHRONIC ANTICOAGULATION: ICD-10-CM

## 2021-05-20 DIAGNOSIS — E66.3 OVERWEIGHT: ICD-10-CM

## 2021-05-20 DIAGNOSIS — I44.7 LEFT BUNDLE BRANCH BLOCK: ICD-10-CM

## 2021-05-20 DIAGNOSIS — I35.9 AORTIC VALVE DISEASE: ICD-10-CM

## 2021-05-20 LAB
INR PPP: 3.3 (ref 0.8–1.2)
PROTHROMBIN TIME: 32.9 SEC (ref 9–12.5)

## 2021-05-20 PROCEDURE — 99999 PR PBB SHADOW E&M-EST. PATIENT-LVL III: CPT | Mod: PBBFAC,,, | Performed by: INTERNAL MEDICINE

## 2021-05-20 PROCEDURE — 93005 ELECTROCARDIOGRAM TRACING: CPT

## 2021-05-20 PROCEDURE — 1159F PR MEDICATION LIST DOCUMENTED IN MEDICAL RECORD: ICD-10-PCS | Mod: S$GLB,,, | Performed by: INTERNAL MEDICINE

## 2021-05-20 PROCEDURE — 36415 COLL VENOUS BLD VENIPUNCTURE: CPT | Performed by: INTERNAL MEDICINE

## 2021-05-20 PROCEDURE — 99214 PR OFFICE/OUTPT VISIT, EST, LEVL IV, 30-39 MIN: ICD-10-PCS | Mod: 25,S$GLB,, | Performed by: INTERNAL MEDICINE

## 2021-05-20 PROCEDURE — 85610 PROTHROMBIN TIME: CPT | Performed by: INTERNAL MEDICINE

## 2021-05-20 PROCEDURE — 1157F ADVNC CARE PLAN IN RCRD: CPT | Mod: S$GLB,,, | Performed by: INTERNAL MEDICINE

## 2021-05-20 PROCEDURE — 93000 PR ELECTROCARDIOGRAM, COMPLETE: ICD-10-PCS | Mod: S$GLB,,, | Performed by: INTERNAL MEDICINE

## 2021-05-20 PROCEDURE — 3008F PR BODY MASS INDEX (BMI) DOCUMENTED: ICD-10-PCS | Mod: CPTII,S$GLB,, | Performed by: INTERNAL MEDICINE

## 2021-05-20 PROCEDURE — 93000 ELECTROCARDIOGRAM COMPLETE: CPT | Mod: S$GLB,,, | Performed by: INTERNAL MEDICINE

## 2021-05-20 PROCEDURE — 99214 OFFICE O/P EST MOD 30 MIN: CPT | Mod: 25,S$GLB,, | Performed by: INTERNAL MEDICINE

## 2021-05-20 PROCEDURE — 99999 PR PBB SHADOW E&M-EST. PATIENT-LVL III: ICD-10-PCS | Mod: PBBFAC,,, | Performed by: INTERNAL MEDICINE

## 2021-05-20 PROCEDURE — 1157F PR ADVANCE CARE PLAN OR EQUIV PRESENT IN MEDICAL RECORD: ICD-10-PCS | Mod: S$GLB,,, | Performed by: INTERNAL MEDICINE

## 2021-05-20 PROCEDURE — 1159F MED LIST DOCD IN RCRD: CPT | Mod: S$GLB,,, | Performed by: INTERNAL MEDICINE

## 2021-05-20 PROCEDURE — 3008F BODY MASS INDEX DOCD: CPT | Mod: CPTII,S$GLB,, | Performed by: INTERNAL MEDICINE

## 2021-05-20 RX ORDER — ROSUVASTATIN CALCIUM 20 MG/1
20 TABLET, COATED ORAL DAILY
Qty: 90 TABLET | Refills: 3 | Status: SHIPPED | OUTPATIENT
Start: 2021-05-20 | End: 2022-03-22 | Stop reason: SDUPTHER

## 2021-05-20 RX ORDER — WARFARIN 3 MG/1
TABLET ORAL
Qty: 140 TABLET | Refills: 3 | Status: SHIPPED | OUTPATIENT
Start: 2021-05-20 | End: 2021-11-27

## 2021-05-20 RX ORDER — RAMIPRIL 10 MG/1
10 CAPSULE ORAL DAILY
Qty: 90 CAPSULE | Refills: 3 | Status: SHIPPED | OUTPATIENT
Start: 2021-05-20 | End: 2022-03-22 | Stop reason: SDUPTHER

## 2021-05-20 RX ORDER — AMLODIPINE BESYLATE 10 MG/1
10 TABLET ORAL DAILY
Qty: 90 TABLET | Refills: 3 | Status: SHIPPED | OUTPATIENT
Start: 2021-05-20 | End: 2022-03-22 | Stop reason: SDUPTHER

## 2021-05-20 RX ORDER — CHLORTHALIDONE 25 MG/1
25 TABLET ORAL DAILY
Qty: 90 TABLET | Refills: 3 | Status: SHIPPED | OUTPATIENT
Start: 2021-05-20 | End: 2022-03-22 | Stop reason: SDUPTHER

## 2021-05-20 RX ORDER — EZETIMIBE 10 MG/1
10 TABLET ORAL DAILY
Qty: 90 TABLET | Refills: 3 | Status: SHIPPED | OUTPATIENT
Start: 2021-05-20 | End: 2022-03-22 | Stop reason: SDUPTHER

## 2021-05-25 ENCOUNTER — HOSPITAL ENCOUNTER (OUTPATIENT)
Dept: CARDIOLOGY | Facility: OTHER | Age: 71
Discharge: HOME | End: 2021-05-25
Attending: INTERNAL MEDICINE
Payer: OTHER MISCELLANEOUS

## 2021-05-25 VITALS
WEIGHT: 213 LBS | DIASTOLIC BLOOD PRESSURE: 65 MMHG | SYSTOLIC BLOOD PRESSURE: 125 MMHG | BODY MASS INDEX: 31.55 KG/M2 | HEIGHT: 69 IN | HEART RATE: 64 BPM

## 2021-05-25 DIAGNOSIS — I50.22 HEART FAILURE, SYSTOLIC, CHRONIC: ICD-10-CM

## 2021-05-25 LAB
AV INDEX (PROSTH): 0.53
AV MEAN GRADIENT: 16 MMHG
AV PEAK GRADIENT: 33 MMHG
AV VALVE AREA: 1.59 CM2
AV VELOCITY RATIO: 0.51
BSA FOR ECHO PROCEDURE: 2.17 M2
CV ECHO LV RWT: 0.3 CM
DOP CALC AO PEAK VEL: 2.86 M/S
DOP CALC AO VTI: 51.53 CM
DOP CALC LVOT AREA: 3 CM2
DOP CALC LVOT DIAMETER: 1.96 CM
DOP CALC LVOT PEAK VEL: 1.47 M/S
DOP CALC LVOT STROKE VOLUME: 82.12 CM3
DOP CALCLVOT PEAK VEL VTI: 27.23 CM
ECHO LV POSTERIOR WALL: 0.89 CM (ref 0.6–1.1)
EJECTION FRACTION: 50 %
FRACTIONAL SHORTENING: 45 % (ref 28–44)
INTERVENTRICULAR SEPTUM: 1.07 CM (ref 0.6–1.1)
LA MAJOR: 5.09 CM
LA MINOR: 5.62 CM
LA WIDTH: 3.94 CM
LEFT ATRIUM SIZE: 3.99 CM
LEFT ATRIUM VOLUME INDEX MOD: 32.5 ML/M2
LEFT ATRIUM VOLUME INDEX: 33.7 ML/M2
LEFT ATRIUM VOLUME MOD: 69 CM3
LEFT ATRIUM VOLUME: 71.38 CM3
LEFT INTERNAL DIMENSION IN SYSTOLE: 3.32 CM (ref 2.1–4)
LEFT VENTRICLE DIASTOLIC VOLUME INDEX: 85.45 ML/M2
LEFT VENTRICLE DIASTOLIC VOLUME: 181.15 ML
LEFT VENTRICLE MASS INDEX: 114 G/M2
LEFT VENTRICLE SYSTOLIC VOLUME INDEX: 21.1 ML/M2
LEFT VENTRICLE SYSTOLIC VOLUME: 44.78 ML
LEFT VENTRICULAR INTERNAL DIMENSION IN DIASTOLE: 6.02 CM (ref 3.5–6)
LEFT VENTRICULAR MASS: 241.88 G
PISA MRMAX VEL: 0.05 M/S
PISA TR MAX VEL: 3.04 M/S
PULM VEIN S/D RATIO: 2.46
PV PEAK D VEL: 0.24 M/S
PV PEAK S VEL: 0.59 M/S
PV PEAK VELOCITY: 1.06 CM/S
RA MAJOR: 5.14 CM
RA PRESSURE: 3 MMHG
SINUS: 2.5 CM
TDI LATERAL: 0.09 M/S
TDI SEPTAL: 0.05 M/S
TDI: 0.07 M/S
TR MAX PG: 37 MMHG
TV REST PULMONARY ARTERY PRESSURE: 40 MMHG

## 2021-05-25 PROCEDURE — 93306 TTE W/DOPPLER COMPLETE: CPT

## 2021-05-25 PROCEDURE — 93306 TTE W/DOPPLER COMPLETE: CPT | Mod: 26,,, | Performed by: INTERNAL MEDICINE

## 2021-05-25 PROCEDURE — 93306 ECHO (CUPID ONLY): ICD-10-PCS | Mod: 26,,, | Performed by: INTERNAL MEDICINE

## 2021-06-01 ENCOUNTER — OFFICE VISIT (OUTPATIENT)
Dept: FAMILY MEDICINE | Facility: CLINIC | Age: 71
End: 2021-06-01
Payer: MEDICARE

## 2021-06-01 VITALS
OXYGEN SATURATION: 98 % | WEIGHT: 211.19 LBS | BODY MASS INDEX: 31.28 KG/M2 | HEIGHT: 69 IN | HEART RATE: 69 BPM | SYSTOLIC BLOOD PRESSURE: 136 MMHG | DIASTOLIC BLOOD PRESSURE: 72 MMHG

## 2021-06-01 DIAGNOSIS — F33.0 MILD EPISODE OF RECURRENT MAJOR DEPRESSIVE DISORDER: ICD-10-CM

## 2021-06-01 DIAGNOSIS — E78.5 DYSLIPIDEMIA: ICD-10-CM

## 2021-06-01 DIAGNOSIS — Z95.2 S/P AVR (AORTIC VALVE REPLACEMENT): ICD-10-CM

## 2021-06-01 DIAGNOSIS — E11.65 TYPE 2 DIABETES MELLITUS WITH HYPERGLYCEMIA, WITHOUT LONG-TERM CURRENT USE OF INSULIN: ICD-10-CM

## 2021-06-01 DIAGNOSIS — D50.8 OTHER IRON DEFICIENCY ANEMIA: ICD-10-CM

## 2021-06-01 DIAGNOSIS — Z00.00 ANNUAL PHYSICAL EXAM: Primary | ICD-10-CM

## 2021-06-01 DIAGNOSIS — I10 ESSENTIAL HYPERTENSION: ICD-10-CM

## 2021-06-01 DIAGNOSIS — I25.10 CORONARY ARTERY DISEASE INVOLVING NATIVE CORONARY ARTERY WITHOUT ANGINA PECTORIS, UNSPECIFIED WHETHER NATIVE OR TRANSPLANTED HEART: ICD-10-CM

## 2021-06-01 PROBLEM — D50.9 IRON DEFICIENCY ANEMIA: Status: ACTIVE | Noted: 2021-06-01

## 2021-06-01 PROCEDURE — 1157F ADVNC CARE PLAN IN RCRD: CPT | Mod: S$GLB,,, | Performed by: FAMILY MEDICINE

## 2021-06-01 PROCEDURE — 99999 PR PBB SHADOW E&M-EST. PATIENT-LVL III: CPT | Mod: PBBFAC,,, | Performed by: FAMILY MEDICINE

## 2021-06-01 PROCEDURE — 1101F PR PT FALLS ASSESS DOC 0-1 FALLS W/OUT INJ PAST YR: ICD-10-PCS | Mod: CPTII,S$GLB,, | Performed by: FAMILY MEDICINE

## 2021-06-01 PROCEDURE — 1125F AMNT PAIN NOTED PAIN PRSNT: CPT | Mod: S$GLB,,, | Performed by: FAMILY MEDICINE

## 2021-06-01 PROCEDURE — 3288F PR FALLS RISK ASSESSMENT DOCUMENTED: ICD-10-PCS | Mod: CPTII,S$GLB,, | Performed by: FAMILY MEDICINE

## 2021-06-01 PROCEDURE — 3008F BODY MASS INDEX DOCD: CPT | Mod: CPTII,S$GLB,, | Performed by: FAMILY MEDICINE

## 2021-06-01 PROCEDURE — 1125F PR PAIN SEVERITY QUANTIFIED, PAIN PRESENT: ICD-10-PCS | Mod: S$GLB,,, | Performed by: FAMILY MEDICINE

## 2021-06-01 PROCEDURE — 99999 PR PBB SHADOW E&M-EST. PATIENT-LVL III: ICD-10-PCS | Mod: PBBFAC,,, | Performed by: FAMILY MEDICINE

## 2021-06-01 PROCEDURE — 99387 INIT PM E/M NEW PAT 65+ YRS: CPT | Mod: S$GLB,,, | Performed by: FAMILY MEDICINE

## 2021-06-01 PROCEDURE — 3008F PR BODY MASS INDEX (BMI) DOCUMENTED: ICD-10-PCS | Mod: CPTII,S$GLB,, | Performed by: FAMILY MEDICINE

## 2021-06-01 PROCEDURE — 1157F PR ADVANCE CARE PLAN OR EQUIV PRESENT IN MEDICAL RECORD: ICD-10-PCS | Mod: S$GLB,,, | Performed by: FAMILY MEDICINE

## 2021-06-01 PROCEDURE — 99387 PR PREVENTIVE VISIT,NEW,65 & OVER: ICD-10-PCS | Mod: S$GLB,,, | Performed by: FAMILY MEDICINE

## 2021-06-01 PROCEDURE — 1101F PT FALLS ASSESS-DOCD LE1/YR: CPT | Mod: CPTII,S$GLB,, | Performed by: FAMILY MEDICINE

## 2021-06-01 PROCEDURE — 3288F FALL RISK ASSESSMENT DOCD: CPT | Mod: CPTII,S$GLB,, | Performed by: FAMILY MEDICINE

## 2021-06-01 RX ORDER — FERROUS SULFATE 325(65) MG
325 TABLET ORAL
Qty: 90 TABLET | Refills: 3 | Status: SHIPPED | OUTPATIENT
Start: 2021-06-01 | End: 2022-06-01

## 2021-06-02 ENCOUNTER — LAB VISIT (OUTPATIENT)
Dept: LAB | Facility: HOSPITAL | Age: 71
End: 2021-06-02
Attending: INTERNAL MEDICINE
Payer: MEDICARE

## 2021-06-02 ENCOUNTER — IMMUNIZATION (OUTPATIENT)
Dept: PHARMACY | Facility: CLINIC | Age: 71
End: 2021-06-02

## 2021-06-02 ENCOUNTER — IMMUNIZATION (OUTPATIENT)
Dept: PHARMACY | Facility: CLINIC | Age: 71
End: 2021-06-02
Payer: MEDICARE

## 2021-06-02 DIAGNOSIS — Z79.01 CHRONIC ANTICOAGULATION: ICD-10-CM

## 2021-06-02 DIAGNOSIS — Z95.2 HISTORY OF HEART VALVE REPLACEMENT: ICD-10-CM

## 2021-06-02 LAB
INR PPP: 2.9 (ref 0.8–1.2)
PROTHROMBIN TIME: 28.9 SEC (ref 9–12.5)

## 2021-06-02 PROCEDURE — 36415 COLL VENOUS BLD VENIPUNCTURE: CPT | Performed by: INTERNAL MEDICINE

## 2021-06-02 PROCEDURE — 85610 PROTHROMBIN TIME: CPT | Performed by: INTERNAL MEDICINE

## 2021-06-03 ENCOUNTER — LAB VISIT (OUTPATIENT)
Dept: LAB | Facility: HOSPITAL | Age: 71
End: 2021-06-03
Attending: FAMILY MEDICINE
Payer: MEDICARE

## 2021-06-03 DIAGNOSIS — D50.8 OTHER IRON DEFICIENCY ANEMIA: ICD-10-CM

## 2021-06-03 DIAGNOSIS — I25.10 CORONARY ARTERY DISEASE INVOLVING NATIVE CORONARY ARTERY WITHOUT ANGINA PECTORIS, UNSPECIFIED WHETHER NATIVE OR TRANSPLANTED HEART: ICD-10-CM

## 2021-06-03 DIAGNOSIS — E11.65 TYPE 2 DIABETES MELLITUS WITH HYPERGLYCEMIA, WITHOUT LONG-TERM CURRENT USE OF INSULIN: ICD-10-CM

## 2021-06-03 DIAGNOSIS — Z95.2 S/P AVR (AORTIC VALVE REPLACEMENT): ICD-10-CM

## 2021-06-03 DIAGNOSIS — F33.0 MILD EPISODE OF RECURRENT MAJOR DEPRESSIVE DISORDER: ICD-10-CM

## 2021-06-03 DIAGNOSIS — Z00.00 ANNUAL PHYSICAL EXAM: ICD-10-CM

## 2021-06-03 DIAGNOSIS — I10 ESSENTIAL HYPERTENSION: ICD-10-CM

## 2021-06-03 DIAGNOSIS — E78.5 DYSLIPIDEMIA: ICD-10-CM

## 2021-06-03 LAB
ALBUMIN SERPL BCP-MCNC: 4.1 G/DL (ref 3.5–5.2)
ALP SERPL-CCNC: 51 U/L (ref 55–135)
ALT SERPL W/O P-5'-P-CCNC: 21 U/L (ref 10–44)
ANION GAP SERPL CALC-SCNC: 10 MMOL/L (ref 8–16)
AST SERPL-CCNC: 21 U/L (ref 10–40)
BASOPHILS # BLD AUTO: 0.07 K/UL (ref 0–0.2)
BASOPHILS NFR BLD: 1.1 % (ref 0–1.9)
BILIRUB SERPL-MCNC: 0.7 MG/DL (ref 0.1–1)
BUN SERPL-MCNC: 21 MG/DL (ref 8–23)
CALCIUM SERPL-MCNC: 10.1 MG/DL (ref 8.7–10.5)
CHLORIDE SERPL-SCNC: 100 MMOL/L (ref 95–110)
CHOLEST SERPL-MCNC: 112 MG/DL (ref 120–199)
CHOLEST/HDLC SERPL: 3.4 {RATIO} (ref 2–5)
CO2 SERPL-SCNC: 27 MMOL/L (ref 23–29)
CREAT SERPL-MCNC: 1 MG/DL (ref 0.5–1.4)
DIFFERENTIAL METHOD: ABNORMAL
EOSINOPHIL # BLD AUTO: 0.1 K/UL (ref 0–0.5)
EOSINOPHIL NFR BLD: 0.8 % (ref 0–8)
ERYTHROCYTE [DISTWIDTH] IN BLOOD BY AUTOMATED COUNT: 15.3 % (ref 11.5–14.5)
EST. GFR  (AFRICAN AMERICAN): >60 ML/MIN/1.73 M^2
EST. GFR  (NON AFRICAN AMERICAN): >60 ML/MIN/1.73 M^2
ESTIMATED AVG GLUCOSE: 120 MG/DL (ref 68–131)
GLUCOSE SERPL-MCNC: 111 MG/DL (ref 70–110)
HBA1C MFR BLD: 5.8 % (ref 4–5.6)
HCT VFR BLD AUTO: 38.6 % (ref 40–54)
HDLC SERPL-MCNC: 33 MG/DL (ref 40–75)
HDLC SERPL: 29.5 % (ref 20–50)
HGB BLD-MCNC: 12.3 G/DL (ref 14–18)
IMM GRANULOCYTES # BLD AUTO: 0.02 K/UL (ref 0–0.04)
IMM GRANULOCYTES NFR BLD AUTO: 0.3 % (ref 0–0.5)
LDLC SERPL CALC-MCNC: 49 MG/DL (ref 63–159)
LYMPHOCYTES # BLD AUTO: 1.5 K/UL (ref 1–4.8)
LYMPHOCYTES NFR BLD: 24.5 % (ref 18–48)
MCH RBC QN AUTO: 27.3 PG (ref 27–31)
MCHC RBC AUTO-ENTMCNC: 31.9 G/DL (ref 32–36)
MCV RBC AUTO: 86 FL (ref 82–98)
MONOCYTES # BLD AUTO: 0.7 K/UL (ref 0.3–1)
MONOCYTES NFR BLD: 11.9 % (ref 4–15)
NEUTROPHILS # BLD AUTO: 3.8 K/UL (ref 1.8–7.7)
NEUTROPHILS NFR BLD: 61.4 % (ref 38–73)
NONHDLC SERPL-MCNC: 79 MG/DL
NRBC BLD-RTO: 0 /100 WBC
PLATELET # BLD AUTO: 299 K/UL (ref 150–450)
PMV BLD AUTO: 10.5 FL (ref 9.2–12.9)
POTASSIUM SERPL-SCNC: 4 MMOL/L (ref 3.5–5.1)
PROT SERPL-MCNC: 7.4 G/DL (ref 6–8.4)
RBC # BLD AUTO: 4.51 M/UL (ref 4.6–6.2)
SODIUM SERPL-SCNC: 137 MMOL/L (ref 136–145)
TRIGL SERPL-MCNC: 150 MG/DL (ref 30–150)
TSH SERPL DL<=0.005 MIU/L-ACNC: 1.56 UIU/ML (ref 0.4–4)
WBC # BLD AUTO: 6.21 K/UL (ref 3.9–12.7)

## 2021-06-03 PROCEDURE — 83036 HEMOGLOBIN GLYCOSYLATED A1C: CPT | Performed by: FAMILY MEDICINE

## 2021-06-03 PROCEDURE — 36415 COLL VENOUS BLD VENIPUNCTURE: CPT | Mod: PO | Performed by: FAMILY MEDICINE

## 2021-06-03 PROCEDURE — 80061 LIPID PANEL: CPT | Performed by: FAMILY MEDICINE

## 2021-06-03 PROCEDURE — 80053 COMPREHEN METABOLIC PANEL: CPT | Performed by: FAMILY MEDICINE

## 2021-06-03 PROCEDURE — 86803 HEPATITIS C AB TEST: CPT | Performed by: FAMILY MEDICINE

## 2021-06-03 PROCEDURE — 84443 ASSAY THYROID STIM HORMONE: CPT | Performed by: FAMILY MEDICINE

## 2021-06-03 PROCEDURE — 85025 COMPLETE CBC W/AUTO DIFF WBC: CPT | Performed by: FAMILY MEDICINE

## 2021-06-04 LAB — HCV AB SERPL QL IA: NEGATIVE

## 2021-07-02 ENCOUNTER — LAB VISIT (OUTPATIENT)
Dept: LAB | Facility: HOSPITAL | Age: 71
End: 2021-07-02
Attending: INTERNAL MEDICINE
Payer: MEDICARE

## 2021-07-02 DIAGNOSIS — Z79.01 CHRONIC ANTICOAGULATION: ICD-10-CM

## 2021-07-02 DIAGNOSIS — Z95.2 HISTORY OF HEART VALVE REPLACEMENT: ICD-10-CM

## 2021-07-02 LAB
INR PPP: 3.2 (ref 0.8–1.2)
PROTHROMBIN TIME: 32.4 SEC (ref 9–12.5)

## 2021-07-02 PROCEDURE — 36415 COLL VENOUS BLD VENIPUNCTURE: CPT | Performed by: INTERNAL MEDICINE

## 2021-07-02 PROCEDURE — 85610 PROTHROMBIN TIME: CPT | Performed by: INTERNAL MEDICINE

## 2021-07-06 ENCOUNTER — PATIENT MESSAGE (OUTPATIENT)
Dept: ADMINISTRATIVE | Facility: HOSPITAL | Age: 71
End: 2021-07-06

## 2021-07-21 ENCOUNTER — LAB VISIT (OUTPATIENT)
Dept: LAB | Facility: HOSPITAL | Age: 71
End: 2021-07-21
Payer: OTHER MISCELLANEOUS

## 2021-07-21 DIAGNOSIS — Z79.01 CHRONIC ANTICOAGULATION: ICD-10-CM

## 2021-07-21 DIAGNOSIS — Z95.2 HISTORY OF HEART VALVE REPLACEMENT: ICD-10-CM

## 2021-07-21 LAB
INR PPP: 2.6 (ref 0.8–1.2)
PROTHROMBIN TIME: 26.1 SEC (ref 9–12.5)

## 2021-07-21 PROCEDURE — 85610 PROTHROMBIN TIME: CPT | Performed by: INTERNAL MEDICINE

## 2021-07-21 PROCEDURE — 36415 COLL VENOUS BLD VENIPUNCTURE: CPT | Performed by: INTERNAL MEDICINE

## 2021-08-18 ENCOUNTER — LAB VISIT (OUTPATIENT)
Dept: LAB | Facility: HOSPITAL | Age: 71
End: 2021-08-18
Attending: INTERNAL MEDICINE
Payer: MEDICARE

## 2021-08-18 DIAGNOSIS — Z95.2 HISTORY OF HEART VALVE REPLACEMENT: ICD-10-CM

## 2021-08-18 DIAGNOSIS — Z79.01 CHRONIC ANTICOAGULATION: ICD-10-CM

## 2021-08-18 LAB
INR PPP: 3.1 (ref 0.8–1.2)
PROTHROMBIN TIME: 31.2 SEC (ref 9–12.5)

## 2021-08-18 PROCEDURE — 85610 PROTHROMBIN TIME: CPT | Performed by: INTERNAL MEDICINE

## 2021-08-18 PROCEDURE — 36415 COLL VENOUS BLD VENIPUNCTURE: CPT | Performed by: INTERNAL MEDICINE

## 2021-09-13 ENCOUNTER — OFFICE VISIT (OUTPATIENT)
Dept: FAMILY MEDICINE | Facility: CLINIC | Age: 71
End: 2021-09-13
Payer: MEDICARE

## 2021-09-13 ENCOUNTER — TELEPHONE (OUTPATIENT)
Dept: FAMILY MEDICINE | Facility: CLINIC | Age: 71
End: 2021-09-13

## 2021-09-13 VITALS
WEIGHT: 204.56 LBS | HEIGHT: 69 IN | BODY MASS INDEX: 30.3 KG/M2 | OXYGEN SATURATION: 98 % | DIASTOLIC BLOOD PRESSURE: 64 MMHG | SYSTOLIC BLOOD PRESSURE: 120 MMHG | HEART RATE: 67 BPM

## 2021-09-13 DIAGNOSIS — I10 ESSENTIAL HYPERTENSION: ICD-10-CM

## 2021-09-13 DIAGNOSIS — H66.93 ACUTE BACTERIAL OTITIS MEDIA, BILATERAL: ICD-10-CM

## 2021-09-13 DIAGNOSIS — E11.59 TYPE 2 DIABETES MELLITUS WITH OTHER CIRCULATORY COMPLICATION, WITHOUT LONG-TERM CURRENT USE OF INSULIN: ICD-10-CM

## 2021-09-13 DIAGNOSIS — M70.22 OLECRANON BURSITIS OF LEFT ELBOW: Primary | ICD-10-CM

## 2021-09-13 PROCEDURE — 99214 OFFICE O/P EST MOD 30 MIN: CPT | Mod: S$GLB,,, | Performed by: FAMILY MEDICINE

## 2021-09-13 PROCEDURE — 3074F SYST BP LT 130 MM HG: CPT | Mod: CPTII,S$GLB,, | Performed by: FAMILY MEDICINE

## 2021-09-13 PROCEDURE — 1126F AMNT PAIN NOTED NONE PRSNT: CPT | Mod: CPTII,S$GLB,, | Performed by: FAMILY MEDICINE

## 2021-09-13 PROCEDURE — 3078F DIAST BP <80 MM HG: CPT | Mod: CPTII,S$GLB,, | Performed by: FAMILY MEDICINE

## 2021-09-13 PROCEDURE — 3066F PR DOCUMENTATION OF TREATMENT FOR NEPHROPATHY: ICD-10-PCS | Mod: CPTII,S$GLB,, | Performed by: FAMILY MEDICINE

## 2021-09-13 PROCEDURE — 1157F ADVNC CARE PLAN IN RCRD: CPT | Mod: CPTII,S$GLB,, | Performed by: FAMILY MEDICINE

## 2021-09-13 PROCEDURE — 3060F PR POS MICROALBUMINURIA RESULT DOCUMENTED/REVIEW: ICD-10-PCS | Mod: CPTII,S$GLB,, | Performed by: FAMILY MEDICINE

## 2021-09-13 PROCEDURE — 4010F PR ACE/ARB THEARPY RXD/TAKEN: ICD-10-PCS | Mod: CPTII,S$GLB,, | Performed by: FAMILY MEDICINE

## 2021-09-13 PROCEDURE — 3288F PR FALLS RISK ASSESSMENT DOCUMENTED: ICD-10-PCS | Mod: CPTII,S$GLB,, | Performed by: FAMILY MEDICINE

## 2021-09-13 PROCEDURE — 99999 PR PBB SHADOW E&M-EST. PATIENT-LVL IV: CPT | Mod: PBBFAC,,, | Performed by: FAMILY MEDICINE

## 2021-09-13 PROCEDURE — 1101F PR PT FALLS ASSESS DOC 0-1 FALLS W/OUT INJ PAST YR: ICD-10-PCS | Mod: CPTII,S$GLB,, | Performed by: FAMILY MEDICINE

## 2021-09-13 PROCEDURE — 3066F NEPHROPATHY DOC TX: CPT | Mod: CPTII,S$GLB,, | Performed by: FAMILY MEDICINE

## 2021-09-13 PROCEDURE — 1101F PT FALLS ASSESS-DOCD LE1/YR: CPT | Mod: CPTII,S$GLB,, | Performed by: FAMILY MEDICINE

## 2021-09-13 PROCEDURE — 99214 PR OFFICE/OUTPT VISIT, EST, LEVL IV, 30-39 MIN: ICD-10-PCS | Mod: S$GLB,,, | Performed by: FAMILY MEDICINE

## 2021-09-13 PROCEDURE — 3044F HG A1C LEVEL LT 7.0%: CPT | Mod: CPTII,S$GLB,, | Performed by: FAMILY MEDICINE

## 2021-09-13 PROCEDURE — 3288F FALL RISK ASSESSMENT DOCD: CPT | Mod: CPTII,S$GLB,, | Performed by: FAMILY MEDICINE

## 2021-09-13 PROCEDURE — 1159F PR MEDICATION LIST DOCUMENTED IN MEDICAL RECORD: ICD-10-PCS | Mod: CPTII,S$GLB,, | Performed by: FAMILY MEDICINE

## 2021-09-13 PROCEDURE — 1157F PR ADVANCE CARE PLAN OR EQUIV PRESENT IN MEDICAL RECORD: ICD-10-PCS | Mod: CPTII,S$GLB,, | Performed by: FAMILY MEDICINE

## 2021-09-13 PROCEDURE — 1126F PR PAIN SEVERITY QUANTIFIED, NO PAIN PRESENT: ICD-10-PCS | Mod: CPTII,S$GLB,, | Performed by: FAMILY MEDICINE

## 2021-09-13 PROCEDURE — 3074F PR MOST RECENT SYSTOLIC BLOOD PRESSURE < 130 MM HG: ICD-10-PCS | Mod: CPTII,S$GLB,, | Performed by: FAMILY MEDICINE

## 2021-09-13 PROCEDURE — 99999 PR PBB SHADOW E&M-EST. PATIENT-LVL IV: ICD-10-PCS | Mod: PBBFAC,,, | Performed by: FAMILY MEDICINE

## 2021-09-13 PROCEDURE — 3008F PR BODY MASS INDEX (BMI) DOCUMENTED: ICD-10-PCS | Mod: CPTII,S$GLB,, | Performed by: FAMILY MEDICINE

## 2021-09-13 PROCEDURE — 1160F RVW MEDS BY RX/DR IN RCRD: CPT | Mod: CPTII,S$GLB,, | Performed by: FAMILY MEDICINE

## 2021-09-13 PROCEDURE — 3078F PR MOST RECENT DIASTOLIC BLOOD PRESSURE < 80 MM HG: ICD-10-PCS | Mod: CPTII,S$GLB,, | Performed by: FAMILY MEDICINE

## 2021-09-13 PROCEDURE — 1159F MED LIST DOCD IN RCRD: CPT | Mod: CPTII,S$GLB,, | Performed by: FAMILY MEDICINE

## 2021-09-13 PROCEDURE — 3044F PR MOST RECENT HEMOGLOBIN A1C LEVEL <7.0%: ICD-10-PCS | Mod: CPTII,S$GLB,, | Performed by: FAMILY MEDICINE

## 2021-09-13 PROCEDURE — 4010F ACE/ARB THERAPY RXD/TAKEN: CPT | Mod: CPTII,S$GLB,, | Performed by: FAMILY MEDICINE

## 2021-09-13 PROCEDURE — 3060F POS MICROALBUMINURIA REV: CPT | Mod: CPTII,S$GLB,, | Performed by: FAMILY MEDICINE

## 2021-09-13 PROCEDURE — 1160F PR REVIEW ALL MEDS BY PRESCRIBER/CLIN PHARMACIST DOCUMENTED: ICD-10-PCS | Mod: CPTII,S$GLB,, | Performed by: FAMILY MEDICINE

## 2021-09-13 PROCEDURE — 3008F BODY MASS INDEX DOCD: CPT | Mod: CPTII,S$GLB,, | Performed by: FAMILY MEDICINE

## 2021-09-13 RX ORDER — AMOXICILLIN 500 MG/1
500 CAPSULE ORAL 3 TIMES DAILY
Qty: 30 CAPSULE | Refills: 0 | Status: SHIPPED | OUTPATIENT
Start: 2021-09-13 | End: 2021-09-23

## 2021-09-13 RX ORDER — GUAIFENESIN 600 MG/1
600 TABLET, EXTENDED RELEASE ORAL 2 TIMES DAILY
Qty: 20 TABLET | Refills: 0 | Status: SHIPPED | OUTPATIENT
Start: 2021-09-13 | End: 2021-09-23

## 2021-09-13 RX ORDER — CETIRIZINE HYDROCHLORIDE 10 MG/1
10 TABLET ORAL DAILY
Qty: 10 TABLET | Refills: 0 | Status: SHIPPED | OUTPATIENT
Start: 2021-09-13 | End: 2021-12-01

## 2021-09-17 ENCOUNTER — LAB VISIT (OUTPATIENT)
Dept: LAB | Facility: HOSPITAL | Age: 71
End: 2021-09-17
Attending: INTERNAL MEDICINE
Payer: MEDICARE

## 2021-09-17 ENCOUNTER — TELEPHONE (OUTPATIENT)
Dept: CARDIOLOGY | Facility: CLINIC | Age: 71
End: 2021-09-17

## 2021-09-17 DIAGNOSIS — I48.0 PAROXYSMAL ATRIAL FIBRILLATION: ICD-10-CM

## 2021-09-17 DIAGNOSIS — Z79.01 CHRONIC ANTICOAGULATION: ICD-10-CM

## 2021-09-17 DIAGNOSIS — I48.0 PAROXYSMAL ATRIAL FIBRILLATION: Primary | ICD-10-CM

## 2021-09-17 LAB
INR PPP: 3.2 (ref 0.8–1.2)
PROTHROMBIN TIME: 32.2 SEC (ref 9–12.5)

## 2021-09-17 PROCEDURE — 85610 PROTHROMBIN TIME: CPT | Performed by: INTERNAL MEDICINE

## 2021-09-17 PROCEDURE — 36415 COLL VENOUS BLD VENIPUNCTURE: CPT | Performed by: INTERNAL MEDICINE

## 2021-09-20 ENCOUNTER — OFFICE VISIT (OUTPATIENT)
Dept: CARDIOLOGY | Facility: CLINIC | Age: 71
End: 2021-09-20
Attending: INTERNAL MEDICINE
Payer: MEDICARE

## 2021-09-20 VITALS
HEIGHT: 69 IN | SYSTOLIC BLOOD PRESSURE: 140 MMHG | WEIGHT: 205.94 LBS | OXYGEN SATURATION: 98 % | DIASTOLIC BLOOD PRESSURE: 68 MMHG | HEART RATE: 65 BPM | BODY MASS INDEX: 30.5 KG/M2

## 2021-09-20 DIAGNOSIS — I25.10 CORONARY ARTERY DISEASE INVOLVING NATIVE CORONARY ARTERY OF NATIVE HEART WITHOUT ANGINA PECTORIS: ICD-10-CM

## 2021-09-20 DIAGNOSIS — E78.00 HYPERCHOLESTEROLEMIA: ICD-10-CM

## 2021-09-20 DIAGNOSIS — I50.22 HEART FAILURE, SYSTOLIC, CHRONIC: ICD-10-CM

## 2021-09-20 DIAGNOSIS — Z95.2 HISTORY OF HEART VALVE REPLACEMENT: ICD-10-CM

## 2021-09-20 DIAGNOSIS — I35.9 AORTIC VALVE DISEASE: ICD-10-CM

## 2021-09-20 DIAGNOSIS — E11.59 TYPE 2 DIABETES MELLITUS WITH OTHER CIRCULATORY COMPLICATION, WITHOUT LONG-TERM CURRENT USE OF INSULIN: ICD-10-CM

## 2021-09-20 DIAGNOSIS — I44.7 LEFT BUNDLE BRANCH BLOCK: ICD-10-CM

## 2021-09-20 DIAGNOSIS — K21.9 GASTROESOPHAGEAL REFLUX DISEASE WITHOUT ESOPHAGITIS: ICD-10-CM

## 2021-09-20 DIAGNOSIS — Z95.5 HISTORY OF CORONARY ARTERY STENT PLACEMENT: ICD-10-CM

## 2021-09-20 DIAGNOSIS — Z79.01 CHRONIC ANTICOAGULATION: ICD-10-CM

## 2021-09-20 DIAGNOSIS — E66.3 OVERWEIGHT: ICD-10-CM

## 2021-09-20 DIAGNOSIS — I10 ESSENTIAL HYPERTENSION: ICD-10-CM

## 2021-09-20 PROCEDURE — 99999 PR PBB SHADOW E&M-EST. PATIENT-LVL III: CPT | Mod: PBBFAC,,, | Performed by: INTERNAL MEDICINE

## 2021-09-20 PROCEDURE — 99214 OFFICE O/P EST MOD 30 MIN: CPT | Mod: S$GLB,,, | Performed by: INTERNAL MEDICINE

## 2021-09-20 PROCEDURE — 99214 PR OFFICE/OUTPT VISIT, EST, LEVL IV, 30-39 MIN: ICD-10-PCS | Mod: S$GLB,,, | Performed by: INTERNAL MEDICINE

## 2021-09-20 PROCEDURE — 99999 PR PBB SHADOW E&M-EST. PATIENT-LVL III: ICD-10-PCS | Mod: PBBFAC,,, | Performed by: INTERNAL MEDICINE

## 2021-10-04 ENCOUNTER — PATIENT MESSAGE (OUTPATIENT)
Dept: ADMINISTRATIVE | Facility: HOSPITAL | Age: 71
End: 2021-10-04

## 2021-10-06 ENCOUNTER — LAB VISIT (OUTPATIENT)
Dept: LAB | Facility: HOSPITAL | Age: 71
End: 2021-10-06
Attending: INTERNAL MEDICINE
Payer: MEDICARE

## 2021-10-06 DIAGNOSIS — I48.0 PAROXYSMAL ATRIAL FIBRILLATION: ICD-10-CM

## 2021-10-06 DIAGNOSIS — Z79.01 CHRONIC ANTICOAGULATION: ICD-10-CM

## 2021-10-06 LAB
INR PPP: 1.8 (ref 0.8–1.2)
PROTHROMBIN TIME: 18.2 SEC (ref 9–12.5)

## 2021-10-06 PROCEDURE — 36415 COLL VENOUS BLD VENIPUNCTURE: CPT | Performed by: INTERNAL MEDICINE

## 2021-10-06 PROCEDURE — 85610 PROTHROMBIN TIME: CPT | Performed by: INTERNAL MEDICINE

## 2021-10-20 ENCOUNTER — LAB VISIT (OUTPATIENT)
Dept: LAB | Facility: HOSPITAL | Age: 71
End: 2021-10-20
Attending: INTERNAL MEDICINE
Payer: MEDICARE

## 2021-10-20 DIAGNOSIS — Z79.01 CHRONIC ANTICOAGULATION: ICD-10-CM

## 2021-10-20 DIAGNOSIS — I48.0 PAROXYSMAL ATRIAL FIBRILLATION: ICD-10-CM

## 2021-10-20 LAB
INR PPP: 2.4 (ref 0.8–1.2)
PROTHROMBIN TIME: 24.8 SEC (ref 9–12.5)

## 2021-10-20 PROCEDURE — 36415 COLL VENOUS BLD VENIPUNCTURE: CPT | Performed by: INTERNAL MEDICINE

## 2021-10-20 PROCEDURE — 85610 PROTHROMBIN TIME: CPT | Performed by: INTERNAL MEDICINE

## 2021-11-05 DIAGNOSIS — E11.9 TYPE 2 DIABETES MELLITUS WITHOUT COMPLICATION, UNSPECIFIED WHETHER LONG TERM INSULIN USE: ICD-10-CM

## 2021-11-17 ENCOUNTER — LAB VISIT (OUTPATIENT)
Dept: LAB | Facility: HOSPITAL | Age: 71
End: 2021-11-17
Attending: INTERNAL MEDICINE
Payer: MEDICARE

## 2021-11-17 DIAGNOSIS — I48.0 PAROXYSMAL ATRIAL FIBRILLATION: ICD-10-CM

## 2021-11-17 DIAGNOSIS — Z79.01 CHRONIC ANTICOAGULATION: ICD-10-CM

## 2021-11-17 LAB
INR PPP: 2.7 (ref 0.8–1.2)
PROTHROMBIN TIME: 27.2 SEC (ref 9–12.5)

## 2021-11-17 PROCEDURE — 85610 PROTHROMBIN TIME: CPT | Performed by: INTERNAL MEDICINE

## 2021-11-17 PROCEDURE — 36415 COLL VENOUS BLD VENIPUNCTURE: CPT | Performed by: INTERNAL MEDICINE

## 2021-11-29 ENCOUNTER — LAB VISIT (OUTPATIENT)
Dept: LAB | Facility: HOSPITAL | Age: 71
End: 2021-11-29
Attending: INTERNAL MEDICINE
Payer: MEDICARE

## 2021-11-29 DIAGNOSIS — Z79.01 CHRONIC ANTICOAGULATION: ICD-10-CM

## 2021-11-29 DIAGNOSIS — I48.0 PAROXYSMAL ATRIAL FIBRILLATION: ICD-10-CM

## 2021-11-29 LAB
INR PPP: 2.3 (ref 0.8–1.2)
PROTHROMBIN TIME: 23.9 SEC (ref 9–12.5)

## 2021-11-29 PROCEDURE — 36415 COLL VENOUS BLD VENIPUNCTURE: CPT | Mod: PO | Performed by: INTERNAL MEDICINE

## 2021-11-29 PROCEDURE — 85610 PROTHROMBIN TIME: CPT | Performed by: INTERNAL MEDICINE

## 2021-12-01 ENCOUNTER — OFFICE VISIT (OUTPATIENT)
Dept: FAMILY MEDICINE | Facility: CLINIC | Age: 71
End: 2021-12-01
Payer: MEDICARE

## 2021-12-01 VITALS
WEIGHT: 208.56 LBS | DIASTOLIC BLOOD PRESSURE: 52 MMHG | OXYGEN SATURATION: 95 % | HEIGHT: 69 IN | HEART RATE: 69 BPM | SYSTOLIC BLOOD PRESSURE: 100 MMHG | BODY MASS INDEX: 30.89 KG/M2 | TEMPERATURE: 98 F

## 2021-12-01 DIAGNOSIS — E11.65 TYPE 2 DIABETES MELLITUS WITH HYPERGLYCEMIA, WITHOUT LONG-TERM CURRENT USE OF INSULIN: Primary | ICD-10-CM

## 2021-12-01 DIAGNOSIS — J30.1 SEASONAL ALLERGIC RHINITIS DUE TO POLLEN: ICD-10-CM

## 2021-12-01 DIAGNOSIS — Z23 NEEDS FLU SHOT: ICD-10-CM

## 2021-12-01 DIAGNOSIS — I10 ESSENTIAL HYPERTENSION: ICD-10-CM

## 2021-12-01 PROCEDURE — 99999 PR PBB SHADOW E&M-EST. PATIENT-LVL IV: ICD-10-PCS | Mod: PBBFAC,,, | Performed by: FAMILY MEDICINE

## 2021-12-01 PROCEDURE — 99214 OFFICE O/P EST MOD 30 MIN: CPT | Mod: S$GLB,,, | Performed by: FAMILY MEDICINE

## 2021-12-01 PROCEDURE — 1157F PR ADVANCE CARE PLAN OR EQUIV PRESENT IN MEDICAL RECORD: ICD-10-PCS | Mod: CPTII,S$GLB,, | Performed by: FAMILY MEDICINE

## 2021-12-01 PROCEDURE — G0008 FLU VACCINE - QUADRIVALENT - ADJUVANTED: ICD-10-PCS | Mod: S$GLB,,, | Performed by: FAMILY MEDICINE

## 2021-12-01 PROCEDURE — 3066F PR DOCUMENTATION OF TREATMENT FOR NEPHROPATHY: ICD-10-PCS | Mod: CPTII,S$GLB,, | Performed by: FAMILY MEDICINE

## 2021-12-01 PROCEDURE — 4010F ACE/ARB THERAPY RXD/TAKEN: CPT | Mod: CPTII,S$GLB,, | Performed by: FAMILY MEDICINE

## 2021-12-01 PROCEDURE — 1157F ADVNC CARE PLAN IN RCRD: CPT | Mod: CPTII,S$GLB,, | Performed by: FAMILY MEDICINE

## 2021-12-01 PROCEDURE — 3060F PR POS MICROALBUMINURIA RESULT DOCUMENTED/REVIEW: ICD-10-PCS | Mod: CPTII,S$GLB,, | Performed by: FAMILY MEDICINE

## 2021-12-01 PROCEDURE — 90694 FLU VACCINE - QUADRIVALENT - ADJUVANTED: ICD-10-PCS | Mod: S$GLB,,, | Performed by: FAMILY MEDICINE

## 2021-12-01 PROCEDURE — 99214 PR OFFICE/OUTPT VISIT, EST, LEVL IV, 30-39 MIN: ICD-10-PCS | Mod: S$GLB,,, | Performed by: FAMILY MEDICINE

## 2021-12-01 PROCEDURE — 99999 PR PBB SHADOW E&M-EST. PATIENT-LVL IV: CPT | Mod: PBBFAC,,, | Performed by: FAMILY MEDICINE

## 2021-12-01 PROCEDURE — G0008 ADMIN INFLUENZA VIRUS VAC: HCPCS | Mod: S$GLB,,, | Performed by: FAMILY MEDICINE

## 2021-12-01 PROCEDURE — 90694 VACC AIIV4 NO PRSRV 0.5ML IM: CPT | Mod: S$GLB,,, | Performed by: FAMILY MEDICINE

## 2021-12-01 PROCEDURE — 3060F POS MICROALBUMINURIA REV: CPT | Mod: CPTII,S$GLB,, | Performed by: FAMILY MEDICINE

## 2021-12-01 PROCEDURE — 4010F PR ACE/ARB THEARPY RXD/TAKEN: ICD-10-PCS | Mod: CPTII,S$GLB,, | Performed by: FAMILY MEDICINE

## 2021-12-01 PROCEDURE — 3066F NEPHROPATHY DOC TX: CPT | Mod: CPTII,S$GLB,, | Performed by: FAMILY MEDICINE

## 2021-12-01 RX ORDER — LEVOCETIRIZINE DIHYDROCHLORIDE 5 MG/1
5 TABLET, FILM COATED ORAL NIGHTLY
Qty: 30 TABLET | Refills: 0 | Status: SHIPPED | OUTPATIENT
Start: 2021-12-01 | End: 2021-12-01

## 2021-12-01 RX ORDER — LEVOCETIRIZINE DIHYDROCHLORIDE 5 MG/1
5 TABLET, FILM COATED ORAL NIGHTLY
Qty: 30 TABLET | Refills: 0 | Status: SHIPPED | OUTPATIENT
Start: 2021-12-01 | End: 2021-12-01 | Stop reason: SDUPTHER

## 2021-12-01 RX ORDER — PREDNISONE 5 MG/1
5 TABLET ORAL 2 TIMES DAILY
Qty: 6 TABLET | Refills: 0 | Status: SHIPPED | OUTPATIENT
Start: 2021-12-01 | End: 2021-12-01 | Stop reason: SDUPTHER

## 2021-12-01 RX ORDER — PREDNISONE 5 MG/1
5 TABLET ORAL 2 TIMES DAILY
Qty: 6 TABLET | Refills: 0 | Status: SHIPPED | OUTPATIENT
Start: 2021-12-01 | End: 2021-12-04

## 2021-12-17 ENCOUNTER — LAB VISIT (OUTPATIENT)
Dept: LAB | Facility: HOSPITAL | Age: 71
End: 2021-12-17
Attending: INTERNAL MEDICINE
Payer: MEDICARE

## 2021-12-17 DIAGNOSIS — Z79.01 CHRONIC ANTICOAGULATION: ICD-10-CM

## 2021-12-17 DIAGNOSIS — I48.0 PAROXYSMAL ATRIAL FIBRILLATION: ICD-10-CM

## 2021-12-17 LAB
INR PPP: 2.1 (ref 0.8–1.2)
PROTHROMBIN TIME: 21 SEC (ref 9–12.5)

## 2021-12-17 PROCEDURE — 36415 COLL VENOUS BLD VENIPUNCTURE: CPT | Performed by: INTERNAL MEDICINE

## 2021-12-17 PROCEDURE — 85610 PROTHROMBIN TIME: CPT | Performed by: INTERNAL MEDICINE

## 2021-12-27 ENCOUNTER — PATIENT MESSAGE (OUTPATIENT)
Dept: ADMINISTRATIVE | Facility: HOSPITAL | Age: 71
End: 2021-12-27
Payer: MEDICARE

## 2022-01-18 ENCOUNTER — LAB VISIT (OUTPATIENT)
Dept: LAB | Facility: HOSPITAL | Age: 72
End: 2022-01-18
Attending: INTERNAL MEDICINE
Payer: MEDICARE

## 2022-01-18 DIAGNOSIS — Z79.01 CHRONIC ANTICOAGULATION: ICD-10-CM

## 2022-01-18 DIAGNOSIS — I48.0 PAROXYSMAL ATRIAL FIBRILLATION: ICD-10-CM

## 2022-01-18 LAB
INR PPP: 3 (ref 0.8–1.2)
PROTHROMBIN TIME: 29.6 SEC (ref 9–12.5)

## 2022-01-18 PROCEDURE — 85610 PROTHROMBIN TIME: CPT | Performed by: INTERNAL MEDICINE

## 2022-01-18 PROCEDURE — 36415 COLL VENOUS BLD VENIPUNCTURE: CPT | Performed by: INTERNAL MEDICINE

## 2022-01-31 RX ORDER — ESCITALOPRAM OXALATE 10 MG/1
10 TABLET ORAL DAILY
Qty: 90 TABLET | Refills: 4 | Status: SHIPPED | OUTPATIENT
Start: 2022-01-31 | End: 2022-02-08

## 2022-01-31 NOTE — TELEPHONE ENCOUNTER
Care Due:                  Date            Visit Type   Department     Provider  --------------------------------------------------------------------------------                                             [facility]  Last Visit: 12-      None         MARSHALL Hanley                                           [facility]  Next Visit: 06-      None         MARSHALL Hanley                                                            Last  Test          Frequency    Reason                     Performed    Due Date  --------------------------------------------------------------------------------    HBA1C.......  6 months...  metFORMIN................  06- 11-    Powered by [employer] WAKU ???? by [employer]. Reference number: [id number].   1/31/2022 1:35:34 PM CST

## 2022-02-16 ENCOUNTER — LAB VISIT (OUTPATIENT)
Dept: LAB | Facility: HOSPITAL | Age: 72
End: 2022-02-16
Attending: INTERNAL MEDICINE
Payer: MEDICARE

## 2022-02-16 DIAGNOSIS — I48.0 PAROXYSMAL ATRIAL FIBRILLATION: ICD-10-CM

## 2022-02-16 DIAGNOSIS — Z79.01 CHRONIC ANTICOAGULATION: ICD-10-CM

## 2022-02-16 LAB
INR PPP: 3.5 (ref 0.8–1.2)
PROTHROMBIN TIME: 34 SEC (ref 9–12.5)

## 2022-02-16 PROCEDURE — 36415 COLL VENOUS BLD VENIPUNCTURE: CPT | Performed by: INTERNAL MEDICINE

## 2022-02-16 PROCEDURE — 85610 PROTHROMBIN TIME: CPT | Performed by: INTERNAL MEDICINE

## 2022-02-26 DIAGNOSIS — J30.1 SEASONAL ALLERGIC RHINITIS DUE TO POLLEN: ICD-10-CM

## 2022-02-26 NOTE — TELEPHONE ENCOUNTER
No new care gaps identified.  Powered by [employer]. Reference number: [id number].   2/26/2022 3:36:29 AM CST

## 2022-03-02 RX ORDER — LEVOCETIRIZINE DIHYDROCHLORIDE 5 MG/1
TABLET, FILM COATED ORAL
Qty: 90 TABLET | Refills: 3 | Status: SHIPPED | OUTPATIENT
Start: 2022-03-02 | End: 2022-03-04 | Stop reason: SDUPTHER

## 2022-03-02 NOTE — TELEPHONE ENCOUNTER
Refill Authorization Note   Kanu Carrero  is requesting a refill authorization.  Brief Assessment and Rationale for Refill:  Approve     Medication Therapy Plan:       Medication Reconciliation Completed: No   Comments:   --->Care Gap information included below if applicable.   Orders Placed This Encounter    levocetirizine (XYZAL) 5 MG tablet      Requested Prescriptions   Signed Prescriptions Disp Refills    levocetirizine (XYZAL) 5 MG tablet 90 tablet 3     Sig: TAKE 1 TABLET(5 MG) BY MOUTH EVERY EVENING       Ear, Nose, and Throat:  Antihistamines Passed - 2/26/2022  3:36 AM        Passed - Patient is at least 18 years old        Passed - Valid encounter within last 15 months     Recent Visits  Date Type Provider Dept   12/01/21 Office Visit Olvin Hanley MD [facility]   09/13/21 Office Visit Olvin Hanley MD [facility]   06/01/21 Office Visit Olvin Hanley MD [facility]   Showing recent visits within past 720 days and meeting all other requirements  Future Appointments  No visits were found meeting these conditions.  Showing future appointments within next 150 days and meeting all other requirements      Future Appointments              In 2 weeks [facility] Del[facility]illo [facility]    In 3 months [facility] [place] [facility] [place]    In 3 months [facility] [place] [facility] [place]    In 3 months Olvin Hanley MD [facility] [place]                    Appointments  past 12m or future 3m with PCP    Date Provider   Last Visit   12/1/2021 Olvin Hanley MD   Next Visit   6/2/2022 Olvin Hanley MD   ED visits in past 90 days: 0     Note composed:1:33 PM 03/02/2022

## 2022-03-04 DIAGNOSIS — J30.1 SEASONAL ALLERGIC RHINITIS DUE TO POLLEN: ICD-10-CM

## 2022-03-04 RX ORDER — LEVOCETIRIZINE DIHYDROCHLORIDE 5 MG/1
5 TABLET, FILM COATED ORAL NIGHTLY
Qty: 90 TABLET | Refills: 3 | Status: SHIPPED | OUTPATIENT
Start: 2022-03-04 | End: 2022-12-26

## 2022-03-04 NOTE — TELEPHONE ENCOUNTER
Care Due:                  Date            Visit Type   Department     Provider  --------------------------------------------------------------------------------                                EP -                              PRIMARY      [facility] FAMILY  Last Visit: 12-      CARE ([facility])   MARSHALL Hanley                               -                              [facility]  Next Visit: 06-      CARE ([facility])   MARSHALL Hanley                                                            Last  Test          Frequency    Reason                     Performed    Due Date  --------------------------------------------------------------------------------    Cr..........  12 months..  metFORMIN................  06- 05-    Powered by [employer]. Reference number: [id number].   3/04/2022 10:50:04 AM CST

## 2022-03-17 ENCOUNTER — LAB VISIT (OUTPATIENT)
Dept: LAB | Facility: HOSPITAL | Age: 72
End: 2022-03-17
Attending: INTERNAL MEDICINE
Payer: MEDICARE

## 2022-03-17 DIAGNOSIS — Z79.01 CHRONIC ANTICOAGULATION: ICD-10-CM

## 2022-03-17 DIAGNOSIS — I48.0 PAROXYSMAL ATRIAL FIBRILLATION: ICD-10-CM

## 2022-03-17 LAB
INR PPP: 2.7 (ref 0.8–1.2)
PROTHROMBIN TIME: 26.7 SEC (ref 9–12.5)

## 2022-03-17 PROCEDURE — 36415 COLL VENOUS BLD VENIPUNCTURE: CPT | Performed by: INTERNAL MEDICINE

## 2022-03-17 PROCEDURE — 85610 PROTHROMBIN TIME: CPT | Performed by: INTERNAL MEDICINE

## 2022-03-22 ENCOUNTER — OFFICE VISIT (OUTPATIENT)
Dept: CARDIOLOGY | Facility: CLINIC | Age: 72
End: 2022-03-22
Attending: INTERNAL MEDICINE
Payer: MEDICARE

## 2022-03-22 VITALS
DIASTOLIC BLOOD PRESSURE: 65 MMHG | HEART RATE: 84 BPM | SYSTOLIC BLOOD PRESSURE: 130 MMHG | WEIGHT: 214.75 LBS | HEIGHT: 69 IN | BODY MASS INDEX: 31.81 KG/M2 | OXYGEN SATURATION: 97 %

## 2022-03-22 DIAGNOSIS — I44.7 LEFT BUNDLE BRANCH BLOCK: ICD-10-CM

## 2022-03-22 DIAGNOSIS — Z79.01 CHRONIC ANTICOAGULATION: ICD-10-CM

## 2022-03-22 DIAGNOSIS — I10 ESSENTIAL HYPERTENSION: ICD-10-CM

## 2022-03-22 DIAGNOSIS — E11.59 TYPE 2 DIABETES MELLITUS WITH OTHER CIRCULATORY COMPLICATION, WITHOUT LONG-TERM CURRENT USE OF INSULIN: ICD-10-CM

## 2022-03-22 DIAGNOSIS — I35.9 AORTIC VALVE DISEASE: ICD-10-CM

## 2022-03-22 DIAGNOSIS — Z95.5 HISTORY OF CORONARY ARTERY STENT PLACEMENT: ICD-10-CM

## 2022-03-22 DIAGNOSIS — I25.10 CORONARY ARTERY DISEASE INVOLVING NATIVE CORONARY ARTERY OF NATIVE HEART WITHOUT ANGINA PECTORIS: ICD-10-CM

## 2022-03-22 DIAGNOSIS — E78.00 HYPERCHOLESTEROLEMIA: ICD-10-CM

## 2022-03-22 DIAGNOSIS — I10 PRIMARY HYPERTENSION: ICD-10-CM

## 2022-03-22 DIAGNOSIS — I50.22 HEART FAILURE, SYSTOLIC, CHRONIC: ICD-10-CM

## 2022-03-22 DIAGNOSIS — K21.9 GASTROESOPHAGEAL REFLUX DISEASE WITHOUT ESOPHAGITIS: ICD-10-CM

## 2022-03-22 DIAGNOSIS — E66.3 OVERWEIGHT: ICD-10-CM

## 2022-03-22 DIAGNOSIS — Z95.2 HISTORY OF HEART VALVE REPLACEMENT: ICD-10-CM

## 2022-03-22 PROCEDURE — 99214 OFFICE O/P EST MOD 30 MIN: CPT | Mod: S$GLB,,, | Performed by: INTERNAL MEDICINE

## 2022-03-22 PROCEDURE — 99214 PR OFFICE/OUTPT VISIT, EST, LEVL IV, 30-39 MIN: ICD-10-PCS | Mod: S$GLB,,, | Performed by: INTERNAL MEDICINE

## 2022-03-22 PROCEDURE — 99999 PR PBB SHADOW E&M-EST. PATIENT-LVL III: ICD-10-PCS | Mod: PBBFAC,,, | Performed by: INTERNAL MEDICINE

## 2022-03-22 PROCEDURE — 99999 PR PBB SHADOW E&M-EST. PATIENT-LVL III: CPT | Mod: PBBFAC,,, | Performed by: INTERNAL MEDICINE

## 2022-03-22 PROCEDURE — 99499 RISK ADDL DX/OHS AUDIT: ICD-10-PCS | Mod: S$GLB,,, | Performed by: INTERNAL MEDICINE

## 2022-03-22 PROCEDURE — 99499 UNLISTED E&M SERVICE: CPT | Mod: S$GLB,,, | Performed by: INTERNAL MEDICINE

## 2022-03-22 RX ORDER — AMLODIPINE BESYLATE 10 MG/1
10 TABLET ORAL DAILY
Qty: 90 TABLET | Refills: 3 | Status: SHIPPED | OUTPATIENT
Start: 2022-03-22 | End: 2023-03-13 | Stop reason: SDUPTHER

## 2022-03-22 RX ORDER — EZETIMIBE 10 MG/1
10 TABLET ORAL DAILY
Qty: 90 TABLET | Refills: 3 | Status: SHIPPED | OUTPATIENT
Start: 2022-03-22 | End: 2023-03-13 | Stop reason: SDUPTHER

## 2022-03-22 RX ORDER — ROSUVASTATIN CALCIUM 20 MG/1
20 TABLET, COATED ORAL DAILY
Qty: 90 TABLET | Refills: 3 | Status: SHIPPED | OUTPATIENT
Start: 2022-03-22 | End: 2023-03-13 | Stop reason: SDUPTHER

## 2022-03-22 RX ORDER — WARFARIN 3 MG/1
TABLET ORAL
Qty: 140 TABLET | Refills: 3 | Status: SHIPPED | OUTPATIENT
Start: 2022-03-22 | End: 2022-03-23

## 2022-03-22 RX ORDER — CHLORTHALIDONE 25 MG/1
25 TABLET ORAL DAILY
Qty: 90 TABLET | Refills: 3 | Status: SHIPPED | OUTPATIENT
Start: 2022-03-22 | End: 2023-03-13 | Stop reason: SDUPTHER

## 2022-03-22 RX ORDER — RAMIPRIL 10 MG/1
10 CAPSULE ORAL DAILY
Qty: 90 CAPSULE | Refills: 3 | Status: SHIPPED | OUTPATIENT
Start: 2022-03-22 | End: 2023-03-13 | Stop reason: SDUPTHER

## 2022-03-22 NOTE — PROGRESS NOTES
Subjective:     Kanu Carrero is a 72 y.o.male with hypertension, hypercholesterolemia and diabetes mellitus type 2. He is mildly obese. He has coronary artery disease with mild left main disease and a history of an intervention of the right coronary artery in 2008. He has left bundle branch block. He had moderate to severe aortic stenosis. In the fall of 2013 developed mild exertional chest pressure. He underwent cardiac catheterization on 1/30/2014 which revealed an aortic valve area of 1.1 cm2. No obstructive coronary artery disease was seen with an about 40% left main stenosis. There was mild left ventricular dysfunction. He underwent aortic valve replacement receiving a [employer] mechanical valve on 3/11/2014. He was begun on warfarin with anticoagulation managed by Dr. Delgadillo. He was diagnosed with iron deficiency anemia in 10/2016. He underwent an esophagogastroduodenoscopy and colonoscopy that he said were negative. It was decided to discontine aspirin that he was then taking in addition to being anticoagulated. No exertional chest discomfort or exertional dyspnea. No palpitations or weak spells. No bleeding. No issues with any of his prescribed medications. Feeling well overall.      Coronary Artery Disease  Presents for follow-up visit. The disease course has been stable. Pertinent negatives include no chest pain, chest pressure, chest tightness, dizziness, leg swelling, muscle weakness, palpitations, shortness of breath or weight gain. Risk factors include diabetes, hyperlipidemia, hypertension and obesity. Risk factors do not include decreased physical activity. His past medical history is significant for CHF. There is no history of arrhythmia. The symptoms have been stable.   Congestive Heart Failure  Presents for follow-up visit. The disease course has been stable. Pertinent negatives include no abdominal pain, chest pain, chest pressure, claudication, edema, fatigue, muscle weakness,  near-syncope, nocturia, orthopnea, palpitations, paroxysmal nocturnal dyspnea, shortness of breath or unexpected weight change. The symptoms have been stable. His past medical history is significant for CAD, DM and HTN. There is no history of arrhythmia or chronic lung disease.   Hypertension  This is a chronic problem. The current episode started more than 1 year ago. The problem is unchanged. The problem is controlled (usually 115-125/55-60 mmHg at home). Pertinent negatives include no anxiety, blurred vision, chest pain, headaches, malaise/fatigue, neck pain, orthopnea, palpitations, peripheral edema, PND, shortness of breath or sweats. There is no history of chronic renal disease.   Hyperlipidemia  This is a chronic problem. The current episode started more than 1 year ago. The problem is controlled. Recent lipid tests were reviewed and are variable. Exacerbating diseases include diabetes and obesity. He has no history of chronic renal disease, hypothyroidism, liver disease or nephrotic syndrome. Pertinent negatives include no chest pain, focal sensory loss, focal weakness, leg pain, myalgias or shortness of breath.       Review of Systems   Constitutional: Negative for chills, fatigue, fever, malaise/fatigue, unexpected weight change and weight gain.   HENT: Negative for hoarse voice and nosebleeds.    Eyes: Negative for blurred vision, pain, vision loss in left eye and vision loss in right eye.   Cardiovascular: Negative for chest pain, claudication, dyspnea on exertion, irregular heartbeat, leg swelling, near-syncope, orthopnea, palpitations, paroxysmal nocturnal dyspnea and syncope.   Respiratory: Negative for chest tightness, cough, hemoptysis, shortness of breath and wheezing.    Endocrine: Negative for cold intolerance and heat intolerance.   Hematologic/Lymphatic: Negative for bleeding problem. Does not bruise/bleed easily.   Skin: Negative for color change and rash.   Musculoskeletal: Negative for back  pain, falls, gout, muscle weakness, myalgias and neck pain.   Gastrointestinal: Negative for abdominal pain, heartburn, hematemesis, hematochezia, hemorrhoids, jaundice, melena, nausea and vomiting.   Genitourinary: Negative for dysuria, hematuria and nocturia.   Neurological: Negative for dizziness, focal weakness, headaches, light-headedness, loss of balance, numbness, tremors and vertigo.   Psychiatric/Behavioral: Negative for altered mental status, depression and memory loss. The patient is not nervous/anxious.    Allergic/Immunologic: Negative for hives and persistent infections.       Current Outpatient Medications on File Prior to Visit   Medication Sig Dispense Refill    amLODIPine (NORVASC) 10 MG tablet Take 1 tablet (10 mg total) by mouth once daily. 90 tablet 3    chlorthalidone (HYGROTEN) 25 MG Tab Take 1 tablet (25 mg total) by mouth once daily. 90 tablet 3    EScitalopram oxalate (LEXAPRO) 10 MG tablet TAKE 1 TABLET BY MOUTH DAILY 90 tablet 4    ezetimibe (ZETIA) 10 mg tablet Take 1 tablet (10 mg total) by mouth once daily. 90 tablet 3    ferrous sulfate (FEOSOL) 325 mg (65 mg iron) Tab tablet Take 1 tablet (325 mg total) by mouth before breakfast. 90 tablet 3    levocetirizine (XYZAL) 5 MG tablet Take 1 tablet (5 mg total) by mouth every evening. 90 tablet 3    metFORMIN (GLUCOPHAGE) 500 MG tablet TAKE 1 TABLET BY MOUTH TWICE DAILY 180 tablet 3    MULTIVITAMIN W-MINERALS/LUTEIN (CENTRUM SILVER ORAL) Take by mouth.        omeprazole (PRILOSEC) 20 MG capsule Take 20 mg by mouth once daily.   11    ramipriL (ALTACE) 10 MG capsule Take 1 capsule (10 mg total) by mouth once daily. 90 capsule 3    rosuvastatin (CRESTOR) 20 MG tablet Take 1 tablet (20 mg total) by mouth once daily. 90 tablet 3    warfarin (JANTOVEN) 3 MG tablet DOSE TO BE ADJUSTED        ACCORDING TO INTERNATIONAL NORMALIZED RATIO 140 tablet 0    oxycodone-acetaminophen 5-325 mg (PERCOCET) 5-325 mg per tablet Take 1 tablet by  "mouth every 4 (four) hours as needed for Pain. (Patient not taking: No sig reported) 40 tablet 0     No current facility-administered medications on file prior to visit.       /65 Comment: average at home  Pulse 84   Ht 5' 9" (1.753 m)   Wt 97.4 kg (214 lb 11.7 oz)   SpO2 97%   BMI 31.71 kg/m²       Objective:     Physical Exam  Constitutional:       General: He is not in acute distress.     Appearance: Normal appearance. He is well-developed. He is not ill-appearing or diaphoretic.   HENT:      Head: Normocephalic and atraumatic.      Nose: Nose normal.   Eyes:      General:         Right eye: No discharge.         Left eye: No discharge.      Conjunctiva/sclera:      Right eye: Right conjunctiva is not injected.      Left eye: Left conjunctiva is not injected.      Pupils: Pupils are equal.      Right eye: Pupil is round.      Left eye: Pupil is round.   Neck:      Thyroid: No thyroid mass or thyromegaly.      Vascular: No carotid bruit or JVD.   Cardiovascular:      Rate and Rhythm: Regular rhythm. Bradycardia present.  No extrasystoles are present.     Chest Wall: PMI is not displaced.      Pulses:           Radial pulses are 2+ on the right side and 2+ on the left side.        Femoral pulses are 2+ on the right side and 2+ on the left side.       Dorsalis pedis pulses are 2+ on the right side and 2+ on the left side.        Posterior tibial pulses are 2+ on the right side and 2+ on the left side.      Heart sounds: S1 normal. Murmur heard.    Harsh midsystolic murmur is present with a grade of 3/6 at the upper right sternal border. Mechanical S2.     No gallop.      Comments: Mechanical S2.  Pulmonary:      Effort: Pulmonary effort is normal.      Breath sounds: Normal breath sounds.   Abdominal:      Palpations: Abdomen is soft.      Tenderness: There is no abdominal tenderness.   Musculoskeletal:      Cervical back: Neck supple.      Right ankle: Swelling present. No deformity or ecchymosis.      " Left ankle: Swelling present. No deformity or ecchymosis.   Lymphadenopathy:      Head:      Right side of head: No submandibular adenopathy.      Left side of head: No submandibular adenopathy.      Cervical: No cervical adenopathy.   Skin:     General: Skin is warm and dry.   Neurological:      General: No focal deficit present.      Mental Status: He is alert and oriented to person, place, and time. He is not disoriented.      Cranial Nerves: No cranial nerve deficit.   Psychiatric:         Attention and Perception: Attention and perception normal.         Mood and Affect: Mood and affect normal.         Speech: Speech normal.         Behavior: Behavior normal.         Thought Content: Thought content normal.         Cognition and Memory: Cognition and memory normal.         Judgment: Judgment normal.          Assessment:     1. Coronary artery disease involving native coronary artery of native heart without angina pectoris    2. History of coronary artery stent placement    3. Heart failure, systolic, chronic    4. Left bundle branch block    5. Aortic valve disease    6. History of heart valve replacement    7. Chronic anticoagulation    8. Primary hypertension    9. Hypercholesterolemia    10. Type 2 diabetes mellitus with other circulatory complication, without long-term current use of insulin    11. Overweight    12. Gastroesophageal reflux disease without esophagitis        Plan:     1. Coronary Artery Disease   2008: [facility]: Cath: RCA: Stent.   6/8/2010: [facility] [place]: Cath: LM 40%. RCA: Stent patent. EF 40-45%.              No aspirin as he has iron deficiency anemia and is anticoagulated.              Stable.    2. Heart Failure, Systolic and Diastolic, Chronic   2005: Diagnosed. EF 40-45%.   5/22/2013: Echo: EF 50-55%.   5/28/2014: Echo: Normal left ventricular size and function. Mild LVH. Mildly dilated LA. Mechanical aortic valve - 2.4 m/s - mild AR.   9/8/2016: Echo: Normal left ventricular size and systolic  function. Mild LVH. Moderate diastolic dysfunction. LBBB. Moderately dilated LA. Mechanical AVR fine - 2.7 m/s.   5/25/2021: Echo: The left ventricle is mildly dilated with low normal systolic function. EF 50%. The mid anteroseptal wall and apical septum are moderately hypokinetic. The basal inferolateral wall is severely hypokinetic. The remainder contract well. LBBB. Mild diastolic dysfunction. Mildly dilated LA. Mechanical AVR - fine - MG 16 mmHg - DI 0.53.   3/1/2019: Metoprolol was discontinued due to HR of 46 bpm.               On ramipril 10 mg Q24.   Well compensated.   5/2023: Plan next Echo. Then every few years.     3. Left Bundle Branch Block              2005: Diagnosed.   9/8/2017: SB. LBBB.  ms.   10/19/2018: SB 52. LBBB with  msec.   3/1/2019: HR 46 bpm.   7/5/2019: HR 68 bpm.   5/20/2021: HR 67.  ms. LBBB with  ms.              Stable.                4. Aortic Valve Disease              5/22/2013: Echo: Mild AS 3.0 m/s.   Fall 2013: Mild to moderate exertional chest pressure.   1/21/2014: Echo: Normal LV size with low normal LV function. EF 50-55%. LBBB. Moderate AS - 3.2 m/s - 1.2 cm2. Mild to moderate MR.              1/21/2014: Carotid Duplex: Mild plaquing.              [place] [facility] [place] Severe aortic stenosis - 1.1 cm2. Mean gradient 47 mm Hg. Mild CAD. Mild LV dysfunction with EF 50%.              3/11/2014: OB: AVR: [facility] Mechanical Valve - 23 mm.   5/28/2014: Echo: Mechanical aortic valve - 2.4 m/s - mild AR.   5/25/2021: Echo: Mechanical AVR - fine - MG 16 mmHg - DI 0.53.   On warfarin.   Knows about endocarditis prophylaxis.   Stable.    5. Chronic Anticoagulation   3/2014: Began warfarin for mechanical aortic valve. Goal INR 2-3. Anticoagulation managed by Dr. Delgadillo.    2018: Aspirin 81 mg was discontinued due to iron deficiency anemia.   On warfarin.   INR followed.   No aspirin or NSAIDs.    No obvious bleeding.    6. Hypertension               2005: Diagnosed.   3/1/2019: Metoprolol was discontinued due to HR of 46 bpm.    On amlodipine 10 mg Q24, ramipril 10 mg Q24 and chlorthalidone 25 mg Q24.   Leg edema resolved with diuretic.   Keeping log at home.   Well controlled.     7. Hypercholesterolemia   2007: Began statin.              On atorvastatin 80 mg Q24.   12/16/2016: Chol 134. HDL 35. LDL 74. .   May have had some muscle aches while on atorvastatin.    On rosuvastatin 10 mg Q24.   9/13/2017: Chol 158. HDL 33. LDL 96. .   On rosuvastatin 20 mg Q24.   9/14/2018: Chol 153. HDL 35. LDL 92. .   2/20/2019: Chol 151. HDL 35. LDL 86. .   7/9/2020: Chol 169. HDL 40. LDL 95. .   On rosuvastatin 20 mg Q24.   9/16/2020: Ezetimibe 10 mg Q24 was begun.   On rosuvastatin 20 mg Q24 and ezetimibe 10 mg Q24    11/18/2020: Chol 109. HDL 34. LDL 50. .   6/3/2021: Chol 112. HDL 33. LDL 49. .   On rosuvastatin 20 mg Q24 and ezetimibe 10 mg Q24    Very favorable lipid panel.     8. Diabetes Mellitus, Type 2   3/2014: Diagnosed. Complications: CAD. Medications: Oral agent.   On metformin 500 mg Q12.   Tells me well controlled.    9. Mild Obesity   5/5/2017: Weight 97 kg. BMI 32.   9/8/2017: Weight 95 kg. BMI 31.   6/1/2018: Weight 92 kg. BMI 30.   10/19/2018: Weight 89 kg. BMI 29.   3/1/2019: Weight 94 kg. BMI 31.   11/1/2019: Weight 92 kg. BMI 30.   9/16/2020: Weight 96 kg. BMI 31.    Encouraged to lose weight.    10. Gastroesophageal Reflux Disease   2008: Diagnosed.   On omeprazole 20 mg Q24.    11. Anemia   10/2016: Diagnosed with iron deficiency anemia.   10/4/2016: EGD & Colonoscopy: Negative.   Receiving iron.    12. Primary Care              Dr. Olvin Hanley.    F/u 4 months.    Sharad Delgadillo M.D.

## 2022-03-24 DIAGNOSIS — Z95.2 HISTORY OF HEART VALVE REPLACEMENT: ICD-10-CM

## 2022-03-24 DIAGNOSIS — Z79.01 CHRONIC ANTICOAGULATION: ICD-10-CM

## 2022-03-24 RX ORDER — WARFARIN 3 MG/1
TABLET ORAL
Qty: 140 TABLET | Refills: 3 | Status: SHIPPED | OUTPATIENT
Start: 2022-03-24 | End: 2022-05-18

## 2022-03-24 NOTE — TELEPHONE ENCOUNTER
----- Message from Juani Chan sent at 3/24/2022  8:53 AM CDT -----  Contact: Ifrah(Built In)  Name of Caller:Ifrah(Built In)        Pharmacy Name:[facility]        Prescription Name:warfarin (COUMADIN) 3 MG tablet        What do they need to clarify?:Pharmacy calling in regards to seeing if prescription for 140 is a 90 day or 30 day supply         Best Call Back Number:[phone]         Additional Information: reference number [id number]

## 2022-04-25 ENCOUNTER — LAB VISIT (OUTPATIENT)
Dept: LAB | Facility: HOSPITAL | Age: 72
End: 2022-04-25
Attending: INTERNAL MEDICINE
Payer: MEDICARE

## 2022-04-25 DIAGNOSIS — Z79.01 CHRONIC ANTICOAGULATION: ICD-10-CM

## 2022-04-25 DIAGNOSIS — I48.0 PAROXYSMAL ATRIAL FIBRILLATION: ICD-10-CM

## 2022-04-25 LAB
INR PPP: 2.1 (ref 0.8–1.2)
PROTHROMBIN TIME: 20.9 SEC (ref 9–12.5)

## 2022-04-25 PROCEDURE — 85610 PROTHROMBIN TIME: CPT | Performed by: INTERNAL MEDICINE

## 2022-04-25 PROCEDURE — 36415 COLL VENOUS BLD VENIPUNCTURE: CPT | Performed by: INTERNAL MEDICINE

## 2022-05-27 ENCOUNTER — PATIENT MESSAGE (OUTPATIENT)
Dept: ADMINISTRATIVE | Facility: OTHER | Age: 72
End: 2022-05-27
Payer: MEDICARE

## 2022-06-03 ENCOUNTER — LAB VISIT (OUTPATIENT)
Dept: LAB | Facility: HOSPITAL | Age: 72
End: 2022-06-03
Attending: FAMILY MEDICINE
Payer: MEDICARE

## 2022-06-03 DIAGNOSIS — I10 ESSENTIAL HYPERTENSION: ICD-10-CM

## 2022-06-03 DIAGNOSIS — E11.59 TYPE 2 DIABETES MELLITUS WITH OTHER CIRCULATORY COMPLICATION, WITHOUT LONG-TERM CURRENT USE OF INSULIN: ICD-10-CM

## 2022-06-03 LAB
ALBUMIN SERPL BCP-MCNC: 4.3 G/DL (ref 3.5–5.2)
ALP SERPL-CCNC: 41 U/L (ref 55–135)
ALT SERPL W/O P-5'-P-CCNC: 18 U/L (ref 10–44)
ANION GAP SERPL CALC-SCNC: 9 MMOL/L (ref 8–16)
AST SERPL-CCNC: 16 U/L (ref 10–40)
BILIRUB SERPL-MCNC: 0.5 MG/DL (ref 0.1–1)
BUN SERPL-MCNC: 24 MG/DL (ref 8–23)
CALCIUM SERPL-MCNC: 9.7 MG/DL (ref 8.7–10.5)
CHLORIDE SERPL-SCNC: 102 MMOL/L (ref 95–110)
CHOLEST SERPL-MCNC: 113 MG/DL (ref 120–199)
CHOLEST/HDLC SERPL: 2.8 {RATIO} (ref 2–5)
CO2 SERPL-SCNC: 28 MMOL/L (ref 23–29)
CREAT SERPL-MCNC: 0.9 MG/DL (ref 0.5–1.4)
EST. GFR  (AFRICAN AMERICAN): >60 ML/MIN/1.73 M^2
EST. GFR  (NON AFRICAN AMERICAN): >60 ML/MIN/1.73 M^2
ESTIMATED AVG GLUCOSE: 120 MG/DL (ref 68–131)
GLUCOSE SERPL-MCNC: 96 MG/DL (ref 70–110)
HBA1C MFR BLD: 5.8 % (ref 4–5.6)
HDLC SERPL-MCNC: 41 MG/DL (ref 40–75)
HDLC SERPL: 36.3 % (ref 20–50)
LDLC SERPL CALC-MCNC: 47.8 MG/DL (ref 63–159)
NONHDLC SERPL-MCNC: 72 MG/DL
POTASSIUM SERPL-SCNC: 4.1 MMOL/L (ref 3.5–5.1)
PROT SERPL-MCNC: 6.9 G/DL (ref 6–8.4)
SODIUM SERPL-SCNC: 139 MMOL/L (ref 136–145)
TRIGL SERPL-MCNC: 121 MG/DL (ref 30–150)

## 2022-06-03 PROCEDURE — 36415 COLL VENOUS BLD VENIPUNCTURE: CPT | Mod: PO | Performed by: FAMILY MEDICINE

## 2022-06-03 PROCEDURE — 83036 HEMOGLOBIN GLYCOSYLATED A1C: CPT | Performed by: FAMILY MEDICINE

## 2022-06-03 PROCEDURE — 80053 COMPREHEN METABOLIC PANEL: CPT | Performed by: FAMILY MEDICINE

## 2022-06-03 PROCEDURE — 80061 LIPID PANEL: CPT | Performed by: FAMILY MEDICINE

## 2022-06-06 ENCOUNTER — OFFICE VISIT (OUTPATIENT)
Dept: FAMILY MEDICINE | Facility: CLINIC | Age: 72
End: 2022-06-06
Payer: MEDICARE

## 2022-06-06 VITALS
HEART RATE: 68 BPM | DIASTOLIC BLOOD PRESSURE: 68 MMHG | WEIGHT: 206.56 LBS | HEIGHT: 69 IN | BODY MASS INDEX: 30.59 KG/M2 | SYSTOLIC BLOOD PRESSURE: 112 MMHG | OXYGEN SATURATION: 96 %

## 2022-06-06 DIAGNOSIS — F33.0 MILD EPISODE OF RECURRENT MAJOR DEPRESSIVE DISORDER: ICD-10-CM

## 2022-06-06 DIAGNOSIS — D50.8 OTHER IRON DEFICIENCY ANEMIA: ICD-10-CM

## 2022-06-06 DIAGNOSIS — E78.00 HYPERCHOLESTEROLEMIA: ICD-10-CM

## 2022-06-06 DIAGNOSIS — E11.65 TYPE 2 DIABETES MELLITUS WITH HYPERGLYCEMIA, WITHOUT LONG-TERM CURRENT USE OF INSULIN: ICD-10-CM

## 2022-06-06 DIAGNOSIS — I10 PRIMARY HYPERTENSION: ICD-10-CM

## 2022-06-06 DIAGNOSIS — I25.10 CORONARY ARTERY DISEASE INVOLVING NATIVE CORONARY ARTERY OF NATIVE HEART WITHOUT ANGINA PECTORIS: Primary | ICD-10-CM

## 2022-06-06 DIAGNOSIS — K21.9 GASTROESOPHAGEAL REFLUX DISEASE WITHOUT ESOPHAGITIS: ICD-10-CM

## 2022-06-06 PROCEDURE — 99999 PR PBB SHADOW E&M-EST. PATIENT-LVL III: CPT | Mod: PBBFAC,,, | Performed by: FAMILY MEDICINE

## 2022-06-06 PROCEDURE — 1160F RVW MEDS BY RX/DR IN RCRD: CPT | Mod: CPTII,S$GLB,, | Performed by: FAMILY MEDICINE

## 2022-06-06 PROCEDURE — 3074F PR MOST RECENT SYSTOLIC BLOOD PRESSURE < 130 MM HG: ICD-10-PCS | Mod: CPTII,S$GLB,, | Performed by: FAMILY MEDICINE

## 2022-06-06 PROCEDURE — 1159F MED LIST DOCD IN RCRD: CPT | Mod: CPTII,S$GLB,, | Performed by: FAMILY MEDICINE

## 2022-06-06 PROCEDURE — 99999 PR PBB SHADOW E&M-EST. PATIENT-LVL III: ICD-10-PCS | Mod: PBBFAC,,, | Performed by: FAMILY MEDICINE

## 2022-06-06 PROCEDURE — 1159F PR MEDICATION LIST DOCUMENTED IN MEDICAL RECORD: ICD-10-PCS | Mod: CPTII,S$GLB,, | Performed by: FAMILY MEDICINE

## 2022-06-06 PROCEDURE — 3044F PR MOST RECENT HEMOGLOBIN A1C LEVEL <7.0%: ICD-10-PCS | Mod: CPTII,S$GLB,, | Performed by: FAMILY MEDICINE

## 2022-06-06 PROCEDURE — 3008F PR BODY MASS INDEX (BMI) DOCUMENTED: ICD-10-PCS | Mod: CPTII,S$GLB,, | Performed by: FAMILY MEDICINE

## 2022-06-06 PROCEDURE — 3288F FALL RISK ASSESSMENT DOCD: CPT | Mod: CPTII,S$GLB,, | Performed by: FAMILY MEDICINE

## 2022-06-06 PROCEDURE — 4010F PR ACE/ARB THEARPY RXD/TAKEN: ICD-10-PCS | Mod: CPTII,S$GLB,, | Performed by: FAMILY MEDICINE

## 2022-06-06 PROCEDURE — 3074F SYST BP LT 130 MM HG: CPT | Mod: CPTII,S$GLB,, | Performed by: FAMILY MEDICINE

## 2022-06-06 PROCEDURE — 1160F PR REVIEW ALL MEDS BY PRESCRIBER/CLIN PHARMACIST DOCUMENTED: ICD-10-PCS | Mod: CPTII,S$GLB,, | Performed by: FAMILY MEDICINE

## 2022-06-06 PROCEDURE — 1101F PT FALLS ASSESS-DOCD LE1/YR: CPT | Mod: CPTII,S$GLB,, | Performed by: FAMILY MEDICINE

## 2022-06-06 PROCEDURE — 3078F DIAST BP <80 MM HG: CPT | Mod: CPTII,S$GLB,, | Performed by: FAMILY MEDICINE

## 2022-06-06 PROCEDURE — 1157F ADVNC CARE PLAN IN RCRD: CPT | Mod: CPTII,S$GLB,, | Performed by: FAMILY MEDICINE

## 2022-06-06 PROCEDURE — 99214 PR OFFICE/OUTPT VISIT, EST, LEVL IV, 30-39 MIN: ICD-10-PCS | Mod: S$GLB,,, | Performed by: FAMILY MEDICINE

## 2022-06-06 PROCEDURE — 1125F AMNT PAIN NOTED PAIN PRSNT: CPT | Mod: CPTII,S$GLB,, | Performed by: FAMILY MEDICINE

## 2022-06-06 PROCEDURE — 3060F POS MICROALBUMINURIA REV: CPT | Mod: CPTII,S$GLB,, | Performed by: FAMILY MEDICINE

## 2022-06-06 PROCEDURE — 1157F PR ADVANCE CARE PLAN OR EQUIV PRESENT IN MEDICAL RECORD: ICD-10-PCS | Mod: CPTII,S$GLB,, | Performed by: FAMILY MEDICINE

## 2022-06-06 PROCEDURE — 1125F PR PAIN SEVERITY QUANTIFIED, PAIN PRESENT: ICD-10-PCS | Mod: CPTII,S$GLB,, | Performed by: FAMILY MEDICINE

## 2022-06-06 PROCEDURE — 3078F PR MOST RECENT DIASTOLIC BLOOD PRESSURE < 80 MM HG: ICD-10-PCS | Mod: CPTII,S$GLB,, | Performed by: FAMILY MEDICINE

## 2022-06-06 PROCEDURE — 3060F PR POS MICROALBUMINURIA RESULT DOCUMENTED/REVIEW: ICD-10-PCS | Mod: CPTII,S$GLB,, | Performed by: FAMILY MEDICINE

## 2022-06-06 PROCEDURE — 1101F PR PT FALLS ASSESS DOC 0-1 FALLS W/OUT INJ PAST YR: ICD-10-PCS | Mod: CPTII,S$GLB,, | Performed by: FAMILY MEDICINE

## 2022-06-06 PROCEDURE — 99214 OFFICE O/P EST MOD 30 MIN: CPT | Mod: S$GLB,,, | Performed by: FAMILY MEDICINE

## 2022-06-06 PROCEDURE — 3008F BODY MASS INDEX DOCD: CPT | Mod: CPTII,S$GLB,, | Performed by: FAMILY MEDICINE

## 2022-06-06 PROCEDURE — 3288F PR FALLS RISK ASSESSMENT DOCUMENTED: ICD-10-PCS | Mod: CPTII,S$GLB,, | Performed by: FAMILY MEDICINE

## 2022-06-06 PROCEDURE — 3066F PR DOCUMENTATION OF TREATMENT FOR NEPHROPATHY: ICD-10-PCS | Mod: CPTII,S$GLB,, | Performed by: FAMILY MEDICINE

## 2022-06-06 PROCEDURE — 3044F HG A1C LEVEL LT 7.0%: CPT | Mod: CPTII,S$GLB,, | Performed by: FAMILY MEDICINE

## 2022-06-06 PROCEDURE — 4010F ACE/ARB THERAPY RXD/TAKEN: CPT | Mod: CPTII,S$GLB,, | Performed by: FAMILY MEDICINE

## 2022-06-06 PROCEDURE — 3066F NEPHROPATHY DOC TX: CPT | Mod: CPTII,S$GLB,, | Performed by: FAMILY MEDICINE

## 2022-06-06 RX ORDER — METFORMIN HYDROCHLORIDE 500 MG/1
500 TABLET ORAL 2 TIMES DAILY
Qty: 180 TABLET | Refills: 3 | Status: SHIPPED | OUTPATIENT
Start: 2022-06-06 | End: 2022-12-06

## 2022-06-06 RX ORDER — FERROUS SULFATE 325(65) MG
325 TABLET ORAL
Qty: 90 TABLET | Refills: 3 | Status: SHIPPED | OUTPATIENT
Start: 2022-06-06 | End: 2023-06-06 | Stop reason: SDUPTHER

## 2022-06-06 NOTE — PROGRESS NOTES
Subjective:       Patient ID: Kanu Carrero is a 72 y.o. male.    Chief Complaint: Follow-up, Diabetes, and Hypertension    72 years old male came to the clinic for blood pressure check.  No chest pain, palpitation, orthopnea or PND.  Last A1c was stable.  No polyuria, polydipsia or polyphagia.  Depression currently control.  No suicidal or homicidal ideations.  Patient is requesting refill of iron supplementation.    Review of Systems   Constitutional: Negative.    HENT: Negative.    Eyes: Negative.    Respiratory: Negative.    Cardiovascular: Negative.  Negative for chest pain, palpitations, leg swelling and claudication.   Gastrointestinal: Negative.    Endocrine: Negative for polydipsia, polyphagia and polyuria.   Genitourinary: Negative.    Musculoskeletal: Negative.    Integumentary:  Negative.   Neurological: Negative.    Psychiatric/Behavioral: Negative.          Objective:      Physical Exam  Vitals and nursing note reviewed.   Constitutional:       General: He is not in acute distress.     Appearance: He is well-developed. He is not diaphoretic.   HENT:      Head: Normocephalic and atraumatic.      Right Ear: External ear normal.      Left Ear: External ear normal.      Nose: Nose normal.      Mouth/Throat:      Pharynx: No oropharyngeal exudate.   Eyes:      General: No scleral icterus.        Right eye: No discharge.         Left eye: No discharge.      Conjunctiva/sclera: Conjunctivae normal.      Pupils: Pupils are equal, round, and reactive to light.   Neck:      Thyroid: No thyromegaly.      Vascular: No JVD.      Trachea: No tracheal deviation.   Cardiovascular:      Rate and Rhythm: Normal rate and regular rhythm.      Heart sounds: Normal heart sounds. No murmur heard.    No friction rub. No gallop.   Pulmonary:      Effort: Pulmonary effort is normal. No respiratory distress.      Breath sounds: Normal breath sounds. No stridor. No wheezing or rales.   Chest:      Chest wall: No tenderness.    Abdominal:      General: Bowel sounds are normal. There is no distension.      Palpations: Abdomen is soft. There is no mass.      Tenderness: There is no abdominal tenderness. There is no guarding or rebound.   Musculoskeletal:         General: No tenderness. Normal range of motion.      Cervical back: Normal range of motion and neck supple.   Lymphadenopathy:      Cervical: No cervical adenopathy.   Skin:     General: Skin is warm and dry.      Coloration: Skin is not pale.      Findings: No erythema or rash.   Neurological:      Mental Status: He is alert and oriented to person, place, and time.      Cranial Nerves: No cranial nerve deficit.      Motor: No abnormal muscle tone.      Coordination: Coordination normal.      Deep Tendon Reflexes: Reflexes are normal and symmetric. Reflexes normal.   Psychiatric:         Behavior: Behavior normal.         Thought Content: Thought content normal.         Judgment: Judgment normal.         Assessment:       Problem List Items Addressed This Visit     Coronary artery disease - Primary    Hypercholesterolemia    Relevant Orders    Lipid Panel    Hypertension    Relevant Orders    CBC Auto Differential    Comprehensive Metabolic Panel    Gastroesophageal reflux disease    Type 2 diabetes mellitus with hyperglycemia, without long-term current use of insulin    Relevant Medications    metFORMIN (GLUCOPHAGE) 500 MG tablet    Other Relevant Orders    Hemoglobin A1C    Microalbumin/Creatinine Ratio, Urine    Mild episode of recurrent major depressive disorder    Iron deficiency anemia    Relevant Medications    ferrous sulfate (FEOSOL) 325 mg (65 mg iron) Tab tablet          Plan:         Kanu was seen today for follow-up, diabetes and hypertension.    Diagnoses and all orders for this visit:    Coronary artery disease involving native coronary artery of native heart without angina pectoris    Hypercholesterolemia  -     Lipid Panel; Future    Primary hypertension  -      CBC Auto Differential; Future  -     Comprehensive Metabolic Panel; Future    Type 2 diabetes mellitus with hyperglycemia, without long-term current use of insulin  -     metFORMIN (GLUCOPHAGE) 500 MG tablet; Take 1 tablet (500 mg total) by mouth 2 (two) times daily.  -     Hemoglobin A1C; Future  -     Microalbumin/Creatinine Ratio, Urine; Future    Gastroesophageal reflux disease without esophagitis    Mild episode of recurrent major depressive disorder    Other iron deficiency anemia  -     ferrous sulfate (FEOSOL) 325 mg (65 mg iron) Tab tablet; Take 1 tablet (325 mg total) by mouth daily with breakfast.    Continue monitoring blood sugar at home,ADA diet.    Continue monitoring blood pressure at home, low sodium diet.

## 2022-06-23 ENCOUNTER — LAB VISIT (OUTPATIENT)
Dept: LAB | Facility: HOSPITAL | Age: 72
End: 2022-06-23
Attending: INTERNAL MEDICINE
Payer: MEDICARE

## 2022-06-23 DIAGNOSIS — I48.0 PAROXYSMAL ATRIAL FIBRILLATION: ICD-10-CM

## 2022-06-23 DIAGNOSIS — Z79.01 CHRONIC ANTICOAGULATION: ICD-10-CM

## 2022-06-23 LAB
INR PPP: 1.4 (ref 0.8–1.2)
PROTHROMBIN TIME: 14.6 SEC (ref 9–12.5)

## 2022-06-23 PROCEDURE — 85610 PROTHROMBIN TIME: CPT | Performed by: INTERNAL MEDICINE

## 2022-06-23 PROCEDURE — 36415 COLL VENOUS BLD VENIPUNCTURE: CPT | Performed by: INTERNAL MEDICINE

## 2022-06-30 ENCOUNTER — LAB VISIT (OUTPATIENT)
Dept: LAB | Facility: HOSPITAL | Age: 72
End: 2022-06-30
Attending: INTERNAL MEDICINE
Payer: OTHER MISCELLANEOUS

## 2022-06-30 DIAGNOSIS — Z79.01 CHRONIC ANTICOAGULATION: ICD-10-CM

## 2022-06-30 DIAGNOSIS — I48.0 PAROXYSMAL ATRIAL FIBRILLATION: ICD-10-CM

## 2022-06-30 LAB
INR PPP: 3.5 (ref 0.8–1.2)
PROTHROMBIN TIME: 34 SEC (ref 9–12.5)

## 2022-06-30 PROCEDURE — 85610 PROTHROMBIN TIME: CPT | Performed by: INTERNAL MEDICINE

## 2022-06-30 PROCEDURE — 36415 COLL VENOUS BLD VENIPUNCTURE: CPT | Performed by: INTERNAL MEDICINE

## 2022-07-01 ENCOUNTER — OFFICE VISIT (OUTPATIENT)
Dept: CARDIOLOGY | Facility: CLINIC | Age: 72
End: 2022-07-01
Attending: INTERNAL MEDICINE
Payer: OTHER MISCELLANEOUS

## 2022-07-01 VITALS
HEIGHT: 69 IN | SYSTOLIC BLOOD PRESSURE: 120 MMHG | OXYGEN SATURATION: 100 % | BODY MASS INDEX: 29.06 KG/M2 | HEART RATE: 66 BPM | DIASTOLIC BLOOD PRESSURE: 68 MMHG | WEIGHT: 196.19 LBS

## 2022-07-01 DIAGNOSIS — E11.59 TYPE 2 DIABETES MELLITUS WITH OTHER CIRCULATORY COMPLICATION, WITHOUT LONG-TERM CURRENT USE OF INSULIN: ICD-10-CM

## 2022-07-01 DIAGNOSIS — E66.3 OVERWEIGHT: ICD-10-CM

## 2022-07-01 DIAGNOSIS — I10 PRIMARY HYPERTENSION: ICD-10-CM

## 2022-07-01 DIAGNOSIS — I35.9 AORTIC VALVE DISEASE: ICD-10-CM

## 2022-07-01 DIAGNOSIS — I44.7 LEFT BUNDLE BRANCH BLOCK: ICD-10-CM

## 2022-07-01 DIAGNOSIS — I25.10 CORONARY ARTERY DISEASE INVOLVING NATIVE CORONARY ARTERY OF NATIVE HEART WITHOUT ANGINA PECTORIS: ICD-10-CM

## 2022-07-01 DIAGNOSIS — K21.9 GASTROESOPHAGEAL REFLUX DISEASE WITHOUT ESOPHAGITIS: ICD-10-CM

## 2022-07-01 DIAGNOSIS — Z95.2 HISTORY OF HEART VALVE REPLACEMENT: ICD-10-CM

## 2022-07-01 DIAGNOSIS — I50.22 HEART FAILURE, SYSTOLIC, CHRONIC: ICD-10-CM

## 2022-07-01 DIAGNOSIS — Z79.01 CHRONIC ANTICOAGULATION: ICD-10-CM

## 2022-07-01 DIAGNOSIS — Z95.5 HISTORY OF CORONARY ARTERY STENT PLACEMENT: ICD-10-CM

## 2022-07-01 DIAGNOSIS — E78.00 HYPERCHOLESTEROLEMIA: ICD-10-CM

## 2022-07-01 PROCEDURE — 93010 ELECTROCARDIOGRAM REPORT: CPT | Mod: S$GLB,,, | Performed by: INTERNAL MEDICINE

## 2022-07-01 PROCEDURE — 93000 ELECTROCARDIOGRAM COMPLETE: CPT | Mod: S$GLB,,, | Performed by: INTERNAL MEDICINE

## 2022-07-01 PROCEDURE — 99499 RISK ADDL DX/OHS AUDIT: ICD-10-PCS | Mod: S$GLB,,, | Performed by: INTERNAL MEDICINE

## 2022-07-01 PROCEDURE — 93005 ELECTROCARDIOGRAM TRACING: CPT

## 2022-07-01 PROCEDURE — 93000 PR ELECTROCARDIOGRAM, COMPLETE: ICD-10-PCS | Mod: S$GLB,,, | Performed by: INTERNAL MEDICINE

## 2022-07-01 PROCEDURE — 99214 PR OFFICE/OUTPT VISIT, EST, LEVL IV, 30-39 MIN: ICD-10-PCS | Mod: 25,S$GLB,, | Performed by: INTERNAL MEDICINE

## 2022-07-01 PROCEDURE — 93010 EKG 12-LEAD: ICD-10-PCS | Mod: S$GLB,,, | Performed by: INTERNAL MEDICINE

## 2022-07-01 PROCEDURE — 99999 PR PBB SHADOW E&M-EST. PATIENT-LVL III: ICD-10-PCS | Mod: PBBFAC,,, | Performed by: INTERNAL MEDICINE

## 2022-07-01 PROCEDURE — 99214 OFFICE O/P EST MOD 30 MIN: CPT | Mod: 25,S$GLB,, | Performed by: INTERNAL MEDICINE

## 2022-07-01 PROCEDURE — 99499 UNLISTED E&M SERVICE: CPT | Mod: S$GLB,,, | Performed by: INTERNAL MEDICINE

## 2022-07-01 PROCEDURE — 99999 PR PBB SHADOW E&M-EST. PATIENT-LVL III: CPT | Mod: PBBFAC,,, | Performed by: INTERNAL MEDICINE

## 2022-07-01 NOTE — PROGRESS NOTES
Subjective:     Kanu Carrero is a 72 y.o.male with hypertension, hypercholesterolemia and diabetes mellitus type 2. He is mildly obese. He has coronary artery disease with mild left main disease and a history of an intervention of the right coronary artery in 2008. He has left bundle branch block. He had moderate to severe aortic stenosis. In the fall of 2013 developed mild exertional chest pressure. He underwent cardiac catheterization on 1/30/2014 which revealed an aortic valve area of 1.1 cm2. No obstructive coronary artery disease was seen with an about 40% left main stenosis. There was mild left ventricular dysfunction. He underwent aortic valve replacement receiving a [employer] mechanical valve on 3/11/2014. He was begun on warfarin with anticoagulation managed by Dr. Delgadillo. He was diagnosed with iron deficiency anemia in 10/2016. He underwent an esophagogastroduodenoscopy and colonoscopy that he said were negative. It was decided to discontine aspirin that he was then taking in addition to being anticoagulated. No exertional chest discomfort or exertional dyspnea. No palpitations or weak spells. No bleeding. No issues with any of his prescribed medications. Feeling well overall.      Coronary Artery Disease  Presents for follow-up visit. The disease course has been stable. Pertinent negatives include no chest pain, chest pressure, chest tightness, dizziness, leg swelling, muscle weakness, palpitations, shortness of breath or weight gain. Risk factors include diabetes, hyperlipidemia, hypertension and obesity. Risk factors do not include decreased physical activity. His past medical history is significant for CHF. There is no history of arrhythmia. The symptoms have been stable.   Congestive Heart Failure  Presents for follow-up visit. The disease course has been stable. Pertinent negatives include no abdominal pain, chest pain, chest pressure, claudication, edema, fatigue, muscle weakness,  near-syncope, nocturia, orthopnea, palpitations, paroxysmal nocturnal dyspnea, shortness of breath or unexpected weight change. The symptoms have been stable. His past medical history is significant for CAD, DM and HTN. There is no history of arrhythmia or chronic lung disease.   Hypertension  This is a chronic problem. The current episode started more than 1 year ago. The problem is unchanged. The problem is controlled (usually 115-125/55-60 mmHg at home). Pertinent negatives include no anxiety, blurred vision, chest pain, headaches, malaise/fatigue, neck pain, orthopnea, palpitations, peripheral edema, PND, shortness of breath or sweats. There is no history of chronic renal disease.   Hyperlipidemia  This is a chronic problem. The current episode started more than 1 year ago. The problem is controlled. Recent lipid tests were reviewed and are variable. Exacerbating diseases include diabetes and obesity. He has no history of chronic renal disease, hypothyroidism, liver disease or nephrotic syndrome. Pertinent negatives include no chest pain, focal sensory loss, focal weakness, leg pain, myalgias or shortness of breath.       Review of Systems   Constitutional: Negative for chills, fatigue, fever, malaise/fatigue, unexpected weight change and weight gain.   HENT: Negative for hoarse voice and nosebleeds.    Eyes: Negative for blurred vision, pain, vision loss in left eye and vision loss in right eye.   Cardiovascular: Negative for chest pain, claudication, dyspnea on exertion, irregular heartbeat, leg swelling, near-syncope, orthopnea, palpitations, paroxysmal nocturnal dyspnea and syncope.   Respiratory: Negative for chest tightness, cough, hemoptysis, shortness of breath and wheezing.    Endocrine: Negative for cold intolerance and heat intolerance.   Hematologic/Lymphatic: Negative for bleeding problem. Does not bruise/bleed easily.   Skin: Negative for color change and rash.   Musculoskeletal: Negative for back  pain, falls, gout, muscle weakness, myalgias and neck pain.   Gastrointestinal: Negative for abdominal pain, heartburn, hematemesis, hematochezia, hemorrhoids, jaundice, melena, nausea and vomiting.   Genitourinary: Negative for dysuria, hematuria and nocturia.   Neurological: Negative for dizziness, focal weakness, headaches, light-headedness, loss of balance, numbness, tremors and vertigo.   Psychiatric/Behavioral: Negative for altered mental status, depression and memory loss. The patient is not nervous/anxious.    Allergic/Immunologic: Negative for hives and persistent infections.       Current Outpatient Medications on File Prior to Visit   Medication Sig Dispense Refill    amLODIPine (NORVASC) 10 MG tablet Take 1 tablet (10 mg total) by mouth once daily. 90 tablet 3    chlorthalidone (HYGROTEN) 25 MG Tab Take 1 tablet (25 mg total) by mouth once daily. 90 tablet 3    EScitalopram oxalate (LEXAPRO) 10 MG tablet TAKE 1 TABLET BY MOUTH DAILY 90 tablet 4    ezetimibe (ZETIA) 10 mg tablet Take 1 tablet (10 mg total) by mouth once daily. 90 tablet 3    ferrous sulfate (FEOSOL) 325 mg (65 mg iron) Tab tablet Take 1 tablet (325 mg total) by mouth daily with breakfast. 90 tablet 3    metFORMIN (GLUCOPHAGE) 500 MG tablet Take 1 tablet (500 mg total) by mouth 2 (two) times daily. 180 tablet 3    MULTIVITAMIN W-MINERALS/LUTEIN (CENTRUM SILVER ORAL) Take by mouth.        omeprazole (PRILOSEC) 20 MG capsule Take 20 mg by mouth once daily.   11    ramipriL (ALTACE) 10 MG capsule Take 1 capsule (10 mg total) by mouth once daily. 90 capsule 3    rosuvastatin (CRESTOR) 20 MG tablet Take 1 tablet (20 mg total) by mouth once daily. 90 tablet 3    warfarin (JANTOVEN) 3 MG tablet DOSE TO BE ADJUSTED        ACCORDING TO INTERNATIONAL NORMALIZED RATIO 140 tablet 0    levocetirizine (XYZAL) 5 MG tablet Take 1 tablet (5 mg total) by mouth every evening. 90 tablet 3     No current facility-administered medications on file  "prior to visit.       /68 (BP Location: Right arm, Patient Position: Sitting)   Pulse 66   Ht 5' 9" (1.753 m)   Wt 89 kg (196 lb 3.4 oz)   SpO2 100%   BMI 28.98 kg/m²       Objective:     Physical Exam  Constitutional:       General: He is not in acute distress.     Appearance: Normal appearance. He is well-developed. He is not ill-appearing or diaphoretic.   HENT:      Head: Normocephalic and atraumatic.      Nose: Nose normal.   Eyes:      General:         Right eye: No discharge.         Left eye: No discharge.      Conjunctiva/sclera:      Right eye: Right conjunctiva is not injected.      Left eye: Left conjunctiva is not injected.      Pupils: Pupils are equal.      Right eye: Pupil is round.      Left eye: Pupil is round.   Neck:      Thyroid: No thyroid mass or thyromegaly.      Vascular: No carotid bruit or JVD.   Cardiovascular:      Rate and Rhythm: Regular rhythm. Bradycardia present.  No extrasystoles are present.     Chest Wall: PMI is not displaced.      Pulses:           Radial pulses are 2+ on the right side and 2+ on the left side.        Femoral pulses are 2+ on the right side and 2+ on the left side.       Dorsalis pedis pulses are 2+ on the right side and 2+ on the left side.        Posterior tibial pulses are 2+ on the right side and 2+ on the left side.      Heart sounds: S1 normal. Murmur heard.    Harsh midsystolic murmur is present with a grade of 3/6 at the upper right sternal border. Mechanical S2.     No gallop.      Comments: Mechanical S2.  Pulmonary:      Effort: Pulmonary effort is normal.      Breath sounds: Normal breath sounds.   Abdominal:      Palpations: Abdomen is soft.      Tenderness: There is no abdominal tenderness.   Musculoskeletal:      Cervical back: Neck supple.      Right ankle: Swelling present. No deformity or ecchymosis.      Left ankle: Swelling present. No deformity or ecchymosis.   Lymphadenopathy:      Head:      Right side of head: No submandibular " adenopathy.      Left side of head: No submandibular adenopathy.      Cervical: No cervical adenopathy.   Skin:     General: Skin is warm and dry.   Neurological:      General: No focal deficit present.      Mental Status: He is alert and oriented to person, place, and time. He is not disoriented.      Cranial Nerves: No cranial nerve deficit.   Psychiatric:         Attention and Perception: Attention and perception normal.         Mood and Affect: Mood and affect normal.         Speech: Speech normal.         Behavior: Behavior normal.         Thought Content: Thought content normal.         Cognition and Memory: Cognition and memory normal.         Judgment: Judgment normal.          Assessment:     1. Aortic valve disease    2. History of heart valve replacement    3. Chronic anticoagulation    4. Coronary artery disease involving native coronary artery of native heart without angina pectoris    5. History of coronary artery stent placement    6. Heart failure, systolic, chronic    7. Left bundle branch block    8. Primary hypertension    9. Hypercholesterolemia    10. Type 2 diabetes mellitus with other circulatory complication, without long-term current use of insulin    11. Overweight    12. Gastroesophageal reflux disease without esophagitis        Plan:     1. Aortic Valve Disease              5/22/2013: Echo: Mild AS 3.0 m/s.   Fall 2013: Mild to moderate exertional chest pressure.   1/21/2014: Echo: Normal LV size with low normal LV function. EF 50-55%. LBBB. Moderate AS - 3.2 m/s - 1.2 cm2. Mild to moderate MR.              1/21/2014: Carotid Duplex: Mild plaquing.              [place] [facility] [place]: Severe aortic stenosis - 1.1 cm2. Mean gradient 47 mm Hg. Mild CAD. Mild LV dysfunction with EF 50%.              3/11/2014: OB: AVR: [employer] Mechanical Valve - 23 mm.   5/28/2014: Echo: Mechanical aortic valve - 2.4 m/s - mild AR.   5/25/2021: Echo: Mechanical AVR - fine - MG 16 mmHg - DI 0.53.   On  warfarin.   Knows about endocarditis prophylaxis.   Stable.    2. Chronic Anticoagulation   3/2014: Began warfarin for mechanical aortic valve. Goal INR 2-3. Anticoagulation managed by Dr. Delgadillo.    2018: Aspirin 81 mg was discontinued due to iron deficiency anemia.   On warfarin.   INR followed.   No aspirin or NSAIDs.    No obvious bleeding.    3. Coronary Artery Disease   2008: [facility]: Cath: RCA: Stent.   6/8/2010: [facility] [place]: Cath: LM 40%. RCA: Stent patent. EF 40-45%.              No aspirin as he has iron deficiency anemia and is anticoagulated.              Stable.    4. Heart Failure, Systolic and Diastolic, Chronic   2005: Diagnosed. EF 40-45%.   5/22/2013: Echo: EF 50-55%.   5/28/2014: Echo: Normal left ventricular size and function. Mild LVH. Mildly dilated LA. Mechanical aortic valve - 2.4 m/s - mild AR.   9/8/2016: Echo: Normal left ventricular size and systolic function. Mild LVH. Moderate diastolic dysfunction. LBBB. Moderately dilated LA. Mechanical AVR fine - 2.7 m/s.   5/25/2021: Echo: The left ventricle is mildly dilated with low normal systolic function. EF 50%. The mid anteroseptal wall and apical septum are moderately hypokinetic. The basal inferolateral wall is severely hypokinetic. The remainder contract well. LBBB. Mild diastolic dysfunction. Mildly dilated LA. Mechanical AVR - fine - MG 16 mmHg - DI 0.53.   3/1/2019: Metoprolol was discontinued due to HR of 46 bpm.               On ramipril 10 mg Q24.   Well compensated.   5/2023: Plan next Echo. Then every few years.     5. Left Bundle Branch Block              2005: Diagnosed.   9/8/2017: SB. LBBB.  ms.   10/19/2018: SB 52. LBBB with  msec.   3/1/2019: HR 46 bpm.   7/5/2019: HR 68 bpm.   5/20/2021: HR 67.  ms. LBBB with  ms.   7/1/2022: ECG: LBBB.  ms.              Stable.                6. Hypertension              2005: Diagnosed.   3/1/2019: Metoprolol was discontinued due to HR of 46 bpm.    On  amlodipine 10 mg Q24, ramipril 10 mg Q24 and chlorthalidone 25 mg Q24.   Leg edema resolved with diuretic.   Keeping log at home.   Well controlled.     7. Hypercholesterolemia   2007: Began statin.              On atorvastatin 80 mg Q24.   12/16/2016: Chol 134. HDL 35. LDL 74. .   May have had some muscle aches while on atorvastatin.    On rosuvastatin 10 mg Q24.   9/13/2017: Chol 158. HDL 33. LDL 96. .   On rosuvastatin 20 mg Q24.   9/14/2018: Chol 153. HDL 35. LDL 92. .   2/20/2019: Chol 151. HDL 35. LDL 86. .   7/9/2020: Chol 169. HDL 40. LDL 95. .   On rosuvastatin 20 mg Q24.   9/16/2020: Ezetimibe 10 mg Q24 was begun.   On rosuvastatin 20 mg Q24 and ezetimibe 10 mg Q24    11/18/2020: Chol 109. HDL 34. LDL 50. .   6/3/2021: Chol 112. HDL 33. LDL 49. .   6/3/2022: Chol 113. HDL 41. LDL 48. .   On rosuvastatin 20 mg Q24 and ezetimibe 10 mg Q24    Very favorable lipid panel.     8. Diabetes Mellitus, Type 2   3/2014: Diagnosed. Complications: CAD. Medications: Oral agent.   On metformin 500 mg Q12.   6/3/2022: HgbA1C 5.8%.   Well controlled.    9. Mild Obesity   5/5/2017: Weight 97 kg. BMI 32.   9/8/2017: Weight 95 kg. BMI 31.   6/1/2018: Weight 92 kg. BMI 30.   10/19/2018: Weight 89 kg. BMI 29.   3/1/2019: Weight 94 kg. BMI 31.   11/1/2019: Weight 92 kg. BMI 30.   9/16/2020: Weight 96 kg. BMI 31.    Encouraged to lose weight.    10. Gastroesophageal Reflux Disease   2008: Diagnosed.   On omeprazole 20 mg Q24.    11. Anemia   10/2016: Diagnosed with iron deficiency anemia.   10/4/2016: EGD & Colonoscopy: Negative.   Receiving iron.    12. Primary Care              Dr. Olvin Hanley.    F/u 4 months.    Sharad Delgadillo M.D.

## 2022-07-14 ENCOUNTER — LAB VISIT (OUTPATIENT)
Dept: LAB | Facility: HOSPITAL | Age: 72
End: 2022-07-14
Attending: INTERNAL MEDICINE
Payer: MEDICARE

## 2022-07-14 DIAGNOSIS — Z79.01 CHRONIC ANTICOAGULATION: ICD-10-CM

## 2022-07-14 DIAGNOSIS — I35.9 AORTIC VALVE DISEASE: ICD-10-CM

## 2022-07-14 DIAGNOSIS — Z95.2 HISTORY OF HEART VALVE REPLACEMENT: ICD-10-CM

## 2022-07-14 LAB
INR PPP: 2 (ref 0.8–1.2)
PROTHROMBIN TIME: 20.2 SEC (ref 9–12.5)

## 2022-07-14 PROCEDURE — 36415 COLL VENOUS BLD VENIPUNCTURE: CPT | Performed by: INTERNAL MEDICINE

## 2022-07-14 PROCEDURE — 85610 PROTHROMBIN TIME: CPT | Performed by: INTERNAL MEDICINE

## 2022-07-22 ENCOUNTER — PES CALL (OUTPATIENT)
Dept: ADMINISTRATIVE | Facility: OTHER | Age: 72
End: 2022-07-22
Payer: MEDICARE

## 2022-07-28 ENCOUNTER — LAB VISIT (OUTPATIENT)
Dept: LAB | Facility: HOSPITAL | Age: 72
End: 2022-07-28
Attending: INTERNAL MEDICINE
Payer: OTHER MISCELLANEOUS

## 2022-07-28 DIAGNOSIS — I35.9 AORTIC VALVE DISEASE: ICD-10-CM

## 2022-07-28 DIAGNOSIS — Z95.2 HISTORY OF HEART VALVE REPLACEMENT: ICD-10-CM

## 2022-07-28 DIAGNOSIS — Z79.01 CHRONIC ANTICOAGULATION: ICD-10-CM

## 2022-07-28 LAB
INR PPP: 3.1 (ref 0.8–1.2)
PROTHROMBIN TIME: 29.9 SEC (ref 9–12.5)

## 2022-07-28 PROCEDURE — 36415 COLL VENOUS BLD VENIPUNCTURE: CPT | Performed by: INTERNAL MEDICINE

## 2022-07-28 PROCEDURE — 85610 PROTHROMBIN TIME: CPT | Performed by: INTERNAL MEDICINE

## 2022-08-11 ENCOUNTER — LAB VISIT (OUTPATIENT)
Dept: LAB | Facility: HOSPITAL | Age: 72
End: 2022-08-11
Attending: INTERNAL MEDICINE
Payer: MEDICARE

## 2022-08-11 DIAGNOSIS — Z95.2 HISTORY OF HEART VALVE REPLACEMENT: ICD-10-CM

## 2022-08-11 DIAGNOSIS — I35.9 AORTIC VALVE DISEASE: ICD-10-CM

## 2022-08-11 DIAGNOSIS — Z79.01 CHRONIC ANTICOAGULATION: ICD-10-CM

## 2022-08-11 LAB
INR PPP: 3.4 (ref 0.8–1.2)
PROTHROMBIN TIME: 32.5 SEC (ref 9–12.5)

## 2022-08-11 PROCEDURE — 36415 COLL VENOUS BLD VENIPUNCTURE: CPT | Performed by: INTERNAL MEDICINE

## 2022-08-11 PROCEDURE — 85610 PROTHROMBIN TIME: CPT | Performed by: INTERNAL MEDICINE

## 2022-08-25 ENCOUNTER — LAB VISIT (OUTPATIENT)
Dept: LAB | Facility: HOSPITAL | Age: 72
End: 2022-08-25
Attending: INTERNAL MEDICINE
Payer: MEDICARE

## 2022-08-25 DIAGNOSIS — Z95.2 HISTORY OF HEART VALVE REPLACEMENT: ICD-10-CM

## 2022-08-25 DIAGNOSIS — Z79.01 CHRONIC ANTICOAGULATION: ICD-10-CM

## 2022-08-25 DIAGNOSIS — I35.9 AORTIC VALVE DISEASE: ICD-10-CM

## 2022-08-25 LAB
INR PPP: 2.9 (ref 0.8–1.2)
PROTHROMBIN TIME: 28.2 SEC (ref 9–12.5)

## 2022-08-25 PROCEDURE — 85610 PROTHROMBIN TIME: CPT | Performed by: INTERNAL MEDICINE

## 2022-08-25 PROCEDURE — 36415 COLL VENOUS BLD VENIPUNCTURE: CPT | Performed by: INTERNAL MEDICINE

## 2022-09-22 ENCOUNTER — LAB VISIT (OUTPATIENT)
Dept: LAB | Facility: HOSPITAL | Age: 72
End: 2022-09-22
Attending: INTERNAL MEDICINE
Payer: OTHER MISCELLANEOUS

## 2022-09-22 DIAGNOSIS — Z95.2 HISTORY OF HEART VALVE REPLACEMENT: ICD-10-CM

## 2022-09-22 DIAGNOSIS — I35.9 AORTIC VALVE DISEASE: ICD-10-CM

## 2022-09-22 DIAGNOSIS — Z79.01 CHRONIC ANTICOAGULATION: ICD-10-CM

## 2022-09-22 LAB
INR PPP: 3 (ref 0.8–1.2)
PROTHROMBIN TIME: 29.6 SEC (ref 9–12.5)

## 2022-09-22 PROCEDURE — 36415 COLL VENOUS BLD VENIPUNCTURE: CPT | Performed by: INTERNAL MEDICINE

## 2022-09-22 PROCEDURE — 85610 PROTHROMBIN TIME: CPT | Performed by: INTERNAL MEDICINE

## 2022-09-23 ENCOUNTER — TELEPHONE (OUTPATIENT)
Dept: CARDIOLOGY | Facility: CLINIC | Age: 72
End: 2022-09-23
Payer: MEDICARE

## 2022-09-23 NOTE — TELEPHONE ENCOUNTER
Present warfarin dose:  4.5 mg 4/7 & 3 mg 3/7.    Patient notified and verbalized understanding.    ----- Message from Sharad Delgadillo MD sent at 9/22/2022  4:46 PM CDT -----  Continue the current dosing of warfarin.    Recheck INR in 4 weeks.    Sharad Delgadillo M.D.

## 2022-10-12 ENCOUNTER — LAB VISIT (OUTPATIENT)
Dept: LAB | Facility: HOSPITAL | Age: 72
End: 2022-10-12
Attending: INTERNAL MEDICINE
Payer: MEDICARE

## 2022-10-12 DIAGNOSIS — Z95.2 HISTORY OF HEART VALVE REPLACEMENT: ICD-10-CM

## 2022-10-12 DIAGNOSIS — I35.9 AORTIC VALVE DISEASE: ICD-10-CM

## 2022-10-12 DIAGNOSIS — I48.0 PAROXYSMAL ATRIAL FIBRILLATION: ICD-10-CM

## 2022-10-12 DIAGNOSIS — Z79.01 CHRONIC ANTICOAGULATION: ICD-10-CM

## 2022-10-12 LAB
INR PPP: 2.2 (ref 0.8–1.2)
INR PPP: 2.2 (ref 0.8–1.2)
PROTHROMBIN TIME: 22.2 SEC (ref 9–12.5)
PROTHROMBIN TIME: 22.2 SEC (ref 9–12.5)

## 2022-10-12 PROCEDURE — 85610 PROTHROMBIN TIME: CPT | Performed by: INTERNAL MEDICINE

## 2022-10-12 PROCEDURE — 36415 COLL VENOUS BLD VENIPUNCTURE: CPT | Mod: PO | Performed by: INTERNAL MEDICINE

## 2022-11-04 ENCOUNTER — OFFICE VISIT (OUTPATIENT)
Dept: CARDIOLOGY | Facility: CLINIC | Age: 72
End: 2022-11-04
Attending: INTERNAL MEDICINE
Payer: MEDICARE

## 2022-11-04 VITALS
BODY MASS INDEX: 29.39 KG/M2 | DIASTOLIC BLOOD PRESSURE: 60 MMHG | HEIGHT: 69 IN | HEART RATE: 74 BPM | WEIGHT: 198.44 LBS | OXYGEN SATURATION: 98 % | SYSTOLIC BLOOD PRESSURE: 120 MMHG

## 2022-11-04 DIAGNOSIS — I10 PRIMARY HYPERTENSION: ICD-10-CM

## 2022-11-04 DIAGNOSIS — Z79.01 CHRONIC ANTICOAGULATION: ICD-10-CM

## 2022-11-04 DIAGNOSIS — Z95.2 HISTORY OF HEART VALVE REPLACEMENT: ICD-10-CM

## 2022-11-04 DIAGNOSIS — E11.59 TYPE 2 DIABETES MELLITUS WITH OTHER CIRCULATORY COMPLICATION, WITHOUT LONG-TERM CURRENT USE OF INSULIN: ICD-10-CM

## 2022-11-04 DIAGNOSIS — E78.00 HYPERCHOLESTEROLEMIA: ICD-10-CM

## 2022-11-04 DIAGNOSIS — I44.7 LEFT BUNDLE BRANCH BLOCK: ICD-10-CM

## 2022-11-04 DIAGNOSIS — Z95.5 HISTORY OF CORONARY ARTERY STENT PLACEMENT: ICD-10-CM

## 2022-11-04 DIAGNOSIS — I50.22 HEART FAILURE, SYSTOLIC, CHRONIC: ICD-10-CM

## 2022-11-04 DIAGNOSIS — I25.10 CORONARY ARTERY DISEASE INVOLVING NATIVE CORONARY ARTERY OF NATIVE HEART WITHOUT ANGINA PECTORIS: ICD-10-CM

## 2022-11-04 DIAGNOSIS — I35.9 AORTIC VALVE DISEASE: ICD-10-CM

## 2022-11-04 DIAGNOSIS — E66.3 OVERWEIGHT: ICD-10-CM

## 2022-11-04 DIAGNOSIS — K21.9 GASTROESOPHAGEAL REFLUX DISEASE WITHOUT ESOPHAGITIS: ICD-10-CM

## 2022-11-04 PROCEDURE — 99215 PR OFFICE/OUTPT VISIT, EST, LEVL V, 40-54 MIN: ICD-10-PCS | Mod: S$GLB,,, | Performed by: INTERNAL MEDICINE

## 2022-11-04 PROCEDURE — 99499 RISK ADDL DX/OHS AUDIT: ICD-10-PCS | Mod: HCNC,S$GLB,, | Performed by: INTERNAL MEDICINE

## 2022-11-04 PROCEDURE — 99999 PR PBB SHADOW E&M-EST. PATIENT-LVL III: CPT | Mod: PBBFAC,,, | Performed by: INTERNAL MEDICINE

## 2022-11-04 PROCEDURE — 99499 UNLISTED E&M SERVICE: CPT | Mod: HCNC,S$GLB,, | Performed by: INTERNAL MEDICINE

## 2022-11-04 PROCEDURE — 99999 PR PBB SHADOW E&M-EST. PATIENT-LVL III: ICD-10-PCS | Mod: PBBFAC,,, | Performed by: INTERNAL MEDICINE

## 2022-11-04 PROCEDURE — 99215 OFFICE O/P EST HI 40 MIN: CPT | Mod: S$GLB,,, | Performed by: INTERNAL MEDICINE

## 2022-11-04 RX ORDER — WARFARIN 3 MG/1
TABLET ORAL
Qty: 140 TABLET | Refills: 3 | Status: SHIPPED | OUTPATIENT
Start: 2022-11-04 | End: 2023-03-13 | Stop reason: SDUPTHER

## 2022-11-04 NOTE — PROGRESS NOTES
Subjective:     Kanu Carrero is a 72 y.o.male with hypertension, hypercholesterolemia and diabetes mellitus type 2. He is mildly obese. He has coronary artery disease with mild left main disease and a history of an intervention of the right coronary artery in 2008. He has left bundle branch block. He had moderate to severe aortic stenosis. In the fall of 2013 developed mild exertional chest pressure. He underwent cardiac catheterization on 1/30/2014 which revealed an aortic valve area of 1.1 cm2. No obstructive coronary artery disease was seen with an about 40% left main stenosis. There was mild left ventricular dysfunction. He underwent aortic valve replacement receiving a [employer] mechanical valve on 3/11/2014. He was begun on warfarin with anticoagulation managed by Dr. Delgadillo. He was diagnosed with iron deficiency anemia in 10/2016. He underwent an esophagogastroduodenoscopy and colonoscopy that he said were negative. It was decided to discontine aspirin that he was then taking in addition to being anticoagulated. No exertional chest discomfort or exertional dyspnea. No palpitations or weak spells. No bleeding. No issues with any of his prescribed medications. Feeling well overall.      Coronary Artery Disease  Presents for follow-up visit. The disease course has been stable. The condition has lasted for  years. Pertinent negatives include no chest pain, chest pressure, chest tightness, dizziness, leg swelling, muscle weakness, palpitations, shortness of breath or weight gain. Risk factors include diabetes, hyperlipidemia, hypertension and obesity. Risk factors do not include decreased physical activity. His past medical history is significant for CHF. There is no history of arrhythmia. The symptoms have been stable.   Congestive Heart Failure  Presents for follow-up visit. The disease course has been stable. The condition has lasted for  years. Pertinent negatives include no abdominal pain, chest pain,  chest pressure, claudication, edema, fatigue, muscle weakness, near-syncope, nocturia, orthopnea, palpitations, paroxysmal nocturnal dyspnea, shortness of breath or unexpected weight change. The symptoms have been stable. His past medical history is significant for CAD, DM and HTN. There is no history of arrhythmia or chronic lung disease.   Hypertension  This is a chronic problem. The current episode started more than 1 year ago. The problem is unchanged. The problem is controlled (usually 115-125/55-60 mmHg at home). Pertinent negatives include no anxiety, blurred vision, chest pain, headaches, malaise/fatigue, neck pain, orthopnea, palpitations, peripheral edema, PND, shortness of breath or sweats. There is no history of chronic renal disease.   Hyperlipidemia  This is a chronic problem. The current episode started more than 1 year ago. The problem is controlled. Recent lipid tests were reviewed and are variable. Exacerbating diseases include diabetes and obesity. He has no history of chronic renal disease, hypothyroidism, liver disease or nephrotic syndrome. Pertinent negatives include no chest pain, focal sensory loss, focal weakness, leg pain, myalgias or shortness of breath.     Review of Systems   Constitutional: Negative for chills, fatigue, fever, malaise/fatigue, unexpected weight change and weight gain.   HENT:  Negative for hoarse voice and nosebleeds.    Eyes:  Negative for blurred vision, pain, vision loss in left eye and vision loss in right eye.   Cardiovascular:  Negative for chest pain, claudication, dyspnea on exertion, irregular heartbeat, leg swelling, near-syncope, orthopnea, palpitations, paroxysmal nocturnal dyspnea and syncope.   Respiratory:  Negative for chest tightness, cough, hemoptysis, shortness of breath and wheezing.    Endocrine: Negative for cold intolerance and heat intolerance.   Hematologic/Lymphatic: Negative for bleeding problem. Does not bruise/bleed easily.   Skin:   Negative for color change and rash.   Musculoskeletal:  Negative for back pain, falls, gout, muscle weakness, myalgias and neck pain.   Gastrointestinal:  Negative for abdominal pain, heartburn, hematemesis, hematochezia, hemorrhoids, jaundice, melena, nausea and vomiting.   Genitourinary:  Negative for dysuria, hematuria and nocturia.   Neurological:  Negative for dizziness, focal weakness, headaches, light-headedness, loss of balance, numbness, tremors and vertigo.   Psychiatric/Behavioral:  Negative for altered mental status, depression and memory loss. The patient is not nervous/anxious.    Allergic/Immunologic: Negative for hives and persistent infections.       Current Outpatient Medications on File Prior to Visit   Medication Sig Dispense Refill    amLODIPine (NORVASC) 10 MG tablet Take 1 tablet (10 mg total) by mouth once daily. 90 tablet 3    chlorthalidone (HYGROTEN) 25 MG Tab Take 1 tablet (25 mg total) by mouth once daily. 90 tablet 3    EScitalopram oxalate (LEXAPRO) 10 MG tablet TAKE 1 TABLET BY MOUTH DAILY 90 tablet 4    ezetimibe (ZETIA) 10 mg tablet Take 1 tablet (10 mg total) by mouth once daily. 90 tablet 3    ferrous sulfate (FEOSOL) 325 mg (65 mg iron) Tab tablet Take 1 tablet (325 mg total) by mouth daily with breakfast. 90 tablet 3    levocetirizine (XYZAL) 5 MG tablet Take 1 tablet (5 mg total) by mouth every evening. 90 tablet 3    metFORMIN (GLUCOPHAGE) 500 MG tablet Take 1 tablet (500 mg total) by mouth 2 (two) times daily. 180 tablet 3    MULTIVITAMIN W-MINERALS/LUTEIN (CENTRUM SILVER ORAL) Take by mouth.        omeprazole (PRILOSEC) 20 MG capsule Take 20 mg by mouth once daily.   11    ramipriL (ALTACE) 10 MG capsule Take 1 capsule (10 mg total) by mouth once daily. 90 capsule 3    rosuvastatin (CRESTOR) 20 MG tablet Take 1 tablet (20 mg total) by mouth once daily. 90 tablet 3    warfarin (JANTOVEN) 3 MG tablet DOSE TO BE ADJUSTED        ACCORDING TO INTERNATIONAL NORMALIZED RATIO 140  "tablet 0     No current facility-administered medications on file prior to visit.       /60 (BP Location: Right arm, Patient Position: Sitting)   Pulse 74   Ht 5' 9" (1.753 m)   Wt 90 kg (198 lb 6.6 oz)   SpO2 98%   BMI 29.30 kg/m²       Objective:     Physical Exam  Constitutional:       General: He is not in acute distress.     Appearance: Normal appearance. He is well-developed. He is not ill-appearing or diaphoretic.   HENT:      Head: Normocephalic and atraumatic.      Nose: Nose normal.   Eyes:      General:         Right eye: No discharge.         Left eye: No discharge.      Conjunctiva/sclera:      Right eye: Right conjunctiva is not injected.      Left eye: Left conjunctiva is not injected.      Pupils: Pupils are equal.      Right eye: Pupil is round.      Left eye: Pupil is round.   Neck:      Thyroid: No thyroid mass or thyromegaly.      Vascular: No carotid bruit or JVD.   Cardiovascular:      Rate and Rhythm: Regular rhythm. Bradycardia present. No extrasystoles are present.     Chest Wall: PMI is not displaced.      Pulses:           Radial pulses are 2+ on the right side and 2+ on the left side.        Femoral pulses are 2+ on the right side and 2+ on the left side.       Dorsalis pedis pulses are 2+ on the right side and 2+ on the left side.        Posterior tibial pulses are 2+ on the right side and 2+ on the left side.      Heart sounds: S1 normal. Murmur heard.   Harsh midsystolic murmur is present with a grade of 3/6 at the upper right sternal border. Mechanical S2.     No gallop.      Comments: Mechanical S2.  Pulmonary:      Effort: Pulmonary effort is normal.      Breath sounds: Normal breath sounds.   Abdominal:      Palpations: Abdomen is soft.      Tenderness: There is no abdominal tenderness.   Musculoskeletal:      Cervical back: Neck supple.      Right ankle: Swelling present. No deformity or ecchymosis.      Left ankle: Swelling present. No deformity or ecchymosis. "   Lymphadenopathy:      Head:      Right side of head: No submandibular adenopathy.      Left side of head: No submandibular adenopathy.      Cervical: No cervical adenopathy.   Skin:     General: Skin is warm and dry.   Neurological:      General: No focal deficit present.      Mental Status: He is alert and oriented to person, place, and time. He is not disoriented.      Cranial Nerves: No cranial nerve deficit.   Psychiatric:         Attention and Perception: Attention and perception normal.         Mood and Affect: Mood and affect normal.         Speech: Speech normal.         Behavior: Behavior normal.         Thought Content: Thought content normal.         Cognition and Memory: Cognition and memory normal.         Judgment: Judgment normal.        Assessment:     1. Aortic valve disease    2. History of heart valve replacement    3. Chronic anticoagulation    4. Coronary artery disease involving native coronary artery of native heart without angina pectoris    5. History of coronary artery stent placement    6. Heart failure, systolic, chronic    7. Left bundle branch block    8. Primary hypertension    9. Hypercholesterolemia    10. Type 2 diabetes mellitus with other circulatory complication, without long-term current use of insulin    11. Overweight    12. Gastroesophageal reflux disease without esophagitis        Plan:     1. Aortic Valve Disease              5/22/2013: Echo: Mild AS 3.0 m/s.   Fall 2013: Mild to moderate exertional chest pressure.   1/21/2014: Echo: Normal LV size with low normal LV function. EF 50-55%. LBBB. Moderate AS - 3.2 m/s - 1.2 cm2. Mild to moderate MR.              1/21/2014: Carotid Duplex: Mild plaquing.              1/30/2014: [facility]: Cath: Severe aortic stenosis - 1.1 cm2. Mean gradient 47 mm Hg. Mild CAD. Mild LV dysfunction with EF 50%.              3/11/2014: [facility]: AVR: [facility] Mechanical Valve - 23 mm.   5/28/2014: Echo: Mechanical aortic valve - 2.4 m/s - mild  AR.   5/25/2021: Echo: Mechanical AVR - fine - MG 16 mmHg - DI 0.53.   On warfarin.   Knows about endocarditis prophylaxis.   Stable.    2. Chronic Anticoagulation   3/2014: Began warfarin for mechanical aortic valve. Goal INR 2-3. Anticoagulation managed by Dr. Delgadillo.    2018: Aspirin 81 mg was discontinued due to iron deficiency anemia.   On warfarin.   INR followed.   No aspirin or NSAIDs.    No obvious bleeding.    3. Coronary Artery Disease   2008: [facility]: Cath: RCA: Stent.   6/8/2010: [facility]: Cath: LM 40%. RCA: Stent patent. EF 40-45%.              No aspirin as he has iron deficiency anemia and is anticoagulated.              Stable.    4. Heart Failure, Systolic and Diastolic, Chronic   2005: Diagnosed. EF 40-45%.   5/22/2013: Echo: EF 50-55%.   5/28/2014: Echo: Normal left ventricular size and function. Mild LVH. Mildly dilated LA. Mechanical aortic valve - 2.4 m/s - mild AR.   9/8/2016: Echo: Normal left ventricular size and systolic function. Mild LVH. Moderate diastolic dysfunction. LBBB. Moderately dilated LA. Mechanical AVR fine - 2.7 m/s.   5/25/2021: Echo: The left ventricle is mildly dilated with low normal systolic function. EF 50%. The mid anteroseptal wall and apical septum are moderately hypokinetic. The basal inferolateral wall is severely hypokinetic. The remainder contract well. LBBB. Mild diastolic dysfunction. Mildly dilated LA. Mechanical AVR - fine - MG 16 mmHg - DI 0.53.   3/1/2019: Metoprolol was discontinued due to HR of 46 bpm.               On ramipril 10 mg Q24 and chlorthalidone 25 mg Q24.   Well compensated.   11/4/2022: Empagliflozin 10 mg Q24 to begin. BMP and BNP in 2 weeks.   5/2023: Plan next Echo. Then every few years.     5. Left Bundle Branch Block              2005: Diagnosed.   9/8/2017: SB. LBBB.  ms.   10/19/2018: SB 52. LBBB with  msec.   3/1/2019: HR 46 bpm.   7/5/2019: HR 68 bpm.   5/20/2021: HR 67.  ms. LBBB with  ms.   7/1/2022: ECG:  LBBB.  ms.              Stable.                6. Hypertension              2005: Diagnosed.   3/1/2019: Metoprolol was discontinued due to HR of 46 bpm.    On amlodipine 10 mg Q24, ramipril 10 mg Q24 and chlorthalidone 25 mg Q24.   Leg edema resolved with diuretic.   Keeping log at home.   Well controlled.     7. Hypercholesterolemia   2007: Began statin.              On atorvastatin 80 mg Q24.   12/16/2016: Chol 134. HDL 35. LDL 74. .   May have had some muscle aches while on atorvastatin.    On rosuvastatin 10 mg Q24.   9/13/2017: Chol 158. HDL 33. LDL 96. .   On rosuvastatin 20 mg Q24.   9/14/2018: Chol 153. HDL 35. LDL 92. .   2/20/2019: Chol 151. HDL 35. LDL 86. .   7/9/2020: Chol 169. HDL 40. LDL 95. .   On rosuvastatin 20 mg Q24.   9/16/2020: Ezetimibe 10 mg Q24 was begun.   On rosuvastatin 20 mg Q24 and ezetimibe 10 mg Q24    11/18/2020: Chol 109. HDL 34. LDL 50. .   6/3/2021: Chol 112. HDL 33. LDL 49. .   6/3/2022: Chol 113. HDL 41. LDL 48. .   On rosuvastatin 20 mg Q24 and ezetimibe 10 mg Q24    Very favorable lipid panel.     8. Diabetes Mellitus, Type 2   3/2014: Diagnosed. Complications: CAD. Medications: Oral agent.   On metformin 500 mg Q12.   6/3/2022: HgbA1C 5.8%.   Well controlled.   11/4/2022: Empagliflozin 10 mg Q24 to begin for HF. Metformin to be discontinued. BMP and BNP in 2 weeks.    9. Mild Obesity   5/5/2017: Weight 97 kg. BMI 32.   9/8/2017: Weight 95 kg. BMI 31.   6/1/2018: Weight 92 kg. BMI 30.   10/19/2018: Weight 89 kg. BMI 29.   3/1/2019: Weight 94 kg. BMI 31.   11/1/2019: Weight 92 kg. BMI 30.   9/16/2020: Weight 96 kg. BMI 31.    Encouraged to lose weight.    10. Gastroesophageal Reflux Disease   2008: Diagnosed.   On omeprazole 20 mg Q24.    11. Anemia   10/2016: Diagnosed with iron deficiency anemia.   10/4/2016: EGD & Colonoscopy: Negative.   Receiving iron.    12. Primary Care              Dr. Olvin Hanley.    F/u 4  months.    Sharad Delgadillo M.D.

## 2022-11-08 ENCOUNTER — PATIENT MESSAGE (OUTPATIENT)
Dept: FAMILY MEDICINE | Facility: CLINIC | Age: 72
End: 2022-11-08
Payer: MEDICARE

## 2022-11-10 ENCOUNTER — LAB VISIT (OUTPATIENT)
Dept: LAB | Facility: HOSPITAL | Age: 72
End: 2022-11-10
Attending: INTERNAL MEDICINE
Payer: MEDICARE

## 2022-11-10 DIAGNOSIS — Z79.01 CHRONIC ANTICOAGULATION: ICD-10-CM

## 2022-11-10 DIAGNOSIS — Z95.2 HISTORY OF HEART VALVE REPLACEMENT: ICD-10-CM

## 2022-11-10 LAB
INR PPP: 2.7 (ref 0.8–1.2)
PROTHROMBIN TIME: 26.9 SEC (ref 9–12.5)

## 2022-11-10 PROCEDURE — 36415 COLL VENOUS BLD VENIPUNCTURE: CPT | Performed by: INTERNAL MEDICINE

## 2022-11-10 PROCEDURE — 85610 PROTHROMBIN TIME: CPT | Performed by: INTERNAL MEDICINE

## 2022-11-11 ENCOUNTER — TELEPHONE (OUTPATIENT)
Dept: CARDIOLOGY | Facility: CLINIC | Age: 72
End: 2022-11-11
Payer: MEDICARE

## 2022-11-11 NOTE — TELEPHONE ENCOUNTER
Left a detailed message on voice mail.        ----- Message from Sharad Delgadillo MD sent at 11/10/2022  5:55 PM CST -----  Continue the current dosing of warfarin.    Recheck INR in 4 weeks.    Sharad Delgadillo M.D.

## 2022-11-25 ENCOUNTER — LAB VISIT (OUTPATIENT)
Dept: LAB | Facility: HOSPITAL | Age: 72
End: 2022-11-25
Attending: INTERNAL MEDICINE
Payer: OTHER MISCELLANEOUS

## 2022-11-25 DIAGNOSIS — I50.22 HEART FAILURE, SYSTOLIC, CHRONIC: ICD-10-CM

## 2022-11-25 DIAGNOSIS — E11.59 TYPE 2 DIABETES MELLITUS WITH OTHER CIRCULATORY COMPLICATION, WITHOUT LONG-TERM CURRENT USE OF INSULIN: ICD-10-CM

## 2022-11-25 LAB
ANION GAP SERPL CALC-SCNC: 10 MMOL/L (ref 8–16)
BNP SERPL-MCNC: 136 PG/ML (ref 0–99)
BUN SERPL-MCNC: 24 MG/DL (ref 8–23)
CALCIUM SERPL-MCNC: 9.6 MG/DL (ref 8.7–10.5)
CHLORIDE SERPL-SCNC: 103 MMOL/L (ref 95–110)
CO2 SERPL-SCNC: 26 MMOL/L (ref 23–29)
CREAT SERPL-MCNC: 1 MG/DL (ref 0.5–1.4)
EST. GFR  (NO RACE VARIABLE): >60 ML/MIN/1.73 M^2
ESTIMATED AVG GLUCOSE: 117 MG/DL (ref 68–131)
GLUCOSE SERPL-MCNC: 97 MG/DL (ref 70–110)
HBA1C MFR BLD: 5.7 % (ref 4–5.6)
POTASSIUM SERPL-SCNC: 4 MMOL/L (ref 3.5–5.1)
SODIUM SERPL-SCNC: 139 MMOL/L (ref 136–145)

## 2022-11-25 PROCEDURE — 80048 BASIC METABOLIC PNL TOTAL CA: CPT | Performed by: INTERNAL MEDICINE

## 2022-11-25 PROCEDURE — 36415 COLL VENOUS BLD VENIPUNCTURE: CPT | Performed by: INTERNAL MEDICINE

## 2022-11-25 PROCEDURE — 83880 ASSAY OF NATRIURETIC PEPTIDE: CPT | Performed by: INTERNAL MEDICINE

## 2022-11-25 PROCEDURE — 83036 HEMOGLOBIN GLYCOSYLATED A1C: CPT | Performed by: INTERNAL MEDICINE

## 2022-11-25 NOTE — TELEPHONE ENCOUNTER
Patient notified and verbalized understanding.    ----- Message from Sharad Delgadillo MD sent at 11/25/2022 12:24 PM CST -----  DMM2 is very well controlled.    Sharad Delgadillo M.D.

## 2022-11-25 NOTE — TELEPHONE ENCOUNTER
Patient notified and verbalized understanding.    ----- Message from Sharad Delgadillo MD sent at 11/25/2022 12:21 PM CST -----  HgbA1C very favorable.    Sharad Delgadillo M.D.

## 2022-11-28 ENCOUNTER — TELEPHONE (OUTPATIENT)
Dept: CARDIOLOGY | Facility: CLINIC | Age: 72
End: 2022-11-28
Payer: MEDICARE

## 2022-11-28 NOTE — TELEPHONE ENCOUNTER
Patient notified and verbalized understanding.      ----- Message from Sharad Delgadillo MD sent at 11/25/2022  3:54 PM CST -----  BNP favorable. Heart does not appear to be under strain.    Sharad Delgadillo M.D.

## 2022-12-01 ENCOUNTER — LAB VISIT (OUTPATIENT)
Dept: LAB | Facility: HOSPITAL | Age: 72
End: 2022-12-01
Attending: FAMILY MEDICINE
Payer: MEDICARE

## 2022-12-01 DIAGNOSIS — E11.65 TYPE 2 DIABETES MELLITUS WITH HYPERGLYCEMIA, WITHOUT LONG-TERM CURRENT USE OF INSULIN: ICD-10-CM

## 2022-12-01 DIAGNOSIS — I10 PRIMARY HYPERTENSION: ICD-10-CM

## 2022-12-01 DIAGNOSIS — E78.00 HYPERCHOLESTEROLEMIA: ICD-10-CM

## 2022-12-01 LAB
ALBUMIN SERPL BCP-MCNC: 4.5 G/DL (ref 3.5–5.2)
ALP SERPL-CCNC: 49 U/L (ref 55–135)
ALT SERPL W/O P-5'-P-CCNC: 32 U/L (ref 10–44)
ANION GAP SERPL CALC-SCNC: 12 MMOL/L (ref 8–16)
AST SERPL-CCNC: 27 U/L (ref 10–40)
BASOPHILS # BLD AUTO: 0.07 K/UL (ref 0–0.2)
BASOPHILS NFR BLD: 0.9 % (ref 0–1.9)
BILIRUB SERPL-MCNC: 0.6 MG/DL (ref 0.1–1)
BUN SERPL-MCNC: 18 MG/DL (ref 8–23)
CALCIUM SERPL-MCNC: 10.5 MG/DL (ref 8.7–10.5)
CHLORIDE SERPL-SCNC: 100 MMOL/L (ref 95–110)
CHOLEST SERPL-MCNC: 129 MG/DL (ref 120–199)
CHOLEST/HDLC SERPL: 3.4 {RATIO} (ref 2–5)
CO2 SERPL-SCNC: 26 MMOL/L (ref 23–29)
CREAT SERPL-MCNC: 0.9 MG/DL (ref 0.5–1.4)
DIFFERENTIAL METHOD: ABNORMAL
EOSINOPHIL # BLD AUTO: 0 K/UL (ref 0–0.5)
EOSINOPHIL NFR BLD: 0.1 % (ref 0–8)
ERYTHROCYTE [DISTWIDTH] IN BLOOD BY AUTOMATED COUNT: 14.8 % (ref 11.5–14.5)
EST. GFR  (NO RACE VARIABLE): >60 ML/MIN/1.73 M^2
ESTIMATED AVG GLUCOSE: 123 MG/DL (ref 68–131)
GLUCOSE SERPL-MCNC: 84 MG/DL (ref 70–110)
HBA1C MFR BLD: 5.9 % (ref 4–5.6)
HCT VFR BLD AUTO: 42.5 % (ref 40–54)
HDLC SERPL-MCNC: 38 MG/DL (ref 40–75)
HDLC SERPL: 29.5 % (ref 20–50)
HGB BLD-MCNC: 13.5 G/DL (ref 14–18)
IMM GRANULOCYTES # BLD AUTO: 0.02 K/UL (ref 0–0.04)
IMM GRANULOCYTES NFR BLD AUTO: 0.3 % (ref 0–0.5)
LDLC SERPL CALC-MCNC: 65.2 MG/DL (ref 63–159)
LYMPHOCYTES # BLD AUTO: 1.8 K/UL (ref 1–4.8)
LYMPHOCYTES NFR BLD: 23.4 % (ref 18–48)
MCH RBC QN AUTO: 27.8 PG (ref 27–31)
MCHC RBC AUTO-ENTMCNC: 31.8 G/DL (ref 32–36)
MCV RBC AUTO: 87 FL (ref 82–98)
MONOCYTES # BLD AUTO: 0.6 K/UL (ref 0.3–1)
MONOCYTES NFR BLD: 7.7 % (ref 4–15)
NEUTROPHILS # BLD AUTO: 5.3 K/UL (ref 1.8–7.7)
NEUTROPHILS NFR BLD: 67.6 % (ref 38–73)
NONHDLC SERPL-MCNC: 91 MG/DL
NRBC BLD-RTO: 0 /100 WBC
PLATELET # BLD AUTO: 453 K/UL (ref 150–450)
PMV BLD AUTO: 9.8 FL (ref 9.2–12.9)
POTASSIUM SERPL-SCNC: 4.1 MMOL/L (ref 3.5–5.1)
PROT SERPL-MCNC: 8 G/DL (ref 6–8.4)
RBC # BLD AUTO: 4.86 M/UL (ref 4.6–6.2)
SODIUM SERPL-SCNC: 138 MMOL/L (ref 136–145)
TRIGL SERPL-MCNC: 129 MG/DL (ref 30–150)
WBC # BLD AUTO: 7.77 K/UL (ref 3.9–12.7)

## 2022-12-01 PROCEDURE — 83036 HEMOGLOBIN GLYCOSYLATED A1C: CPT | Performed by: FAMILY MEDICINE

## 2022-12-01 PROCEDURE — 80061 LIPID PANEL: CPT | Performed by: FAMILY MEDICINE

## 2022-12-01 PROCEDURE — 85025 COMPLETE CBC W/AUTO DIFF WBC: CPT | Performed by: FAMILY MEDICINE

## 2022-12-01 PROCEDURE — 36415 COLL VENOUS BLD VENIPUNCTURE: CPT | Mod: PO | Performed by: FAMILY MEDICINE

## 2022-12-01 PROCEDURE — 80053 COMPREHEN METABOLIC PANEL: CPT | Performed by: FAMILY MEDICINE

## 2022-12-06 ENCOUNTER — LAB VISIT (OUTPATIENT)
Dept: LAB | Facility: HOSPITAL | Age: 72
End: 2022-12-06
Attending: FAMILY MEDICINE
Payer: MEDICARE

## 2022-12-06 ENCOUNTER — TELEPHONE (OUTPATIENT)
Dept: CARDIOLOGY | Facility: CLINIC | Age: 72
End: 2022-12-06
Payer: MEDICARE

## 2022-12-06 ENCOUNTER — OFFICE VISIT (OUTPATIENT)
Dept: FAMILY MEDICINE | Facility: CLINIC | Age: 72
End: 2022-12-06
Payer: MEDICARE

## 2022-12-06 VITALS
WEIGHT: 200.63 LBS | HEIGHT: 69 IN | OXYGEN SATURATION: 98 % | SYSTOLIC BLOOD PRESSURE: 100 MMHG | DIASTOLIC BLOOD PRESSURE: 50 MMHG | BODY MASS INDEX: 29.71 KG/M2 | HEART RATE: 74 BPM

## 2022-12-06 DIAGNOSIS — E78.5 DYSLIPIDEMIA: ICD-10-CM

## 2022-12-06 DIAGNOSIS — I50.22 HEART FAILURE, SYSTOLIC, CHRONIC: ICD-10-CM

## 2022-12-06 DIAGNOSIS — Z12.5 SCREENING FOR PROSTATE CANCER: ICD-10-CM

## 2022-12-06 DIAGNOSIS — I10 ESSENTIAL HYPERTENSION: ICD-10-CM

## 2022-12-06 DIAGNOSIS — E11.65 TYPE 2 DIABETES MELLITUS WITH HYPERGLYCEMIA, WITHOUT LONG-TERM CURRENT USE OF INSULIN: Primary | ICD-10-CM

## 2022-12-06 DIAGNOSIS — D50.8 OTHER IRON DEFICIENCY ANEMIA: ICD-10-CM

## 2022-12-06 PROCEDURE — 3066F PR DOCUMENTATION OF TREATMENT FOR NEPHROPATHY: ICD-10-PCS | Mod: CPTII,S$GLB,, | Performed by: FAMILY MEDICINE

## 2022-12-06 PROCEDURE — 99215 OFFICE O/P EST HI 40 MIN: CPT | Mod: S$GLB,,, | Performed by: FAMILY MEDICINE

## 2022-12-06 PROCEDURE — 84153 ASSAY OF PSA TOTAL: CPT | Performed by: FAMILY MEDICINE

## 2022-12-06 PROCEDURE — 1101F PT FALLS ASSESS-DOCD LE1/YR: CPT | Mod: CPTII,S$GLB,, | Performed by: FAMILY MEDICINE

## 2022-12-06 PROCEDURE — 36415 COLL VENOUS BLD VENIPUNCTURE: CPT | Mod: PO | Performed by: FAMILY MEDICINE

## 2022-12-06 PROCEDURE — 1101F PR PT FALLS ASSESS DOC 0-1 FALLS W/OUT INJ PAST YR: ICD-10-PCS | Mod: CPTII,S$GLB,, | Performed by: FAMILY MEDICINE

## 2022-12-06 PROCEDURE — 3044F PR MOST RECENT HEMOGLOBIN A1C LEVEL <7.0%: ICD-10-PCS | Mod: CPTII,S$GLB,, | Performed by: FAMILY MEDICINE

## 2022-12-06 PROCEDURE — 99215 PR OFFICE/OUTPT VISIT, EST, LEVL V, 40-54 MIN: ICD-10-PCS | Mod: S$GLB,,, | Performed by: FAMILY MEDICINE

## 2022-12-06 PROCEDURE — 3060F POS MICROALBUMINURIA REV: CPT | Mod: CPTII,S$GLB,, | Performed by: FAMILY MEDICINE

## 2022-12-06 PROCEDURE — 1157F ADVNC CARE PLAN IN RCRD: CPT | Mod: CPTII,S$GLB,, | Performed by: FAMILY MEDICINE

## 2022-12-06 PROCEDURE — 1125F PR PAIN SEVERITY QUANTIFIED, PAIN PRESENT: ICD-10-PCS | Mod: CPTII,S$GLB,, | Performed by: FAMILY MEDICINE

## 2022-12-06 PROCEDURE — 3288F PR FALLS RISK ASSESSMENT DOCUMENTED: ICD-10-PCS | Mod: CPTII,S$GLB,, | Performed by: FAMILY MEDICINE

## 2022-12-06 PROCEDURE — 4010F ACE/ARB THERAPY RXD/TAKEN: CPT | Mod: CPTII,S$GLB,, | Performed by: FAMILY MEDICINE

## 2022-12-06 PROCEDURE — 3074F SYST BP LT 130 MM HG: CPT | Mod: CPTII,S$GLB,, | Performed by: FAMILY MEDICINE

## 2022-12-06 PROCEDURE — 3078F PR MOST RECENT DIASTOLIC BLOOD PRESSURE < 80 MM HG: ICD-10-PCS | Mod: CPTII,S$GLB,, | Performed by: FAMILY MEDICINE

## 2022-12-06 PROCEDURE — 3078F DIAST BP <80 MM HG: CPT | Mod: CPTII,S$GLB,, | Performed by: FAMILY MEDICINE

## 2022-12-06 PROCEDURE — 3008F PR BODY MASS INDEX (BMI) DOCUMENTED: ICD-10-PCS | Mod: CPTII,S$GLB,, | Performed by: FAMILY MEDICINE

## 2022-12-06 PROCEDURE — 3060F PR POS MICROALBUMINURIA RESULT DOCUMENTED/REVIEW: ICD-10-PCS | Mod: CPTII,S$GLB,, | Performed by: FAMILY MEDICINE

## 2022-12-06 PROCEDURE — 1160F PR REVIEW ALL MEDS BY PRESCRIBER/CLIN PHARMACIST DOCUMENTED: ICD-10-PCS | Mod: CPTII,S$GLB,, | Performed by: FAMILY MEDICINE

## 2022-12-06 PROCEDURE — 1159F PR MEDICATION LIST DOCUMENTED IN MEDICAL RECORD: ICD-10-PCS | Mod: CPTII,S$GLB,, | Performed by: FAMILY MEDICINE

## 2022-12-06 PROCEDURE — 3074F PR MOST RECENT SYSTOLIC BLOOD PRESSURE < 130 MM HG: ICD-10-PCS | Mod: CPTII,S$GLB,, | Performed by: FAMILY MEDICINE

## 2022-12-06 PROCEDURE — 3008F BODY MASS INDEX DOCD: CPT | Mod: CPTII,S$GLB,, | Performed by: FAMILY MEDICINE

## 2022-12-06 PROCEDURE — 99999 PR PBB SHADOW E&M-EST. PATIENT-LVL IV: ICD-10-PCS | Mod: PBBFAC,,, | Performed by: FAMILY MEDICINE

## 2022-12-06 PROCEDURE — 1125F AMNT PAIN NOTED PAIN PRSNT: CPT | Mod: CPTII,S$GLB,, | Performed by: FAMILY MEDICINE

## 2022-12-06 PROCEDURE — 1157F PR ADVANCE CARE PLAN OR EQUIV PRESENT IN MEDICAL RECORD: ICD-10-PCS | Mod: CPTII,S$GLB,, | Performed by: FAMILY MEDICINE

## 2022-12-06 PROCEDURE — 3044F HG A1C LEVEL LT 7.0%: CPT | Mod: CPTII,S$GLB,, | Performed by: FAMILY MEDICINE

## 2022-12-06 PROCEDURE — 1159F MED LIST DOCD IN RCRD: CPT | Mod: CPTII,S$GLB,, | Performed by: FAMILY MEDICINE

## 2022-12-06 PROCEDURE — 4010F PR ACE/ARB THEARPY RXD/TAKEN: ICD-10-PCS | Mod: CPTII,S$GLB,, | Performed by: FAMILY MEDICINE

## 2022-12-06 PROCEDURE — 1160F RVW MEDS BY RX/DR IN RCRD: CPT | Mod: CPTII,S$GLB,, | Performed by: FAMILY MEDICINE

## 2022-12-06 PROCEDURE — 3288F FALL RISK ASSESSMENT DOCD: CPT | Mod: CPTII,S$GLB,, | Performed by: FAMILY MEDICINE

## 2022-12-06 PROCEDURE — 3066F NEPHROPATHY DOC TX: CPT | Mod: CPTII,S$GLB,, | Performed by: FAMILY MEDICINE

## 2022-12-06 PROCEDURE — 99999 PR PBB SHADOW E&M-EST. PATIENT-LVL IV: CPT | Mod: PBBFAC,,, | Performed by: FAMILY MEDICINE

## 2022-12-06 NOTE — TELEPHONE ENCOUNTER
Patient will contact surgeon to fax a request for clearance.      ----- Message from Estephanie Souza sent at 12/6/2022 10:30 AM CST -----  Regarding: Advice  Contact: DEYANIRA ACUNA [[id number]]  Name of Who is Calling: Deyanira Acuna            What is the request in detail: Pt states his surgery is Monday and he needs clearance and also advice in regards to stop taking his blood thinner medication. Please advise he is requesting a call back in regards            Can the clinic reply by [facility]: No           What Number to Call Back if not in [facility]:[phone]

## 2022-12-06 NOTE — PROGRESS NOTES
Subjective:       Patient ID: Kanu Carrero is a 72 y.o. male.    Chief Complaint: Follow-up    72 years old male came to the clinic for diabetes follow-up.  Last A1c was normal.  No Polyuria, polydipsia or polyphagia.  Last blood pressure is in the low side.  No chest pain, palpitation orthopnea or PND.  Cholesterol is currently control.  No flare ups of heart failure.  Patient with mild anemia but stable in comparison with previous reports.    Follow-up  Pertinent negatives include no chest pain.   Review of Systems   Constitutional: Negative.    HENT: Negative.     Eyes: Negative.    Respiratory: Negative.     Cardiovascular: Negative.  Negative for chest pain, palpitations, leg swelling and claudication.   Gastrointestinal: Negative.    Endocrine: Negative for polydipsia, polyphagia and polyuria.   Genitourinary: Negative.    Musculoskeletal: Negative.    Integumentary:  Negative.   Neurological: Negative.    Psychiatric/Behavioral: Negative.         Objective:      Physical Exam  Vitals and nursing note reviewed.   Constitutional:       General: He is not in acute distress.     Appearance: He is well-developed. He is not diaphoretic.   HENT:      Head: Normocephalic and atraumatic.      Right Ear: External ear normal.      Left Ear: External ear normal.      Nose: Nose normal.      Mouth/Throat:      Pharynx: No oropharyngeal exudate.   Eyes:      General: No scleral icterus.        Right eye: No discharge.         Left eye: No discharge.      Conjunctiva/sclera: Conjunctivae normal.      Pupils: Pupils are equal, round, and reactive to light.   Neck:      Thyroid: No thyromegaly.      Vascular: No JVD.      Trachea: No tracheal deviation.   Cardiovascular:      Rate and Rhythm: Normal rate and regular rhythm.      Heart sounds: Normal heart sounds. No murmur heard.    No friction rub. No gallop.   Pulmonary:      Effort: Pulmonary effort is normal. No respiratory distress.      Breath sounds: Normal breath  sounds. No stridor. No wheezing or rales.   Chest:      Chest wall: No tenderness.   Abdominal:      General: Bowel sounds are normal. There is no distension.      Palpations: Abdomen is soft. There is no mass.      Tenderness: There is no abdominal tenderness. There is no guarding or rebound.   Musculoskeletal:         General: No tenderness. Normal range of motion.      Cervical back: Normal range of motion and neck supple.   Feet:      Right foot:      Protective Sensation: 10 sites tested.  10 sites sensed.      Skin integrity: No ulcer, blister, skin breakdown, erythema, warmth, callus, dry skin or fissure.      Left foot:      Protective Sensation: 10 sites tested.  10 sites sensed.      Skin integrity: No ulcer, blister, skin breakdown, erythema, warmth, callus, dry skin or fissure.   Lymphadenopathy:      Cervical: No cervical adenopathy.   Skin:     General: Skin is warm and dry.      Coloration: Skin is not pale.      Findings: No erythema or rash.   Neurological:      Mental Status: He is alert and oriented to person, place, and time.      Cranial Nerves: No cranial nerve deficit.      Motor: No abnormal muscle tone.      Coordination: Coordination normal.      Deep Tendon Reflexes: Reflexes are normal and symmetric. Reflexes normal.   Psychiatric:         Behavior: Behavior normal.         Thought Content: Thought content normal.         Judgment: Judgment normal.       Assessment:       Problem List Items Addressed This Visit       Heart failure, systolic, chronic    Relevant Orders    CBC Auto Differential    Comprehensive Metabolic Panel    Type 2 diabetes mellitus with hyperglycemia, without long-term current use of insulin - Primary    Relevant Orders    CBC Auto Differential    Comprehensive Metabolic Panel    Lipid Panel    Hemoglobin A1C    Microalbumin/Creatinine Ratio, Urine    Dyslipidemia    Relevant Orders    CBC Auto Differential    Comprehensive Metabolic Panel    Lipid Panel    Essential  hypertension    Relevant Orders    CBC Auto Differential    Comprehensive Metabolic Panel    Lipid Panel    Iron deficiency anemia    Relevant Orders    CBC Auto Differential     Other Visit Diagnoses       Screening for prostate cancer        Relevant Orders    PSA, Screening              Plan:         Kanu was seen today for follow-up.    Diagnoses and all orders for this visit:    Type 2 diabetes mellitus with hyperglycemia, without long-term current use of insulin  -     CBC Auto Differential; Future  -     Comprehensive Metabolic Panel; Future  -     Lipid Panel; Future  -     Hemoglobin A1C; Future  -     Microalbumin/Creatinine Ratio, Urine; Future    Essential hypertension  -     CBC Auto Differential; Future  -     Comprehensive Metabolic Panel; Future  -     Lipid Panel; Future    Heart failure, systolic, chronic  -     CBC Auto Differential; Future  -     Comprehensive Metabolic Panel; Future    Dyslipidemia  -     CBC Auto Differential; Future  -     Comprehensive Metabolic Panel; Future  -     Lipid Panel; Future    Other iron deficiency anemia  -     CBC Auto Differential; Future    Screening for prostate cancer  -     PSA, Screening; Future

## 2022-12-07 LAB — COMPLEXED PSA SERPL-MCNC: 5.8 NG/ML (ref 0–4)

## 2022-12-12 ENCOUNTER — OFFICE VISIT (OUTPATIENT)
Dept: CARDIOLOGY | Facility: CLINIC | Age: 72
End: 2022-12-12
Attending: INTERNAL MEDICINE
Payer: OTHER MISCELLANEOUS

## 2022-12-12 VITALS
SYSTOLIC BLOOD PRESSURE: 112 MMHG | WEIGHT: 199.06 LBS | OXYGEN SATURATION: 98 % | HEIGHT: 69 IN | BODY MASS INDEX: 29.48 KG/M2 | HEART RATE: 74 BPM | DIASTOLIC BLOOD PRESSURE: 62 MMHG

## 2022-12-12 DIAGNOSIS — I44.7 LEFT BUNDLE BRANCH BLOCK: ICD-10-CM

## 2022-12-12 DIAGNOSIS — Z01.810 PRE-OPERATIVE CARDIOVASCULAR EXAMINATION: ICD-10-CM

## 2022-12-12 DIAGNOSIS — I25.10 CORONARY ARTERY DISEASE INVOLVING NATIVE CORONARY ARTERY OF NATIVE HEART WITHOUT ANGINA PECTORIS: ICD-10-CM

## 2022-12-12 DIAGNOSIS — Z95.5 HISTORY OF CORONARY ARTERY STENT PLACEMENT: ICD-10-CM

## 2022-12-12 DIAGNOSIS — I35.9 AORTIC VALVE DISEASE: ICD-10-CM

## 2022-12-12 DIAGNOSIS — I50.22 HEART FAILURE, SYSTOLIC, CHRONIC: ICD-10-CM

## 2022-12-12 DIAGNOSIS — Z79.01 CHRONIC ANTICOAGULATION: ICD-10-CM

## 2022-12-12 DIAGNOSIS — I10 PRIMARY HYPERTENSION: ICD-10-CM

## 2022-12-12 DIAGNOSIS — E66.3 OVERWEIGHT: ICD-10-CM

## 2022-12-12 DIAGNOSIS — E78.00 HYPERCHOLESTEROLEMIA: ICD-10-CM

## 2022-12-12 DIAGNOSIS — K21.9 GASTROESOPHAGEAL REFLUX DISEASE WITHOUT ESOPHAGITIS: ICD-10-CM

## 2022-12-12 DIAGNOSIS — E11.59 TYPE 2 DIABETES MELLITUS WITH OTHER CIRCULATORY COMPLICATION, WITHOUT LONG-TERM CURRENT USE OF INSULIN: ICD-10-CM

## 2022-12-12 DIAGNOSIS — Z95.2 HISTORY OF HEART VALVE REPLACEMENT: ICD-10-CM

## 2022-12-12 PROCEDURE — 99999 PR PBB SHADOW E&M-EST. PATIENT-LVL III: ICD-10-PCS | Mod: PBBFAC,,, | Performed by: INTERNAL MEDICINE

## 2022-12-12 PROCEDURE — 99215 PR OFFICE/OUTPT VISIT, EST, LEVL V, 40-54 MIN: ICD-10-PCS | Mod: 25,S$GLB,, | Performed by: INTERNAL MEDICINE

## 2022-12-12 PROCEDURE — 93000 ELECTROCARDIOGRAM COMPLETE: CPT | Mod: S$GLB,,, | Performed by: INTERNAL MEDICINE

## 2022-12-12 PROCEDURE — 99999 PR PBB SHADOW E&M-EST. PATIENT-LVL III: CPT | Mod: PBBFAC,,, | Performed by: INTERNAL MEDICINE

## 2022-12-12 PROCEDURE — 93000 PR ELECTROCARDIOGRAM, COMPLETE: ICD-10-PCS | Mod: S$GLB,,, | Performed by: INTERNAL MEDICINE

## 2022-12-12 PROCEDURE — 99499 RISK ADDL DX/OHS AUDIT: ICD-10-PCS | Mod: S$GLB,,, | Performed by: INTERNAL MEDICINE

## 2022-12-12 PROCEDURE — 93005 ELECTROCARDIOGRAM TRACING: CPT

## 2022-12-12 PROCEDURE — 99215 OFFICE O/P EST HI 40 MIN: CPT | Mod: 25,S$GLB,, | Performed by: INTERNAL MEDICINE

## 2022-12-12 PROCEDURE — 99499 UNLISTED E&M SERVICE: CPT | Mod: S$GLB,,, | Performed by: INTERNAL MEDICINE

## 2022-12-12 NOTE — PROGRESS NOTES
Subjective:     Kanu Carrero is a 72 y.o.male with hypertension, hypercholesterolemia and diabetes mellitus type 2. He is mildly obese. He has coronary artery disease with mild left main disease and a history of an intervention of the right coronary artery in 2008. He has left bundle branch block. He had moderate to severe aortic stenosis. In the fall of 2013 developed mild exertional chest pressure. He underwent cardiac catheterization on 1/30/2014 which revealed an aortic valve area of 1.1 cm2. No obstructive coronary artery disease was seen with an about 40% left main stenosis. There was mild left ventricular dysfunction. He underwent aortic valve replacement receiving a [employer] mechanical valve on 3/11/2014. He was begun on warfarin with anticoagulation managed by Dr. Delgadillo. He was diagnosed with iron deficiency anemia in 10/2016. He underwent an esophagogastroduodenoscopy and colonoscopy that he said were negative. It was decided to discontine aspirin that he was then taking in addition to being anticoagulated. On 11/4/2022 he began empagliflozin. No exertional chest discomfort or exertional dyspnea. No palpitations or weak spells. No bleeding. No issues with any of his prescribed medications. Feeling well overall. On 12/19/2022 he has plans for arthroscopic surgery of the left knee.      Coronary Artery Disease  Presents for follow-up visit. The disease course has been stable. The condition has lasted for  years. Pertinent negatives include no chest pain, chest pressure, chest tightness, dizziness, leg swelling, muscle weakness, palpitations, shortness of breath or weight gain. Risk factors include diabetes, hyperlipidemia, hypertension and obesity. Risk factors do not include decreased physical activity. His past medical history is significant for CHF. There is no history of arrhythmia. The symptoms have been stable.   Congestive Heart Failure  Presents for follow-up visit. The disease course has been  stable. The condition has lasted for  years. Pertinent negatives include no abdominal pain, chest pain, chest pressure, claudication, edema, fatigue, muscle weakness, near-syncope, nocturia, orthopnea, palpitations, paroxysmal nocturnal dyspnea, shortness of breath or unexpected weight change. The symptoms have been stable. His past medical history is significant for CAD, DM and HTN. There is no history of arrhythmia or chronic lung disease.   Hypertension  This is a chronic problem. The current episode started more than 1 year ago. The problem is unchanged. The problem is controlled (usually 115-125/55-60 mmHg at home). Pertinent negatives include no anxiety, blurred vision, chest pain, headaches, malaise/fatigue, neck pain, orthopnea, palpitations, peripheral edema, PND, shortness of breath or sweats. There is no history of chronic renal disease.   Hyperlipidemia  This is a chronic problem. The current episode started more than 1 year ago. The problem is controlled. Recent lipid tests were reviewed and are variable. Exacerbating diseases include diabetes and obesity. He has no history of chronic renal disease, hypothyroidism, liver disease or nephrotic syndrome. Pertinent negatives include no chest pain, focal sensory loss, focal weakness, leg pain, myalgias or shortness of breath.     Review of Systems   Constitutional: Negative for chills, fatigue, fever, malaise/fatigue, unexpected weight change and weight gain.   HENT:  Negative for hoarse voice and nosebleeds.    Eyes:  Negative for blurred vision, pain, vision loss in left eye and vision loss in right eye.   Cardiovascular:  Negative for chest pain, claudication, dyspnea on exertion, irregular heartbeat, leg swelling, near-syncope, orthopnea, palpitations, paroxysmal nocturnal dyspnea and syncope.   Respiratory:  Negative for chest tightness, cough, hemoptysis, shortness of breath and wheezing.    Endocrine: Negative for cold intolerance and heat  intolerance.   Hematologic/Lymphatic: Negative for bleeding problem. Does not bruise/bleed easily.   Skin:  Negative for color change and rash.   Musculoskeletal:  Negative for back pain, falls, gout, muscle weakness, myalgias and neck pain.   Gastrointestinal:  Negative for abdominal pain, heartburn, hematemesis, hematochezia, hemorrhoids, jaundice, melena, nausea and vomiting.   Genitourinary:  Negative for dysuria, hematuria and nocturia.   Neurological:  Negative for dizziness, focal weakness, headaches, light-headedness, loss of balance, numbness, tremors and vertigo.   Psychiatric/Behavioral:  Negative for altered mental status, depression and memory loss. The patient is not nervous/anxious.    Allergic/Immunologic: Negative for hives and persistent infections.       Current Outpatient Medications on File Prior to Visit   Medication Sig Dispense Refill    amLODIPine (NORVASC) 10 MG tablet Take 1 tablet (10 mg total) by mouth once daily. 90 tablet 3    chlorthalidone (HYGROTEN) 25 MG Tab Take 1 tablet (25 mg total) by mouth once daily. 90 tablet 3    empagliflozin (JARDIANCE) 10 mg tablet Take 1 tablet (10 mg total) by mouth once daily. 90 tablet 3    EScitalopram oxalate (LEXAPRO) 10 MG tablet TAKE 1 TABLET BY MOUTH DAILY 90 tablet 4    ezetimibe (ZETIA) 10 mg tablet Take 1 tablet (10 mg total) by mouth once daily. 90 tablet 3    ferrous sulfate (FEOSOL) 325 mg (65 mg iron) Tab tablet Take 1 tablet (325 mg total) by mouth daily with breakfast. 90 tablet 3    levocetirizine (XYZAL) 5 MG tablet Take 1 tablet (5 mg total) by mouth every evening. 90 tablet 3    MULTIVITAMIN W-MINERALS/LUTEIN (CENTRUM SILVER ORAL) Take by mouth.        omeprazole (PRILOSEC) 20 MG capsule Take 20 mg by mouth once daily.   11    ramipriL (ALTACE) 10 MG capsule Take 1 capsule (10 mg total) by mouth once daily. 90 capsule 3    rosuvastatin (CRESTOR) 20 MG tablet Take 1 tablet (20 mg total) by mouth once daily. 90 tablet 3    warfarin  "(JANTOVEN) 3 MG tablet DOSE TO BE ADJUSTED ACCORDING TO INTERNATIONAL NORMALIZED RATIO. 140 tablet 3     No current facility-administered medications on file prior to visit.       /62 (BP Location: Left arm, Patient Position: Sitting, BP Method: Large (Manual))   Pulse 74   Ht 5' 9" (1.753 m)   Wt 90.3 kg (199 lb 1.2 oz)   SpO2 98%   BMI 29.40 kg/m²       Objective:     Physical Exam  Constitutional:       General: He is not in acute distress.     Appearance: Normal appearance. He is well-developed. He is not ill-appearing or diaphoretic.   HENT:      Head: Normocephalic and atraumatic.      Nose: Nose normal.   Eyes:      General:         Right eye: No discharge.         Left eye: No discharge.      Conjunctiva/sclera:      Right eye: Right conjunctiva is not injected.      Left eye: Left conjunctiva is not injected.      Pupils: Pupils are equal.      Right eye: Pupil is round.      Left eye: Pupil is round.   Neck:      Thyroid: No thyroid mass or thyromegaly.      Vascular: No carotid bruit or JVD.   Cardiovascular:      Rate and Rhythm: Regular rhythm. Bradycardia present. No extrasystoles are present.     Chest Wall: PMI is not displaced.      Pulses:           Radial pulses are 2+ on the right side and 2+ on the left side.        Femoral pulses are 2+ on the right side and 2+ on the left side.       Dorsalis pedis pulses are 2+ on the right side and 2+ on the left side.        Posterior tibial pulses are 2+ on the right side and 2+ on the left side.      Heart sounds: S1 normal. Murmur heard.   Harsh midsystolic murmur is present with a grade of 3/6 at the upper right sternal border. Mechanical S2.     No gallop.      Comments: Mechanical S2.  Pulmonary:      Effort: Pulmonary effort is normal.      Breath sounds: Normal breath sounds.   Abdominal:      Palpations: Abdomen is soft.      Tenderness: There is no abdominal tenderness.   Musculoskeletal:      Cervical back: Neck supple.      Right " ankle: Swelling present. No deformity or ecchymosis.      Left ankle: Swelling present. No deformity or ecchymosis.   Lymphadenopathy:      Head:      Right side of head: No submandibular adenopathy.      Left side of head: No submandibular adenopathy.      Cervical: No cervical adenopathy.   Skin:     General: Skin is warm and dry.   Neurological:      General: No focal deficit present.      Mental Status: He is alert and oriented to person, place, and time. He is not disoriented.      Cranial Nerves: No cranial nerve deficit.   Psychiatric:         Attention and Perception: Attention and perception normal.         Mood and Affect: Mood and affect normal.         Speech: Speech normal.         Behavior: Behavior normal.         Thought Content: Thought content normal.         Cognition and Memory: Cognition and memory normal.         Judgment: Judgment normal.        Assessment:     1. Aortic valve disease    2. History of heart valve replacement    3. Chronic anticoagulation    4. Coronary artery disease involving native coronary artery of native heart without angina pectoris    5. History of coronary artery stent placement    6. Heart failure, systolic, chronic    7. Left bundle branch block    8. Primary hypertension    9. Hypercholesterolemia    10. Type 2 diabetes mellitus with other circulatory complication, without long-term current use of insulin    11. Overweight    12. Gastroesophageal reflux disease without esophagitis    13. Pre-operative cardiovascular examination        Plan:     1. Aortic Valve Disease              5/22/2013: Echo: Mild AS 3.0 m/s.   Fall 2013: Mild to moderate exertional chest pressure.   1/21/2014: Echo: Normal LV size with low normal LV function. EF 50-55%. LBBB. Moderate AS - 3.2 m/s - 1.2 cm2. Mild to moderate MR.              1/21/2014: Carotid Duplex: Mild plaquing.              1/30/2014: [facility] [place]: Cath: Severe aortic stenosis - 1.1 cm2. Mean gradient 47 mm Hg. Mild CAD. Mild LV  dysfunction with EF 50%.              3/11/2014: [facility]: AVR: [employer] Chris Mechanical Valve - 23 mm.   5/28/2014: Echo: Mechanical aortic valve - 2.4 m/s - mild AR.   5/25/2021: Echo: Mechanical AVR - fine - MG 16 mmHg - DI 0.53.   On warfarin.   Knows about endocarditis prophylaxis.   Stable.    2. Chronic Anticoagulation   3/2014: Began warfarin for mechanical aortic valve. Goal INR 2-3. Anticoagulation managed by Dr. Delgadillo.    2018: Aspirin 81 mg was discontinued due to iron deficiency anemia.   On warfarin.   INR followed.   No aspirin or NSAIDs.    No obvious bleeding.    3. Coronary Artery Disease   2008: [facility]: Cath: RCA: Stent.   6/8/2010: [facility] [place]: Cath: LM 40%. RCA: Stent patent. EF 40-45%.              No aspirin as he has iron deficiency anemia and is anticoagulated.              Stable.    4. Heart Failure, Systolic and Diastolic, Chronic   2005: Diagnosed. EF 40-45%.   5/22/2013: Echo: EF 50-55%.   5/28/2014: Echo: Normal left ventricular size and function. Mild LVH. Mildly dilated LA. Mechanical aortic valve - 2.4 m/s - mild AR.   9/8/2016: Echo: Normal left ventricular size and systolic function. Mild LVH. Moderate diastolic dysfunction. LBBB. Moderately dilated LA. Mechanical AVR fine - 2.7 m/s.   5/25/2021: Echo: The left ventricle is mildly dilated with low normal systolic function. EF 50%. The mid anteroseptal wall and apical septum are moderately hypokinetic. The basal inferolateral wall is severely hypokinetic. The remainder contract well. LBBB. Mild diastolic dysfunction. Mildly dilated LA. Mechanical AVR - fine - MG 16 mmHg - DI 0.53.   3/1/2019: Metoprolol was discontinued due to HR of 46 bpm.    11/4/2022: Empagliflozin 10 mg Q24 was begun.              On empagliflozin 10 mg Q24, ramipril 10 mg Q24 and chlorthalidone 25 mg Q24.   Well compensated.   5/2023: Plan next Echo. Then every few years.     5. Left Bundle Branch Block              2005: Diagnosed.   9/8/2017: SB. LBBB.   ms.   10/19/2018: SB 52. LBBB with  msec.   3/1/2019: HR 46 bpm.   7/5/2019: HR 68 bpm.   5/20/2021: HR 67.  ms. LBBB with  ms.   7/1/2022: ECG: LBBB.  ms.              Stable.                6. Hypertension              2005: Diagnosed.   3/1/2019: Metoprolol was discontinued due to HR of 46 bpm.    On amlodipine 10 mg Q24, ramipril 10 mg Q24 and chlorthalidone 25 mg Q24.   Leg edema resolved with diuretic.   Keeping log at home.   Well controlled.     7. Hypercholesterolemia   2007: Began statin.              On atorvastatin 80 mg Q24.   12/16/2016: Chol 134. HDL 35. LDL 74. .   May have had some muscle aches while on atorvastatin.    On rosuvastatin 10 mg Q24.   9/13/2017: Chol 158. HDL 33. LDL 96. .   On rosuvastatin 20 mg Q24.   9/14/2018: Chol 153. HDL 35. LDL 92. .   2/20/2019: Chol 151. HDL 35. LDL 86. .   7/9/2020: Chol 169. HDL 40. LDL 95. .   On rosuvastatin 20 mg Q24.   9/16/2020: Ezetimibe 10 mg Q24 was begun.   On rosuvastatin 20 mg Q24 and ezetimibe 10 mg Q24    11/18/2020: Chol 109. HDL 34. LDL 50. .   6/3/2021: Chol 112. HDL 33. LDL 49. .   6/3/2022: Chol 113. HDL 41. LDL 48. .   On rosuvastatin 20 mg Q24 and ezetimibe 10 mg Q24    Very favorable lipid panel.     8. Diabetes Mellitus, Type 2   3/2014: Diagnosed. Complications: CAD. Medications: Oral agent.   11/4/2022: Empagliflozin 10 mg Q24 was begun for HF. Metformin 500 mg Q12 was discontinued.    6/3/2022: HgbA1C 5.8%.   On empagliflozin 10 mg Q24 .    Well controlled.     9. Mild Obesity   5/5/2017: Weight 97 kg. BMI 32.   9/8/2017: Weight 95 kg. BMI 31.   6/1/2018: Weight 92 kg. BMI 30.   10/19/2018: Weight 89 kg. BMI 29.   3/1/2019: Weight 94 kg. BMI 31.   11/1/2019: Weight 92 kg. BMI 30.   9/16/2020: Weight 96 kg. BMI 31.    Encouraged to lose weight.    10. Gastroesophageal Reflux Disease   2008: Diagnosed.   On omeprazole 20 mg Q24.    11. Anemia   10/2016:  Diagnosed with iron deficiency anemia.   10/4/2016: EGD & Colonoscopy: Negative.   Receiving iron.    12. Primary Care              Dr. Olvin Hanley.    13. Pre Operative Cardiovascular Evaluation   12/19/2022: Plan is arthroscopic surgery of left knee.   12/15/2022: Last dose of warfarin.   12/19/2022: Resume warfarin. Take 9 mg at 5 pm that day then usual dose that is 4.5 mg 4/7 & 3 mg 3/7.  12/22/2022: Next INR.    F/u 2 months.    Sharad Delgadillo M.D.

## 2022-12-22 ENCOUNTER — PES CALL (OUTPATIENT)
Dept: ADMINISTRATIVE | Facility: CLINIC | Age: 72
End: 2022-12-22
Payer: MEDICARE

## 2022-12-26 ENCOUNTER — HOSPITAL ENCOUNTER (EMERGENCY)
Facility: HOSPITAL | Age: 72
Discharge: HOME | End: 2022-12-26
Attending: EMERGENCY MEDICINE
Payer: MEDICARE

## 2022-12-26 VITALS
OXYGEN SATURATION: 96 % | HEART RATE: 79 BPM | SYSTOLIC BLOOD PRESSURE: 123 MMHG | RESPIRATION RATE: 16 BRPM | TEMPERATURE: 99 F | DIASTOLIC BLOOD PRESSURE: 60 MMHG

## 2022-12-26 DIAGNOSIS — G89.18 POST-OPERATIVE PAIN: Primary | ICD-10-CM

## 2022-12-26 DIAGNOSIS — M25.062 HEMARTHROSIS OF LEFT KNEE: ICD-10-CM

## 2022-12-26 LAB
BASOPHILS # BLD AUTO: 0.05 K/UL (ref 0–0.2)
BASOPHILS NFR BLD: 0.4 % (ref 0–1.9)
DIFFERENTIAL METHOD: ABNORMAL
EOSINOPHIL # BLD AUTO: 0 K/UL (ref 0–0.5)
EOSINOPHIL NFR BLD: 0 % (ref 0–8)
ERYTHROCYTE [DISTWIDTH] IN BLOOD BY AUTOMATED COUNT: 14.6 % (ref 11.5–14.5)
HCT VFR BLD AUTO: 36.9 % (ref 40–54)
HGB BLD-MCNC: 12 G/DL (ref 14–18)
IMM GRANULOCYTES # BLD AUTO: 0.06 K/UL (ref 0–0.04)
IMM GRANULOCYTES NFR BLD AUTO: 0.5 % (ref 0–0.5)
INR PPP: 3.4 (ref 0.8–1.2)
LYMPHOCYTES # BLD AUTO: 1.1 K/UL (ref 1–4.8)
LYMPHOCYTES NFR BLD: 8.5 % (ref 18–48)
MCH RBC QN AUTO: 27.6 PG (ref 27–31)
MCHC RBC AUTO-ENTMCNC: 32.5 G/DL (ref 32–36)
MCV RBC AUTO: 85 FL (ref 82–98)
MONOCYTES # BLD AUTO: 1.2 K/UL (ref 0.3–1)
MONOCYTES NFR BLD: 9.2 % (ref 4–15)
NEUTROPHILS # BLD AUTO: 10.2 K/UL (ref 1.8–7.7)
NEUTROPHILS NFR BLD: 81.4 % (ref 38–73)
NRBC BLD-RTO: 0 /100 WBC
PLATELET # BLD AUTO: 411 K/UL (ref 150–450)
PMV BLD AUTO: 9.2 FL (ref 9.2–12.9)
PROTHROMBIN TIME: 33.2 SEC (ref 9–12.5)
RBC # BLD AUTO: 4.34 M/UL (ref 4.6–6.2)
WBC # BLD AUTO: 12.56 K/UL (ref 3.9–12.7)

## 2022-12-26 PROCEDURE — 96372 THER/PROPH/DIAG INJ SC/IM: CPT | Performed by: EMERGENCY MEDICINE

## 2022-12-26 PROCEDURE — 85610 PROTHROMBIN TIME: CPT | Mod: HCNC | Performed by: EMERGENCY MEDICINE

## 2022-12-26 PROCEDURE — 99284 EMERGENCY DEPT VISIT MOD MDM: CPT | Mod: HCNC

## 2022-12-26 PROCEDURE — 25000003 PHARM REV CODE 250: Mod: HCNC | Performed by: EMERGENCY MEDICINE

## 2022-12-26 PROCEDURE — 63600175 PHARM REV CODE 636 W HCPCS: Mod: HCNC | Performed by: EMERGENCY MEDICINE

## 2022-12-26 PROCEDURE — 85025 COMPLETE CBC W/AUTO DIFF WBC: CPT | Mod: HCNC | Performed by: EMERGENCY MEDICINE

## 2022-12-26 RX ORDER — CHOLECALCIFEROL (VITAMIN D3) 25 MCG
1000 TABLET ORAL DAILY
COMMUNITY

## 2022-12-26 RX ORDER — MORPHINE SULFATE 4 MG/ML
4 INJECTION, SOLUTION INTRAMUSCULAR; INTRAVENOUS
Status: COMPLETED | OUTPATIENT
Start: 2022-12-26 | End: 2022-12-26

## 2022-12-26 RX ORDER — HYDROCODONE BITARTRATE AND ACETAMINOPHEN 7.5; 325 MG/1; MG/1
1 TABLET ORAL
COMMUNITY
End: 2023-06-06

## 2022-12-26 RX ORDER — PNV NO.95/FERROUS FUM/FOLIC AC 28MG-0.8MG
100 TABLET ORAL DAILY
COMMUNITY

## 2022-12-26 RX ORDER — ACETAMINOPHEN 500 MG
500 TABLET ORAL EVERY 6 HOURS PRN
COMMUNITY

## 2022-12-26 RX ORDER — HYDROCODONE BITARTRATE AND ACETAMINOPHEN 10; 325 MG/1; MG/1
1 TABLET ORAL
Qty: 12 TABLET | Refills: 0 | Status: SHIPPED | OUTPATIENT
Start: 2022-12-26 | End: 2023-06-06

## 2022-12-26 RX ORDER — METHYLPREDNISOLONE 4 MG/1
TABLET ORAL
COMMUNITY
Start: 2022-09-15 | End: 2022-12-26

## 2022-12-26 RX ORDER — ONDANSETRON 4 MG/1
4 TABLET, ORALLY DISINTEGRATING ORAL
Status: COMPLETED | OUTPATIENT
Start: 2022-12-26 | End: 2022-12-26

## 2022-12-26 RX ADMIN — MORPHINE SULFATE 4 MG: 4 INJECTION INTRAVENOUS at 11:12

## 2022-12-26 RX ADMIN — ONDANSETRON 4 MG: 4 TABLET, ORALLY DISINTEGRATING ORAL at 11:12

## 2022-12-26 NOTE — ED PROVIDER NOTES
Encounter Date: 12/26/2022    SCRIBE #1 NOTE: I, Hiro Sorensen, am scribing for, and in the presence of,  Florence Malagon MD. I have scribed the following portions of the note - Other sections scribed: HPI, ROS, Physical Exam.     History     Chief Complaint   Patient presents with    Post-op Problem     Pt presents to ED today reports left knee scope and repair on Thursday, c/o swelling and pain to knee was advised by surgeon to be seen in ED      Kanu Carrero is a 72 y.o. male who presents to the ED with a post-op problem. The patient reports he had a left knee scope and repair performed one week ago. He now complains of worsening swelling and pain to the knee that started 4 days ago. He reports difficulty bearing weight on the leg due to pain. He states his surgeon is [place] of [place] and referred him to the nearest ED for further evaluation.     The history is provided by the patient. No  was used.   Review of patient's allergies indicates:   Allergen Reactions    Beta-blockers (beta-adrenergic blocking agts) Other (See Comments)      3/1/2019: Metoprolol was discontinued due to HR 46 bpm.      Past Medical History:   Diagnosis Date    Aortic valve disorders 6/7/2013 5/22/2013: Echo. [facility]. Mild AS 3.0m/s.    Back pain     CAD (coronary artery disease)     Chronic anticoagulation 3/12/2014    Warfarin for mechanical aortic valve. Goal INR 2-3. Anticoagulation managed by [facility].     Coronary artery disease 6/7/2013 6/8/2010. Cath. [facility]. LM 40%. Patent RCA stent. EF 40-45%.    Diabetes mellitus, type 2 4/4/2014    Diagnosed 3/2014.    Gastroesophageal reflux disease 8/19/2016    2008: Diagnosed.    Heart failure, systolic, chronic 6/7/2013    Hx of EF 40-45%. 5/22/2013: Echo. [facility]. EF 50-55%.    History of heart valve replacement 3/11/2014    3/11/2014: AVR, [facility] [place]: [facility]. Chris 23 mm Mechanical Valve.    History of PTCA 6/7/2013 6/8/2010. Cath. [facility]. LM 40%. Patent RCA  stent. EF 40-45%. 2008: Stent RCA.    Hypercholesterolemia 6/7/2013    Hyperlipidemia     Hypertension     Hypertension, malignant 6/6/2014    Diagnosed 2005.    LBBB (left bundle branch block) 6/7/2013    Diagnosed 2005.    Mild obesity 5/5/2017 5/5/2017: Weight 97 kg. BMI 32.    Peptic ulcer      Past Surgical History:   Procedure Laterality Date    CORONARY STENT PLACEMENT  2008    [facility]    HAND SURGERY  1957    first digit of third & fourth fingers of right hand removed    TONSILLECTOMY, ADENOIDECTOMY      VASECTOMY       No family history on file.  Social History     Tobacco Use    Smoking status: Never    Smokeless tobacco: Never   Substance Use Topics    Alcohol use: No    Drug use: No     Review of Systems   Constitutional:  Negative for fever.   HENT:  Negative for sore throat.    Respiratory:  Negative for shortness of breath.    Cardiovascular:  Negative for chest pain.   Gastrointestinal:  Negative for nausea.   Genitourinary:  Negative for dysuria.   Musculoskeletal:  Positive for arthralgias and joint swelling. Negative for back pain.        Knee pain and swelling (left)   Skin:  Negative for rash.   Neurological:  Negative for weakness.   Hematological:  Does not bruise/bleed easily.       Physical Exam     Initial Vitals [12/26/22 1017]   BP Pulse Resp Temp SpO2   (!) 142/60 79 18 98.8 °F (37.1 °C) 98 %      MAP       --         Physical Exam    Nursing note and vitals reviewed.  Constitutional: He appears well-developed and well-nourished.   HENT:   Mouth/Throat: Oropharynx is clear and moist.   Eyes: Pupils are equal, round, and reactive to light.   Neck: Neck supple.   Normal range of motion.  Pulmonary/Chest: Breath sounds normal.   Abdominal: Abdomen is soft. There is no abdominal tenderness.   Musculoskeletal:         General: No edema.      Cervical back: Normal range of motion and neck supple.      Comments: Left knee effusion noted.  There is no erythema there is no drainage from the  wounds.  There is no evidence of infection at this time.  No popliteal tenderness or swelling.  Lower leg is neurovascularly intact.     Neurological: He is alert and oriented to person, place, and time.   Skin: Skin is warm and dry.       ED Course   Procedures  Labs Reviewed   CBC W/ AUTO DIFFERENTIAL - Abnormal; Notable for the following components:       Result Value    RBC 4.34 (*)     Hemoglobin 12.0 (*)     Hematocrit 36.9 (*)     RDW 14.6 (*)     Gran # (ANC) 10.2 (*)     Immature Grans (Abs) 0.06 (*)     Mono # 1.2 (*)     Gran % 81.4 (*)     Lymph % 8.5 (*)     All other components within normal limits   PROTIME-INR - Abnormal; Notable for the following components:    Prothrombin Time 33.2 (*)     INR 3.4 (*)     All other components within normal limits          Imaging Results    None          Medications   morphine injection 4 mg (4 mg Intramuscular Given 12/26/22 1144)   ondansetron disintegrating tablet 4 mg (4 mg Oral Given 12/26/22 1144)     Medical Decision Making:   Clinical Tests:   Lab Tests: Ordered and Reviewed  ED Management:  The patient was offered admission for pain control and repeat INR in the morning.  He refuses.  He states he would like to go home.  I will give him an Ace wrap for his knee, a urine also he does not have to get up much to urinate and an ice pack.  He will have his INR checked tomorrow at the lab and if it is not available, he can return to the emergency department.        Scribe Attestation:   Scribe #1: I performed the above scribed service and the documentation accurately describes the services I performed. I attest to the accuracy of the note.      ED Course as of 12/26/22 1327   Mon Dec 26, 2022   1316 The patient takes Coumadin 4.5 mg 4 times a week and 3 mg 3 times a week.  The case was discussed with the patient's orthopedic surgeon, Dr. Mensah.  Recommends consulting Cardiology regarding the Coumadin dosage.  INR reviewed and is 3.4.  The Coumadin intake is  designed to keep the INR between 2 and 3.  Therefore he can hold his Coumadin tonight and have his INR checked tomorrow.  CBC reviewed.  Hemoglobin is 12, platelet count 411.   [ST]      ED Course User Index  [ST] Florence Malagon MD                 Clinical Impression:   Final diagnoses:  [G89.18] Post-operative pain (Primary)  [M25.062] Hemarthrosis of left knee        ED Disposition Condition    Discharge Stable          ED Prescriptions       Medication Sig Dispense Start Date End Date Auth. Provider    HYDROcodone-acetaminophen (NORCO)  mg per tablet Take 1 tablet by mouth every 4 to 6 hours as needed for Pain. 12 tablet 12/26/2022 -- Florence Malagon MD          Follow-up Information       Follow up With Specialties Details Why Contact Info    Bebeto Rodriguez III, MD Hand Surgery In 1 day  [address]  [address]  [place] [address]  [phone]          I, Florence Malagon, personally performed the services described in this documentation. All medical record entries made by the scribe were at my direction and in my presence.  I have reviewed the chart and agree that the record reflects my personal performance and is accurate and complete. Florence Malagon M.D. 1:19 PM12/26/2022      Florence Malagon MD  12/26/22 3680

## 2022-12-26 NOTE — ED NOTES
Ace wrap applied to left knee along with bag of ice per Dr. Malagon. Urinal at provided at bedside

## 2022-12-26 NOTE — ED NOTES
Patient received IM morphine for pain. Patient placed on O2 monitoring. Patient resting in stretcher, left leg extended and elevated on pillow, family @ bedside.

## 2022-12-26 NOTE — ED NOTES
Patient presents to ED from home with complaints to pain and swelling to his left knee. Patient states he had a surgical procedure done by Dr. Mensah on Monday 12/19/22. 4+ swelling noted to left knee. Patient able to wiggle toes, no discoloration noted, 2+ pedal pulse present. No drainage or odor noted to site. Site open to air and remains clean, dry, and intact. Patient states he us unable to walk on left leg. Patient states he takes warfarin daily.

## 2022-12-26 NOTE — ED NOTES
Patient rates his pain 5/10 (was previously 10/10) after receiving IM morphine for pain. Patient lying in bed, leg immobilized, family @ bedside. APOLINAR.

## 2022-12-26 NOTE — PHARMACY MED REC
"Admission Medication History     The home medication history was taken by Kera Slaughter CPhT.    Medication history obtained from, Patient Verified    You may go to "Admission" then "Reconcile Home Medications" tabs to review and/or act upon these items.     The home medication list has been updated by the Pharmacy department.   Please read ALL comments highlighted in yellow.   Please address this information as you see fit.    Feel free to contact us if you have any questions or require assistance.      The medications listed below were removed from the home medication list.  Please reorder if appropriate:  Patient reports no longer taking the following medication(s):  Xyzal 5 mg  Medrol Dosepack 4 mg      Kera Slaughter CPhT.  Ext [phone]               .        "

## 2022-12-28 ENCOUNTER — LAB VISIT (OUTPATIENT)
Dept: LAB | Facility: HOSPITAL | Age: 72
End: 2022-12-28
Attending: INTERNAL MEDICINE
Payer: MEDICARE

## 2022-12-28 ENCOUNTER — TELEPHONE (OUTPATIENT)
Dept: CARDIOLOGY | Facility: CLINIC | Age: 72
End: 2022-12-28
Payer: MEDICARE

## 2022-12-28 DIAGNOSIS — Z79.01 CHRONIC ANTICOAGULATION: ICD-10-CM

## 2022-12-28 DIAGNOSIS — Z95.2 HISTORY OF HEART VALVE REPLACEMENT: ICD-10-CM

## 2022-12-28 LAB
INR PPP: 3.2 (ref 0.8–1.2)
PROTHROMBIN TIME: 31.4 SEC (ref 9–12.5)

## 2022-12-28 PROCEDURE — 36415 COLL VENOUS BLD VENIPUNCTURE: CPT | Mod: HCNC,PO | Performed by: INTERNAL MEDICINE

## 2022-12-28 PROCEDURE — 85610 PROTHROMBIN TIME: CPT | Mod: HCNC | Performed by: INTERNAL MEDICINE

## 2022-12-28 NOTE — TELEPHONE ENCOUNTER
Pt recently had knee scoped. He went to the ER on 12/26/22 c/o swelling to knee and pain. On 12/27/22 saw ortho drained knee pt has been off warfarin since. Pt took 3mg today. advised to check INR ASAP.    Ortho would like to prescribe Celebrex if okay.     Please advise.

## 2022-12-29 NOTE — TELEPHONE ENCOUNTER
Patient advised to check with ortho doctor to advise if okay to resume regular dose of warfarin. If so, check INR in one week.      ----- Message from Sharad Delgadillo MD sent at 12/28/2022  9:53 PM CST -----  Continue the current dosing of warfarin.    Recheck INR in 1 week.    Sharad Delgadillo M.D.

## 2023-01-06 ENCOUNTER — LAB VISIT (OUTPATIENT)
Dept: LAB | Facility: HOSPITAL | Age: 73
End: 2023-01-06
Attending: INTERNAL MEDICINE
Payer: MEDICARE

## 2023-01-06 DIAGNOSIS — Z79.01 CHRONIC ANTICOAGULATION: ICD-10-CM

## 2023-01-06 DIAGNOSIS — Z95.2 HISTORY OF HEART VALVE REPLACEMENT: ICD-10-CM

## 2023-01-06 LAB
INR PPP: 3.5 (ref 0.8–1.2)
PROTHROMBIN TIME: 34 SEC (ref 9–12.5)

## 2023-01-06 PROCEDURE — 36415 COLL VENOUS BLD VENIPUNCTURE: CPT | Mod: HCNC,PO | Performed by: INTERNAL MEDICINE

## 2023-01-06 PROCEDURE — 85610 PROTHROMBIN TIME: CPT | Mod: HCNC | Performed by: INTERNAL MEDICINE

## 2023-01-10 ENCOUNTER — TELEPHONE (OUTPATIENT)
Dept: CARDIOLOGY | Facility: CLINIC | Age: 73
End: 2023-01-10
Payer: MEDICARE

## 2023-01-10 NOTE — TELEPHONE ENCOUNTER
Patient notified and verbalized understanding.      ----- Message from Sharad Delgadillo MD sent at 1/9/2023  4:47 PM CST -----  Current dose: 4.5 mg 4/7 & 3 mg 3/7.     Reduce: 4.5 mg 2/7 & 3 mg 5/7.     INR in 2 weeks.     Sharad Delgadillo M.D.  ----- Message -----  From: Deb Valdivia MA  Sent: 1/9/2023  10:01 AM CST  To: MD Dr. Elie Mendiola: the current dose:  4.5 mg 4/7 & 3 mg 3/7.  Please advise.  ----- Message -----  From: Sharad Delgadillo MD  Sent: 1/6/2023   7:11 PM CST  To: Deb Valdivia MA    Current dose: 4.5 mg 5/7 & 3 mg 2/7.     Reduce: 4.5 mg 4/7 & 3 mg 3/7.     INR in 2 weeks.     Sharad Delgadillo M.D.

## 2023-01-19 ENCOUNTER — PATIENT MESSAGE (OUTPATIENT)
Dept: ADMINISTRATIVE | Facility: HOSPITAL | Age: 73
End: 2023-01-19
Payer: MEDICARE

## 2023-01-25 ENCOUNTER — LAB VISIT (OUTPATIENT)
Dept: LAB | Facility: HOSPITAL | Age: 73
End: 2023-01-25
Attending: INTERNAL MEDICINE
Payer: MEDICARE

## 2023-01-25 DIAGNOSIS — I35.9 AORTIC VALVE DISEASE: ICD-10-CM

## 2023-01-25 DIAGNOSIS — Z95.2 HISTORY OF HEART VALVE REPLACEMENT: ICD-10-CM

## 2023-01-25 DIAGNOSIS — Z79.01 CHRONIC ANTICOAGULATION: ICD-10-CM

## 2023-01-25 LAB
INR PPP: 2 (ref 0.8–1.2)
INR PPP: 2 (ref 0.8–1.2)
PROTHROMBIN TIME: 19.7 SEC (ref 9–12.5)
PROTHROMBIN TIME: 19.7 SEC (ref 9–12.5)

## 2023-01-25 PROCEDURE — 36415 COLL VENOUS BLD VENIPUNCTURE: CPT | Mod: HCNC,PO | Performed by: INTERNAL MEDICINE

## 2023-01-25 PROCEDURE — 85610 PROTHROMBIN TIME: CPT | Mod: HCNC | Performed by: INTERNAL MEDICINE

## 2023-01-26 ENCOUNTER — TELEPHONE (OUTPATIENT)
Dept: CARDIOLOGY | Facility: CLINIC | Age: 73
End: 2023-01-26
Payer: MEDICARE

## 2023-01-26 NOTE — TELEPHONE ENCOUNTER
Present warfarin dose:  4.5 mg 2/7 & 3 mg 5/7.    Patient notified and verbalized understanding.      ----- Message from Sharad Delgadillo MD sent at 1/25/2023  5:42 PM CST -----  Continue the current dosing of warfarin.    Recheck INR in 2 weeks.    Sharad Delgadillo M.D.

## 2023-02-06 ENCOUNTER — TELEPHONE (OUTPATIENT)
Dept: FAMILY MEDICINE | Facility: CLINIC | Age: 73
End: 2023-02-06

## 2023-02-06 DIAGNOSIS — R97.20 ELEVATED PSA, LESS THAN 10 NG/ML: Primary | ICD-10-CM

## 2023-02-06 NOTE — TELEPHONE ENCOUNTER
Slightly elevated prostate test.      Probably corresponding with your age.      I ordered a follow-up appointment with the urologist.

## 2023-02-07 ENCOUNTER — LAB VISIT (OUTPATIENT)
Dept: LAB | Facility: HOSPITAL | Age: 73
End: 2023-02-07
Attending: INTERNAL MEDICINE
Payer: MEDICARE

## 2023-02-07 DIAGNOSIS — I35.9 AORTIC VALVE DISEASE: ICD-10-CM

## 2023-02-07 DIAGNOSIS — Z95.2 HISTORY OF HEART VALVE REPLACEMENT: ICD-10-CM

## 2023-02-07 DIAGNOSIS — Z79.01 CHRONIC ANTICOAGULATION: ICD-10-CM

## 2023-02-07 LAB
INR PPP: 2 (ref 0.8–1.2)
INR PPP: 2 (ref 0.8–1.2)
PROTHROMBIN TIME: 19.9 SEC (ref 9–12.5)
PROTHROMBIN TIME: 19.9 SEC (ref 9–12.5)

## 2023-02-07 PROCEDURE — 85610 PROTHROMBIN TIME: CPT | Mod: HCNC | Performed by: INTERNAL MEDICINE

## 2023-02-07 PROCEDURE — 36415 COLL VENOUS BLD VENIPUNCTURE: CPT | Mod: HCNC,PO | Performed by: INTERNAL MEDICINE

## 2023-02-08 ENCOUNTER — TELEPHONE (OUTPATIENT)
Dept: CARDIOLOGY | Facility: CLINIC | Age: 73
End: 2023-02-08
Payer: MEDICARE

## 2023-02-08 NOTE — TELEPHONE ENCOUNTER
Present warfarin dose:  4.5 mg 2/7 & 3 mg 5/7.    Patient notified and verbalized understanding.        ----- Message from Sharad Delgadlilo MD sent at 2/7/2023  5:23 PM CST -----  Continue the current dosing of warfarin.    Recheck INR in 2 weeks.    Sharad Delgadillo M.D.

## 2023-02-09 DIAGNOSIS — Z00.00 ENCOUNTER FOR MEDICARE ANNUAL WELLNESS EXAM: ICD-10-CM

## 2023-02-15 ENCOUNTER — OFFICE VISIT (OUTPATIENT)
Dept: UROLOGY | Facility: CLINIC | Age: 73
End: 2023-02-15
Payer: MEDICARE

## 2023-02-15 VITALS
DIASTOLIC BLOOD PRESSURE: 74 MMHG | BODY MASS INDEX: 27.92 KG/M2 | HEIGHT: 69 IN | WEIGHT: 188.5 LBS | SYSTOLIC BLOOD PRESSURE: 122 MMHG | HEART RATE: 71 BPM

## 2023-02-15 DIAGNOSIS — R97.20 ELEVATED PSA, LESS THAN 10 NG/ML: ICD-10-CM

## 2023-02-15 DIAGNOSIS — N40.1 LOWER URINARY TRACT SYMPTOMS DUE TO BENIGN PROSTATIC HYPERPLASIA: Primary | ICD-10-CM

## 2023-02-15 PROCEDURE — 3074F PR MOST RECENT SYSTOLIC BLOOD PRESSURE < 130 MM HG: ICD-10-PCS | Mod: HCNC,CPTII,S$GLB, | Performed by: UROLOGY

## 2023-02-15 PROCEDURE — 1159F PR MEDICATION LIST DOCUMENTED IN MEDICAL RECORD: ICD-10-PCS | Mod: HCNC,CPTII,S$GLB, | Performed by: UROLOGY

## 2023-02-15 PROCEDURE — 3078F PR MOST RECENT DIASTOLIC BLOOD PRESSURE < 80 MM HG: ICD-10-PCS | Mod: HCNC,CPTII,S$GLB, | Performed by: UROLOGY

## 2023-02-15 PROCEDURE — 1101F PT FALLS ASSESS-DOCD LE1/YR: CPT | Mod: HCNC,CPTII,S$GLB, | Performed by: UROLOGY

## 2023-02-15 PROCEDURE — 1126F AMNT PAIN NOTED NONE PRSNT: CPT | Mod: HCNC,CPTII,S$GLB, | Performed by: UROLOGY

## 2023-02-15 PROCEDURE — 1159F MED LIST DOCD IN RCRD: CPT | Mod: HCNC,CPTII,S$GLB, | Performed by: UROLOGY

## 2023-02-15 PROCEDURE — 1126F PR PAIN SEVERITY QUANTIFIED, NO PAIN PRESENT: ICD-10-PCS | Mod: HCNC,CPTII,S$GLB, | Performed by: UROLOGY

## 2023-02-15 PROCEDURE — 99204 OFFICE O/P NEW MOD 45 MIN: CPT | Mod: HCNC,S$GLB,, | Performed by: UROLOGY

## 2023-02-15 PROCEDURE — 3288F FALL RISK ASSESSMENT DOCD: CPT | Mod: HCNC,CPTII,S$GLB, | Performed by: UROLOGY

## 2023-02-15 PROCEDURE — 3008F BODY MASS INDEX DOCD: CPT | Mod: HCNC,CPTII,S$GLB, | Performed by: UROLOGY

## 2023-02-15 PROCEDURE — 99999 PR PBB SHADOW E&M-EST. PATIENT-LVL IV: CPT | Mod: PBBFAC,HCNC,, | Performed by: UROLOGY

## 2023-02-15 PROCEDURE — 1157F ADVNC CARE PLAN IN RCRD: CPT | Mod: HCNC,CPTII,S$GLB, | Performed by: UROLOGY

## 2023-02-15 PROCEDURE — 3074F SYST BP LT 130 MM HG: CPT | Mod: HCNC,CPTII,S$GLB, | Performed by: UROLOGY

## 2023-02-15 PROCEDURE — 3008F PR BODY MASS INDEX (BMI) DOCUMENTED: ICD-10-PCS | Mod: HCNC,CPTII,S$GLB, | Performed by: UROLOGY

## 2023-02-15 PROCEDURE — 1101F PR PT FALLS ASSESS DOC 0-1 FALLS W/OUT INJ PAST YR: ICD-10-PCS | Mod: HCNC,CPTII,S$GLB, | Performed by: UROLOGY

## 2023-02-15 PROCEDURE — 1157F PR ADVANCE CARE PLAN OR EQUIV PRESENT IN MEDICAL RECORD: ICD-10-PCS | Mod: HCNC,CPTII,S$GLB, | Performed by: UROLOGY

## 2023-02-15 PROCEDURE — 3078F DIAST BP <80 MM HG: CPT | Mod: HCNC,CPTII,S$GLB, | Performed by: UROLOGY

## 2023-02-15 PROCEDURE — 99999 PR PBB SHADOW E&M-EST. PATIENT-LVL IV: ICD-10-PCS | Mod: PBBFAC,HCNC,, | Performed by: UROLOGY

## 2023-02-15 PROCEDURE — 99204 PR OFFICE/OUTPT VISIT, NEW, LEVL IV, 45-59 MIN: ICD-10-PCS | Mod: HCNC,S$GLB,, | Performed by: UROLOGY

## 2023-02-15 PROCEDURE — 3288F PR FALLS RISK ASSESSMENT DOCUMENTED: ICD-10-PCS | Mod: HCNC,CPTII,S$GLB, | Performed by: UROLOGY

## 2023-02-15 RX ORDER — TAMSULOSIN HYDROCHLORIDE 0.4 MG/1
0.4 CAPSULE ORAL DAILY
Qty: 90 CAPSULE | Refills: 3 | Status: SHIPPED | OUTPATIENT
Start: 2023-02-15 | End: 2024-01-29 | Stop reason: SDUPTHER

## 2023-02-15 NOTE — PROGRESS NOTES
[facility]   Clinic Note    SUBJECTIVE:     Chief Complaint   Patient presents with    Other     Elevated PSA        Referral from: Olvin Hanley*.    History of Present Illness:  Kanu Carrero is a 72 y.o. male who presents to clinic for evaluation of elevated PSA.      Patient here for evaluation of elevated PSA.  Patient is unsure what his PSA had been previously.  His PSA today is 5.8.  Does report bothersome lower urinary tract symptoms including nocturia every 2 hours, urgency frequency weak stream and incomplete emptying.  Does not take any medications for this.  He is on chronic anticoagulation with warfarin due to previous valve replacements.    PSA:  Lab Results   Component Value Date    PSA 5.8 (H) 12/06/2022       Previously abnormal PSA: no.   Previous biopsy: no.   Family history of prostate cancer: no.      Patient endorses no additional complaints at this time.    Anticoagulation/Antiplatelets:  Yes; Warfarin    Past Medical History:   Diagnosis Date    Aortic valve disorders 6/7/2013 5/22/2013: Echo. [facility]. Mild AS 3.0m/s.    Back pain     CAD (coronary artery disease)     Chronic anticoagulation 3/12/2014    Warfarin for mechanical aortic valve. Goal INR 2-3. Anticoagulation managed by [facility].     Coronary artery disease 6/7/2013 6/8/2010. Cath. [facility]. LM 40%. Patent RCA stent. EF 40-45%.    Diabetes mellitus, type 2 4/4/2014    Diagnosed 3/2014.    Gastroesophageal reflux disease 8/19/2016 2008: Diagnosed.    Heart failure, systolic, chronic 6/7/2013    Hx of EF 40-45%. 5/22/2013: Echo. [facility]. EF 50-55%.    History of heart valve replacement 3/11/2014    [place] [facility] [place] [facility] [place].    History of PTCA 6/7/2013 6/8/2010. Cath. [facility]. LM 40%. Patent RCA stent. EF 40-45%. 2008: Stent RCA.    Hypercholesterolemia 6/7/2013    Hyperlipidemia     Hypertension     Hypertension, malignant 6/6/2014    Diagnosed 2005.    LBBB (left bundle  branch block) 6/7/2013    Diagnosed 2005.    Mild obesity 5/5/2017 5/5/2017: Weight 97 kg. BMI 32.    Peptic ulcer        Past Surgical History:   Procedure Laterality Date    CORONARY STENT PLACEMENT  2008    [facility]    HAND SURGERY  1957    first digit of third & fourth fingers of right hand removed    TONSILLECTOMY, ADENOIDECTOMY      VASECTOMY         History reviewed. No pertinent family history.    Social History     Tobacco Use    Smoking status: Never    Smokeless tobacco: Never   Substance Use Topics    Alcohol use: No    Drug use: No       Current Outpatient Medications on File Prior to Visit   Medication Sig Dispense Refill    acetaminophen (TYLENOL) 500 MG tablet Take 500 mg by mouth every 6 (six) hours as needed for Pain.      amLODIPine (NORVASC) 10 MG tablet Take 1 tablet (10 mg total) by mouth once daily. 90 tablet 3    chlorthalidone (HYGROTEN) 25 MG Tab Take 1 tablet (25 mg total) by mouth once daily. 90 tablet 3    cyanocobalamin (VITAMIN B-12) 100 MCG tablet Take 100 mcg by mouth once daily.      empagliflozin (JARDIANCE) 10 mg tablet Take 1 tablet (10 mg total) by mouth once daily. 90 tablet 3    EScitalopram oxalate (LEXAPRO) 10 MG tablet TAKE 1 TABLET BY MOUTH DAILY 90 tablet 4    ezetimibe (ZETIA) 10 mg tablet Take 1 tablet (10 mg total) by mouth once daily. 90 tablet 3    ferrous sulfate (FEOSOL) 325 mg (65 mg iron) Tab tablet Take 1 tablet (325 mg total) by mouth daily with breakfast. 90 tablet 3    HYDROcodone-acetaminophen (NORCO)  mg per tablet Take 1 tablet by mouth every 4 to 6 hours as needed for Pain. 12 tablet 0    HYDROcodone-acetaminophen (NORCO) 7.5-325 mg per tablet Take 1 tablet by mouth every 4 to 6 hours as needed for Pain.      MULTIVITAMIN W-MINERALS/LUTEIN (CENTRUM SILVER ORAL) Take 1 tablet by mouth once daily.      omeprazole (PRILOSEC) 20 MG capsule Take 20 mg by mouth once daily.   11    ramipriL (ALTACE) 10 MG capsule Take 1 capsule (10 mg total) by mouth once  "daily. 90 capsule 3    rosuvastatin (CRESTOR) 20 MG tablet Take 1 tablet (20 mg total) by mouth once daily. (Patient taking differently: Take 20 mg by mouth every evening.) 90 tablet 3    vitamin D (VITAMIN D3) 1000 units Tab Take 1,000 Units by mouth once daily.      warfarin (JANTOVEN) 3 MG tablet DOSE TO BE ADJUSTED ACCORDING TO INTERNATIONAL NORMALIZED RATIO. (Patient taking differently: Take 3 mg by mouth As instructed. Take 1 and 1/2 tablet by mouth on Sun, Tues., Thurs., and Sun. evenings; and 1 tablet on Mon, Wed, and Fridays evenings) 140 tablet 3     No current facility-administered medications on file prior to visit.       Review of patient's allergies indicates:   Allergen Reactions    Beta-blockers (beta-adrenergic blocking agts) Other (See Comments)      3/1/2019: Metoprolol was discontinued due to HR 46 bpm.        Review of Systems:  A review of 10+ systems was conducted with pertinent positive and negative findings documented in HPI with all other systems reviewed and negative.    OBJECTIVE:     Estimated body mass index is 27.84 kg/m² as calculated from the following:    Height as of this encounter: 5' 9" (1.753 m).    Weight as of this encounter: 85.5 kg (188 lb 7.9 oz).    Vital Signs (Most Recent)  Vitals:    02/15/23 1302   BP: 122/74   Pulse: 71       Physical Exam:  GENERAL: patient sitting comfortably  HEENT: normocephalic  NECK: supple, no JVD  PULM: normal chest rise, no increased WOB  HEART: non-diaphoretic  ABDO: soft, nondistended, nontender  BACK: no CVA tenderness bilaterally  SKIN: warm, dry, well perfused  EXT: no bruising or edema  NEURO: grossly normal with no focal deficits  PSYCH: appropriate mood and affect    Genitourinary Exam:  NOA: appropriate sphincter tone, smooth, non-rubbery, rubbery prostate w/o nodules    Lab Results   Component Value Date    BUN 18 12/01/2022    CREATININE 0.9 12/01/2022    WBC 12.56 12/26/2022    HGB 12.0 (L) 12/26/2022    HCT 36.9 (L) 12/26/2022    "  12/26/2022    AST 27 12/01/2022    ALT 32 12/01/2022    ALKPHOS 49 (L) 12/01/2022    ALBUMIN 4.5 12/01/2022    HGBA1C 5.9 (H) 12/01/2022        PSA:  Lab Results   Component Value Date    PSA 5.8 (H) 12/06/2022       Testosterone:  No results found for: TOTALTESTOST, TOTALTESTOST, TESTOSTERONE     Imaging:  I have personally reviewed all relevant imaging.    No results found for this or any previous visit (from the past 2160 hour(s)).  No results found for this or any previous visit (from the past 2160 hour(s)).  Echo Bubble Contrast? No  · The left ventricle is mildly enlarged with mild eccentric hypertrophy   and low normal systolic function.  · The estimated ejection fraction is 50%.  · There are segmental left ventricular wall motion abnormalities. The mid   anteroseptal wall and apical septum are moderately hypokinetic. The basal   inferolateral wall is severely hypokinetic. The remainder of the left   ventricular walls contract well.  · There is abnormal septal wall motion consistent with left bundle branch   block.  · Grade I left ventricular diastolic dysfunction.  · Mild left atrial enlargement.  · Normal right ventricular size with normal right ventricular systolic   function.  · There is a bileaflet tilting disc mechanical aortic valve present. There   is trivial central aortic insufficiency present. Prosthetic aortic valve   is normal.  · The aortic valve mean gradient is 16 mmHg with a dimensionless index of   0.53.  · The estimated PA systolic pressure is 40 mmHg.  · Normal central venous pressure (3 mmHg).         ASSESSMENT     1. Lower urinary tract symptoms due to benign prostatic hyperplasia    2. Elevated PSA, less than 10 ng/ml        PLAN:     Elevated PSA    - PSA values were discussed. PSA is a protein made by the prostate in both benign and malignant conditions. I discussed with the finding of an abnormal PSA test or rising PSA level. We discussed about the benefits and limitations  of PSA testing/screening.    - We discussed the most common differential diagnosis of an elevated PSA including BPH, prostatitis (chronic and acute), and prostate cancer. Other common benign reasons for elevated PSA include infection, recent instrumentation, ejaculation, and trauma.     -  We also discussed next steps, including repeating the PSA, proceeding to prostate needle biopsy, as well as the utility of a multi-parametric prostate MRI.    - After our discussion, we decided to proceed with repeat PSA in 3 months.    2. BPH with LUTS    - We discussed the association of lower urinary tract symptoms (LUTS) and prostate enlargement (BPH). The management of BPH varies widely depending on the degree of bother, adverse events related to BPH, and the overall size of the prostate.     - We discussed behavioral modifications and medical management with alpha-blockers or 5 alpha-reductase inhibitors. We reviewed the surgical options available including Rezum, Urolift, TURP, HoLEP, and simple prostatectomy with both robotic and open approaches. We reviewed the indications for each as well as the risks, benefits, and re-treatment rates associated with each procedure.     - After a long discussion, we will initiate tamsulosin. Follow up in 3 months.       Kem Donnelly MD  Urology  [facility]    Disclaimer: This note has been generated using voice-recognition software. There may be typographical errors that have been missed during proof-reading.      0 = independent

## 2023-02-20 ENCOUNTER — LAB VISIT (OUTPATIENT)
Dept: LAB | Facility: HOSPITAL | Age: 73
End: 2023-02-20
Attending: INTERNAL MEDICINE
Payer: MEDICARE

## 2023-02-20 DIAGNOSIS — Z79.01 CHRONIC ANTICOAGULATION: ICD-10-CM

## 2023-02-20 DIAGNOSIS — Z95.2 HISTORY OF HEART VALVE REPLACEMENT: ICD-10-CM

## 2023-02-20 DIAGNOSIS — I35.9 AORTIC VALVE DISEASE: ICD-10-CM

## 2023-02-20 PROCEDURE — 36415 COLL VENOUS BLD VENIPUNCTURE: CPT | Mod: HCNC,PO | Performed by: INTERNAL MEDICINE

## 2023-02-20 PROCEDURE — 85610 PROTHROMBIN TIME: CPT | Mod: HCNC | Performed by: INTERNAL MEDICINE

## 2023-02-22 ENCOUNTER — TELEPHONE (OUTPATIENT)
Dept: CARDIOLOGY | Facility: CLINIC | Age: 73
End: 2023-02-22
Payer: MEDICARE

## 2023-02-22 NOTE — TELEPHONE ENCOUNTER
Present warfarin dose:  4.5 mg 2/7 & 3 mg 5/7.    Patient notified and verbalized understanding.        ----- Message from Sharad Delgadillo MD sent at 2/20/2023  6:27 PM CST -----  Continue the current dosing of warfarin.    Recheck INR in 4 weeks.    Sharad Delgadillo M.D.

## 2023-03-13 ENCOUNTER — OFFICE VISIT (OUTPATIENT)
Dept: CARDIOLOGY | Facility: CLINIC | Age: 73
End: 2023-03-13
Attending: INTERNAL MEDICINE
Payer: MEDICARE

## 2023-03-13 VITALS
HEART RATE: 68 BPM | OXYGEN SATURATION: 98 % | BODY MASS INDEX: 29.03 KG/M2 | DIASTOLIC BLOOD PRESSURE: 82 MMHG | HEIGHT: 69 IN | SYSTOLIC BLOOD PRESSURE: 138 MMHG | WEIGHT: 196 LBS

## 2023-03-13 DIAGNOSIS — Z95.2 HISTORY OF HEART VALVE REPLACEMENT: ICD-10-CM

## 2023-03-13 DIAGNOSIS — E11.59 TYPE 2 DIABETES MELLITUS WITH OTHER CIRCULATORY COMPLICATION, WITHOUT LONG-TERM CURRENT USE OF INSULIN: ICD-10-CM

## 2023-03-13 DIAGNOSIS — I10 PRIMARY HYPERTENSION: ICD-10-CM

## 2023-03-13 DIAGNOSIS — Z95.5 HISTORY OF CORONARY ARTERY STENT PLACEMENT: ICD-10-CM

## 2023-03-13 DIAGNOSIS — I44.7 LEFT BUNDLE BRANCH BLOCK: ICD-10-CM

## 2023-03-13 DIAGNOSIS — I35.9 AORTIC VALVE DISEASE: ICD-10-CM

## 2023-03-13 DIAGNOSIS — E66.3 OVERWEIGHT: ICD-10-CM

## 2023-03-13 DIAGNOSIS — Z79.01 CHRONIC ANTICOAGULATION: ICD-10-CM

## 2023-03-13 DIAGNOSIS — I25.10 CORONARY ARTERY DISEASE INVOLVING NATIVE CORONARY ARTERY OF NATIVE HEART WITHOUT ANGINA PECTORIS: ICD-10-CM

## 2023-03-13 DIAGNOSIS — E78.00 HYPERCHOLESTEROLEMIA: ICD-10-CM

## 2023-03-13 DIAGNOSIS — I50.22 HEART FAILURE, SYSTOLIC, CHRONIC: ICD-10-CM

## 2023-03-13 DIAGNOSIS — K21.9 GASTROESOPHAGEAL REFLUX DISEASE WITHOUT ESOPHAGITIS: ICD-10-CM

## 2023-03-13 PROBLEM — E11.65 TYPE 2 DIABETES MELLITUS WITH HYPERGLYCEMIA, WITHOUT LONG-TERM CURRENT USE OF INSULIN: Status: RESOLVED | Noted: 2021-06-01 | Resolved: 2023-03-13

## 2023-03-13 PROBLEM — Z01.810 PRE-OPERATIVE CARDIOVASCULAR EXAMINATION: Status: RESOLVED | Noted: 2022-12-12 | Resolved: 2023-03-13

## 2023-03-13 PROBLEM — E78.5 DYSLIPIDEMIA: Status: RESOLVED | Noted: 2021-06-01 | Resolved: 2023-03-13

## 2023-03-13 PROCEDURE — 1157F ADVNC CARE PLAN IN RCRD: CPT | Mod: HCNC,CPTII,S$GLB, | Performed by: INTERNAL MEDICINE

## 2023-03-13 PROCEDURE — 4010F PR ACE/ARB THEARPY RXD/TAKEN: ICD-10-PCS | Mod: HCNC,CPTII,S$GLB, | Performed by: INTERNAL MEDICINE

## 2023-03-13 PROCEDURE — 1126F AMNT PAIN NOTED NONE PRSNT: CPT | Mod: HCNC,CPTII,S$GLB, | Performed by: INTERNAL MEDICINE

## 2023-03-13 PROCEDURE — 1160F PR REVIEW ALL MEDS BY PRESCRIBER/CLIN PHARMACIST DOCUMENTED: ICD-10-PCS | Mod: HCNC,CPTII,S$GLB, | Performed by: INTERNAL MEDICINE

## 2023-03-13 PROCEDURE — 99999 PR PBB SHADOW E&M-EST. PATIENT-LVL III: CPT | Mod: PBBFAC,HCNC,, | Performed by: INTERNAL MEDICINE

## 2023-03-13 PROCEDURE — 1126F PR PAIN SEVERITY QUANTIFIED, NO PAIN PRESENT: ICD-10-PCS | Mod: HCNC,CPTII,S$GLB, | Performed by: INTERNAL MEDICINE

## 2023-03-13 PROCEDURE — 3075F PR MOST RECENT SYSTOLIC BLOOD PRESS GE 130-139MM HG: ICD-10-PCS | Mod: HCNC,CPTII,S$GLB, | Performed by: INTERNAL MEDICINE

## 2023-03-13 PROCEDURE — 3008F PR BODY MASS INDEX (BMI) DOCUMENTED: ICD-10-PCS | Mod: HCNC,CPTII,S$GLB, | Performed by: INTERNAL MEDICINE

## 2023-03-13 PROCEDURE — 4010F ACE/ARB THERAPY RXD/TAKEN: CPT | Mod: HCNC,CPTII,S$GLB, | Performed by: INTERNAL MEDICINE

## 2023-03-13 PROCEDURE — 3075F SYST BP GE 130 - 139MM HG: CPT | Mod: HCNC,CPTII,S$GLB, | Performed by: INTERNAL MEDICINE

## 2023-03-13 PROCEDURE — 3008F BODY MASS INDEX DOCD: CPT | Mod: HCNC,CPTII,S$GLB, | Performed by: INTERNAL MEDICINE

## 2023-03-13 PROCEDURE — 1159F PR MEDICATION LIST DOCUMENTED IN MEDICAL RECORD: ICD-10-PCS | Mod: HCNC,CPTII,S$GLB, | Performed by: INTERNAL MEDICINE

## 2023-03-13 PROCEDURE — 1159F MED LIST DOCD IN RCRD: CPT | Mod: HCNC,CPTII,S$GLB, | Performed by: INTERNAL MEDICINE

## 2023-03-13 PROCEDURE — 99999 PR PBB SHADOW E&M-EST. PATIENT-LVL III: ICD-10-PCS | Mod: PBBFAC,HCNC,, | Performed by: INTERNAL MEDICINE

## 2023-03-13 PROCEDURE — 1160F RVW MEDS BY RX/DR IN RCRD: CPT | Mod: HCNC,CPTII,S$GLB, | Performed by: INTERNAL MEDICINE

## 2023-03-13 PROCEDURE — 3079F DIAST BP 80-89 MM HG: CPT | Mod: HCNC,CPTII,S$GLB, | Performed by: INTERNAL MEDICINE

## 2023-03-13 PROCEDURE — 1157F PR ADVANCE CARE PLAN OR EQUIV PRESENT IN MEDICAL RECORD: ICD-10-PCS | Mod: HCNC,CPTII,S$GLB, | Performed by: INTERNAL MEDICINE

## 2023-03-13 PROCEDURE — 99214 PR OFFICE/OUTPT VISIT, EST, LEVL IV, 30-39 MIN: ICD-10-PCS | Mod: HCNC,S$GLB,, | Performed by: INTERNAL MEDICINE

## 2023-03-13 PROCEDURE — 3079F PR MOST RECENT DIASTOLIC BLOOD PRESSURE 80-89 MM HG: ICD-10-PCS | Mod: HCNC,CPTII,S$GLB, | Performed by: INTERNAL MEDICINE

## 2023-03-13 PROCEDURE — 99214 OFFICE O/P EST MOD 30 MIN: CPT | Mod: HCNC,S$GLB,, | Performed by: INTERNAL MEDICINE

## 2023-03-13 RX ORDER — CHLORTHALIDONE 25 MG/1
25 TABLET ORAL DAILY
Qty: 90 TABLET | Refills: 3 | Status: SHIPPED | OUTPATIENT
Start: 2023-03-13 | End: 2023-12-15 | Stop reason: SDUPTHER

## 2023-03-13 RX ORDER — RAMIPRIL 10 MG/1
10 CAPSULE ORAL DAILY
Qty: 90 CAPSULE | Refills: 3 | Status: SHIPPED | OUTPATIENT
Start: 2023-03-13 | End: 2023-12-15 | Stop reason: SDUPTHER

## 2023-03-13 RX ORDER — AMLODIPINE BESYLATE 10 MG/1
10 TABLET ORAL DAILY
Qty: 90 TABLET | Refills: 3 | Status: SHIPPED | OUTPATIENT
Start: 2023-03-13 | End: 2023-12-15 | Stop reason: SDUPTHER

## 2023-03-13 RX ORDER — ALLOPURINOL 300 MG/1
300 TABLET ORAL
COMMUNITY
Start: 2023-02-03

## 2023-03-13 RX ORDER — ROSUVASTATIN CALCIUM 20 MG/1
20 TABLET, COATED ORAL DAILY
Qty: 90 TABLET | Refills: 3 | Status: SHIPPED | OUTPATIENT
Start: 2023-03-13 | End: 2023-12-15 | Stop reason: SDUPTHER

## 2023-03-13 RX ORDER — WARFARIN 3 MG/1
TABLET ORAL
Qty: 140 TABLET | Refills: 3 | Status: SHIPPED | OUTPATIENT
Start: 2023-03-13 | End: 2023-12-15 | Stop reason: SDUPTHER

## 2023-03-13 RX ORDER — EZETIMIBE 10 MG/1
10 TABLET ORAL DAILY
Qty: 90 TABLET | Refills: 3 | Status: SHIPPED | OUTPATIENT
Start: 2023-03-13 | End: 2023-12-15 | Stop reason: SDUPTHER

## 2023-03-13 NOTE — PROGRESS NOTES
Subjective:     Kanu Carrero is a 72 y.o.male with hypertension, hypercholesterolemia and diabetes mellitus type 2. He is mildly obese. He has coronary artery disease with mild left main disease and a history of an intervention of the right coronary artery in 2008. He has left bundle branch block. He had moderate to severe aortic stenosis. In the fall of 2013 developed mild exertional chest pressure. He underwent cardiac catheterization on 1/30/2014 which revealed an aortic valve area of 1.1 cm2. No obstructive coronary artery disease was seen with an about 40% left main stenosis. There was mild left ventricular dysfunction. He underwent aortic valve replacement receiving a [employer] mechanical valve on 3/11/2014. He was begun on warfarin with anticoagulation managed by Dr. Delgadillo. He was diagnosed with iron deficiency anemia in 10/2016. He underwent an esophagogastroduodenoscopy and colonoscopy that he said were negative. It was decided to discontine aspirin that he was then taking in addition to being anticoagulated. On 11/4/2022 he began empagliflozin. On 12/19/2022 he underwent arthroscopic surgery of the left knee. No exertional chest discomfort or exertional dyspnea. No palpitations or weak spells. No bleeding. No issues with any of his prescribed medications. Feeling well overall.       Coronary Artery Disease  Presents for follow-up visit. The disease course has been stable. The condition has lasted for  years. Pertinent negatives include no chest pain, chest pressure, chest tightness, dizziness, leg swelling, muscle weakness, palpitations, shortness of breath or weight gain. Risk factors include diabetes, hyperlipidemia, hypertension and obesity. Risk factors do not include decreased physical activity. His past medical history is significant for CHF. There is no history of arrhythmia. The symptoms have been stable.   Congestive Heart Failure  Presents for follow-up visit. The disease course has been  stable. The condition has lasted for  years. Pertinent negatives include no abdominal pain, chest pain, chest pressure, claudication, edema, fatigue, muscle weakness, near-syncope, nocturia, orthopnea, palpitations, paroxysmal nocturnal dyspnea, shortness of breath or unexpected weight change. The symptoms have been stable. His past medical history is significant for CAD, DM and HTN. There is no history of arrhythmia or chronic lung disease.   Hypertension  This is a chronic problem. The current episode started more than 1 year ago. The problem is unchanged. The problem is controlled (usually 115-125/55-60 mmHg at home). Pertinent negatives include no anxiety, blurred vision, chest pain, headaches, malaise/fatigue, neck pain, orthopnea, palpitations, peripheral edema, PND, shortness of breath or sweats. There is no history of chronic renal disease.   Hyperlipidemia  This is a chronic problem. The current episode started more than 1 year ago. The problem is controlled. Recent lipid tests were reviewed and are variable. Exacerbating diseases include diabetes and obesity. He has no history of chronic renal disease, hypothyroidism, liver disease or nephrotic syndrome. Pertinent negatives include no chest pain, focal sensory loss, focal weakness, leg pain, myalgias or shortness of breath.     Review of Systems   Constitutional: Negative for chills, fatigue, fever, malaise/fatigue, unexpected weight change and weight gain.   HENT:  Negative for hoarse voice and nosebleeds.    Eyes:  Negative for blurred vision, pain, vision loss in left eye and vision loss in right eye.   Cardiovascular:  Negative for chest pain, claudication, dyspnea on exertion, irregular heartbeat, leg swelling, near-syncope, orthopnea, palpitations, paroxysmal nocturnal dyspnea and syncope.   Respiratory:  Negative for chest tightness, cough, hemoptysis, shortness of breath and wheezing.    Endocrine: Negative for cold intolerance and heat  intolerance.   Hematologic/Lymphatic: Negative for bleeding problem. Does not bruise/bleed easily.   Skin:  Negative for color change and rash.   Musculoskeletal:  Negative for back pain, falls, gout, muscle weakness, myalgias and neck pain.   Gastrointestinal:  Negative for abdominal pain, heartburn, hematemesis, hematochezia, hemorrhoids, jaundice, melena, nausea and vomiting.   Genitourinary:  Negative for dysuria, hematuria and nocturia.   Neurological:  Negative for dizziness, focal weakness, headaches, light-headedness, loss of balance, numbness, tremors and vertigo.   Psychiatric/Behavioral:  Negative for altered mental status, depression and memory loss. The patient is not nervous/anxious.    Allergic/Immunologic: Negative for hives and persistent infections.       Current Outpatient Medications on File Prior to Visit   Medication Sig Dispense Refill    acetaminophen (TYLENOL) 500 MG tablet Take 500 mg by mouth every 6 (six) hours as needed for Pain.      allopurinoL (ZYLOPRIM) 300 MG tablet Take 300 mg by mouth.      amLODIPine (NORVASC) 10 MG tablet Take 1 tablet (10 mg total) by mouth once daily. 90 tablet 3    chlorthalidone (HYGROTEN) 25 MG Tab Take 1 tablet (25 mg total) by mouth once daily. 90 tablet 3    cyanocobalamin (VITAMIN B-12) 100 MCG tablet Take 100 mcg by mouth once daily.      empagliflozin (JARDIANCE) 10 mg tablet Take 1 tablet (10 mg total) by mouth once daily. 90 tablet 3    EScitalopram oxalate (LEXAPRO) 10 MG tablet TAKE 1 TABLET BY MOUTH DAILY 90 tablet 4    ezetimibe (ZETIA) 10 mg tablet Take 1 tablet (10 mg total) by mouth once daily. 90 tablet 3    ferrous sulfate (FEOSOL) 325 mg (65 mg iron) Tab tablet Take 1 tablet (325 mg total) by mouth daily with breakfast. 90 tablet 3    MULTIVITAMIN W-MINERALS/LUTEIN (CENTRUM SILVER ORAL) Take 1 tablet by mouth once daily.      omeprazole (PRILOSEC) 20 MG capsule Take 20 mg by mouth once daily.   11    ramipriL (ALTACE) 10 MG capsule Take 1  "capsule (10 mg total) by mouth once daily. 90 capsule 3    rosuvastatin (CRESTOR) 20 MG tablet Take 1 tablet (20 mg total) by mouth once daily. (Patient taking differently: Take 20 mg by mouth every evening.) 90 tablet 3    tamsulosin (FLOMAX) 0.4 mg Cap Take 1 capsule (0.4 mg total) by mouth once daily. 90 capsule 3    vitamin D (VITAMIN D3) 1000 units Tab Take 1,000 Units by mouth once daily.      warfarin (JANTOVEN) 3 MG tablet DOSE TO BE ADJUSTED ACCORDING TO INTERNATIONAL NORMALIZED RATIO. (Patient taking differently: Take 3 mg by mouth As instructed. Take 1 and 1/2 tablet by mouth on Sun, Tues., Thurs., and Sun. evenings; and 1 tablet on Mon, Wed, and Fridays evenings) 140 tablet 3    HYDROcodone-acetaminophen (NORCO)  mg per tablet Take 1 tablet by mouth every 4 to 6 hours as needed for Pain. (Patient not taking: Reported on 3/13/2023) 12 tablet 0    HYDROcodone-acetaminophen (NORCO) 7.5-325 mg per tablet Take 1 tablet by mouth every 4 to 6 hours as needed for Pain.       No current facility-administered medications on file prior to visit.       /82 (BP Location: Right arm, Patient Position: Sitting, BP Method: Large (Manual))   Pulse 68   Ht 5' 9" (1.753 m)   Wt 88.9 kg (196 lb)   SpO2 98%   BMI 28.94 kg/m²       Objective:     Physical Exam  Constitutional:       General: He is not in acute distress.     Appearance: Normal appearance. He is well-developed. He is not ill-appearing or diaphoretic.   HENT:      Head: Normocephalic and atraumatic.      Nose: Nose normal.   Eyes:      General:         Right eye: No discharge.         Left eye: No discharge.      Conjunctiva/sclera:      Right eye: Right conjunctiva is not injected.      Left eye: Left conjunctiva is not injected.      Pupils: Pupils are equal.      Right eye: Pupil is round.      Left eye: Pupil is round.   Neck:      Thyroid: No thyroid mass or thyromegaly.      Vascular: No carotid bruit or JVD.   Cardiovascular:      Rate and " Rhythm: Regular rhythm. Bradycardia present. No extrasystoles are present.     Chest Wall: PMI is not displaced.      Pulses:           Radial pulses are 2+ on the right side and 2+ on the left side.        Femoral pulses are 2+ on the right side and 2+ on the left side.       Dorsalis pedis pulses are 2+ on the right side and 2+ on the left side.        Posterior tibial pulses are 2+ on the right side and 2+ on the left side.      Heart sounds: S1 normal. Murmur heard.   Harsh midsystolic murmur is present with a grade of 3/6 at the upper right sternal border. Mechanical S2.     No gallop.      Comments: Mechanical S2.  Pulmonary:      Effort: Pulmonary effort is normal.      Breath sounds: Normal breath sounds.   Abdominal:      Palpations: Abdomen is soft.      Tenderness: There is no abdominal tenderness.   Musculoskeletal:      Cervical back: Neck supple.      Right ankle: Swelling present. No deformity or ecchymosis.      Left ankle: Swelling present. No deformity or ecchymosis.   Lymphadenopathy:      Head:      Right side of head: No submandibular adenopathy.      Left side of head: No submandibular adenopathy.      Cervical: No cervical adenopathy.   Skin:     General: Skin is warm and dry.   Neurological:      General: No focal deficit present.      Mental Status: He is alert and oriented to person, place, and time. He is not disoriented.      Cranial Nerves: No cranial nerve deficit.   Psychiatric:         Attention and Perception: Attention and perception normal.         Mood and Affect: Mood and affect normal.         Speech: Speech normal.         Behavior: Behavior normal.         Thought Content: Thought content normal.         Cognition and Memory: Cognition and memory normal.         Judgment: Judgment normal.        Assessment:     1. Aortic valve disease    2. History of heart valve replacement    3. Chronic anticoagulation    4. Coronary artery disease involving native coronary artery of native  heart without angina pectoris    5. History of coronary artery stent placement    6. Heart failure, systolic, chronic    7. Left bundle branch block    8. Primary hypertension    9. Hypercholesterolemia    10. Type 2 diabetes mellitus with other circulatory complication, without long-term current use of insulin    11. Overweight    12. Gastroesophageal reflux disease without esophagitis        Plan:     1. Aortic Valve Disease              5/22/2013: Echo: Mild AS 3.0 m/s.   Fall 2013: Mild to moderate exertional chest pressure.   1/21/2014: Echo: Normal LV size with low normal LV function. EF 50-55%. LBBB. Moderate AS - 3.2 m/s - 1.2 cm2. Mild to moderate MR.              1/21/2014: Carotid Duplex: Mild plaquing.              1/30/2014: [facility] [place]: Severe aortic stenosis - 1.1 cm2. Mean gradient 47 mm Hg. Mild CAD. Mild LV dysfunction with EF 50%.              3/11/2014: [facility] [place]: AVR: [facility] Mechanical Valve - 23 mm.   5/28/2014: Echo: Mechanical aortic valve - 2.4 m/s - mild AR.   5/25/2021: Echo: Mechanical AVR - fine - MG 16 mmHg - DI 0.53.   On warfarin.   Knows about endocarditis prophylaxis.   Stable.    2. Chronic Anticoagulation   3/2014: Began warfarin for mechanical aortic valve. Goal INR 2-3. Anticoagulation managed by Dr. Delgadillo.    2018: Aspirin 81 mg was discontinued due to iron deficiency anemia.   On warfarin.   INR followed.   No aspirin or NSAIDs.    No obvious bleeding.    3. Coronary Artery Disease   2008: [facility]: Cath: RCA: Stent.   6/8/2010: [facility] [place]: Cath: LM 40%. RCA: Stent patent. EF 40-45%.              No aspirin as he has iron deficiency anemia and is anticoagulated.              Stable.    4. Heart Failure, Systolic and Diastolic, Chronic   2005: Diagnosed. EF 40-45%.   5/22/2013: Echo: EF 50-55%.   5/28/2014: Echo: Normal left ventricular size and function. Mild LVH. Mildly dilated LA. Mechanical aortic valve - 2.4 m/s - mild AR.   9/8/2016: Echo: Normal left ventricular size and systolic  function. Mild LVH. Moderate diastolic dysfunction. LBBB. Moderately dilated LA. Mechanical AVR fine - 2.7 m/s.   5/25/2021: Echo: The left ventricle is mildly dilated with low normal systolic function. EF 50%. The mid anteroseptal wall and apical septum are moderately hypokinetic. The basal inferolateral wall is severely hypokinetic. The remainder contract well. LBBB. Mild diastolic dysfunction. Mildly dilated LA. Mechanical AVR - fine - MG 16 mmHg - DI 0.53.   3/1/2019: Metoprolol was discontinued due to HR of 46 bpm.    11/4/2022: Empagliflozin 10 mg Q24 was begun.              On empagliflozin 10 mg Q24, ramipril 10 mg Q24 and chlorthalidone 25 mg Q24.   Well compensated.   5/2023: Plan next Echo. Then every few years.     5. Left Bundle Branch Block              2005: Diagnosed.   9/8/2017: SB. LBBB.  ms.   10/19/2018: SB 52. LBBB with  msec.   3/1/2019: HR 46 bpm.   7/5/2019: HR 68 bpm.   5/20/2021: HR 67.  ms. LBBB with  ms.   7/1/2022: ECG: LBBB.  ms.              Stable.                6. Hypertension              2005: Diagnosed.   3/1/2019: Metoprolol was discontinued due to HR of 46 bpm.    On amlodipine 10 mg Q24, ramipril 10 mg Q24 and chlorthalidone 25 mg Q24.   Leg edema resolved with diuretic.   Keeping log at home.   Well controlled.     7. Hypercholesterolemia   2007: Began statin.              On atorvastatin 80 mg Q24.   12/16/2016: Chol 134. HDL 35. LDL 74. .   May have had some muscle aches while on atorvastatin.    On rosuvastatin 10 mg Q24.   9/13/2017: Chol 158. HDL 33. LDL 96. .   On rosuvastatin 20 mg Q24.   9/14/2018: Chol 153. HDL 35. LDL 92. .   2/20/2019: Chol 151. HDL 35. LDL 86. .   7/9/2020: Chol 169. HDL 40. LDL 95. .   On rosuvastatin 20 mg Q24.   9/16/2020: Ezetimibe 10 mg Q24 was begun.   On rosuvastatin 20 mg Q24 and ezetimibe 10 mg Q24    11/18/2020: Chol 109. HDL 34. LDL 50. .   6/3/2021: Chol 112. HDL 33.  LDL 49. .   6/3/2022: Chol 113. HDL 41. LDL 48. .   12/1/2022: Chol 129. HDL 38. LDL 65. .   On rosuvastatin 20 mg Q24 and ezetimibe 10 mg Q24    Very favorable lipid panel.     8. Diabetes Mellitus, Type 2   3/2014: Diagnosed. Complications: CAD. Medications: Oral agent.   11/4/2022: Empagliflozin 10 mg Q24 was begun for HF. Metformin 500 mg Q12 was discontinued.    6/3/2022: HgbA1C 5.8%.   12/1/2022: HgbA1C 5.9%.   On empagliflozin 10 mg Q24 .    Well controlled.     9. Overweight   5/5/2017: Weight 97 kg. BMI 32.   9/8/2017: Weight 95 kg. BMI 31.   6/1/2018: Weight 92 kg. BMI 30.   10/19/2018: Weight 89 kg. BMI 29.   3/1/2019: Weight 94 kg. BMI 31.   11/1/2019: Weight 92 kg. BMI 30.   9/16/2020: Weight 96 kg. BMI 31.   3/13/2023: Weight 89 kg. BMI 29.    Encouraged to lose weight.    10. Gastroesophageal Reflux Disease   2008: Diagnosed.   On omeprazole 20 mg Q24.    11. Anemia   10/2016: Diagnosed with iron deficiency anemia.   10/4/2016: EGD & Colonoscopy: Negative.   Receiving iron.    12. History of Knee Surgery   12/19/2022: Arthroscopic surgery of the left knee.    13. Primary Care              Dr. Olvin Hanley.  Couple of the case this did cause for that so still looks good overall he notes very similar to what it was last time so the lead EKG 9-left bundle if he thickened MV I  F/u 4 months.    Sharad Delgadillo M.D.      6/5/2023 5:30 PM, Addendum:    6/5/2023: Echo: Normal left ventricular size with low normal systolic function. LBBB. Apical septum mildly hypokinetic. EF 50%. Mild diastolic dysfunction. [facility] AVR - fine.     I discussed the above test result and the implications of the findings over the phone.    Sharad Delgadillo M.D.

## 2023-03-21 ENCOUNTER — LAB VISIT (OUTPATIENT)
Dept: LAB | Facility: HOSPITAL | Age: 73
End: 2023-03-21
Attending: INTERNAL MEDICINE
Payer: MEDICARE

## 2023-03-21 DIAGNOSIS — Z95.2 HISTORY OF HEART VALVE REPLACEMENT: ICD-10-CM

## 2023-03-21 DIAGNOSIS — Z79.01 CHRONIC ANTICOAGULATION: ICD-10-CM

## 2023-03-21 DIAGNOSIS — I35.9 AORTIC VALVE DISEASE: ICD-10-CM

## 2023-03-21 LAB
INR PPP: 1.1 (ref 0.8–1.2)
INR PPP: 1.1 (ref 0.8–1.2)
PROTHROMBIN TIME: 11.9 SEC (ref 9–12.5)
PROTHROMBIN TIME: 11.9 SEC (ref 9–12.5)

## 2023-03-21 PROCEDURE — 85610 PROTHROMBIN TIME: CPT | Mod: HCNC | Performed by: INTERNAL MEDICINE

## 2023-03-21 PROCEDURE — 36415 COLL VENOUS BLD VENIPUNCTURE: CPT | Mod: HCNC,PO | Performed by: INTERNAL MEDICINE

## 2023-03-22 ENCOUNTER — TELEPHONE (OUTPATIENT)
Dept: CARDIOLOGY | Facility: CLINIC | Age: 73
End: 2023-03-22
Payer: MEDICARE

## 2023-03-22 NOTE — TELEPHONE ENCOUNTER
Patient states he has been taking warfarin-he takes it every day. Patient notified and verbalized understanding.      ----- Message from Sharad Delgadillo MD sent at 3/21/2023  9:17 PM CDT -----  Current dose: Warfarin: 4.5 mg 4/7 & 3 mg 3/7.    3/21/2023: INR 1.1.    Is he taking warfarin as above? If that is the case: Increase: 12 mg today. Then 4.5 mg 7/7     3/24/2023: Next INR.     Sharad Delgadillo M.D.   UNRELIABLE SCHIRMER TEST RESULTS - PT. DECLINES DRY EYE SYMPTOMS AND IS HAVING NO ISSUES WITH CONTACTS. DISC POSSIBLE NEED FOR PUNCTAL PLUGS AFTER PROCEDURE IF SYMPTOMS ARISE.

## 2023-03-24 ENCOUNTER — LAB VISIT (OUTPATIENT)
Dept: LAB | Facility: HOSPITAL | Age: 73
End: 2023-03-24
Attending: INTERNAL MEDICINE
Payer: MEDICARE

## 2023-03-24 DIAGNOSIS — Z79.01 CHRONIC ANTICOAGULATION: ICD-10-CM

## 2023-03-24 DIAGNOSIS — Z95.2 HISTORY OF HEART VALVE REPLACEMENT: ICD-10-CM

## 2023-03-24 LAB
INR PPP: 1.7 (ref 0.8–1.2)
PROTHROMBIN TIME: 16.9 SEC (ref 9–12.5)

## 2023-03-24 PROCEDURE — 36415 COLL VENOUS BLD VENIPUNCTURE: CPT | Mod: HCNC,PO | Performed by: INTERNAL MEDICINE

## 2023-03-24 PROCEDURE — 85610 PROTHROMBIN TIME: CPT | Mod: HCNC | Performed by: INTERNAL MEDICINE

## 2023-03-27 ENCOUNTER — TELEPHONE (OUTPATIENT)
Dept: CARDIOLOGY | Facility: CLINIC | Age: 73
End: 2023-03-27
Payer: MEDICARE

## 2023-03-27 NOTE — TELEPHONE ENCOUNTER
Left a detailed message on voice mail.      ----- Message from Sharad Delgadillo MD sent at 3/25/2023  2:34 PM CDT -----  Current dose: Warfarin: 4.5 mg 4/7 & 3 mg 3/7.     3/21/2023: INR 1.1.     Is he taking warfarin as above? If that is the case: Increase: 12 mg today. Then 4.5 mg 7/7     3/24/2023: 1.7.    Continue same dose: Warfarin 4.5 mg 7/7.    3/28/2023: Next INR.     Sharad Delgadillo M.D.

## 2023-03-29 ENCOUNTER — LAB VISIT (OUTPATIENT)
Dept: LAB | Facility: HOSPITAL | Age: 73
End: 2023-03-29
Attending: INTERNAL MEDICINE
Payer: MEDICARE

## 2023-03-29 DIAGNOSIS — Z95.2 HISTORY OF HEART VALVE REPLACEMENT: ICD-10-CM

## 2023-03-29 DIAGNOSIS — Z79.01 CHRONIC ANTICOAGULATION: ICD-10-CM

## 2023-03-29 LAB
INR PPP: 2.2 (ref 0.8–1.2)
PROTHROMBIN TIME: 22.1 SEC (ref 9–12.5)

## 2023-03-29 PROCEDURE — 85610 PROTHROMBIN TIME: CPT | Mod: HCNC | Performed by: INTERNAL MEDICINE

## 2023-03-29 PROCEDURE — 36415 COLL VENOUS BLD VENIPUNCTURE: CPT | Mod: HCNC,PO | Performed by: INTERNAL MEDICINE

## 2023-03-30 ENCOUNTER — TELEPHONE (OUTPATIENT)
Dept: CARDIOLOGY | Facility: CLINIC | Age: 73
End: 2023-03-30
Payer: MEDICARE

## 2023-03-30 NOTE — TELEPHONE ENCOUNTER
Patient notified and verbalized understanding.     ----- Message from Sharad Delgadillo MD sent at 3/30/2023 10:36 AM CDT -----  Current dose: Warfarin: 4.5 mg 4/7 & 3 mg 3/7.     3/21/2023: INR 1.1.     Is he taking warfarin as above? If that is the case: Increase: 12 mg today. Then 4.5 mg 7/7     3/24/2023: 1.7.     Continue same dose: Warfarin 4.5 mg 7/7.     3/614623: INR 2.2.    Continue same dose: Warfarin 4.5 mg 7/7.    In 2 weeks: Next INR.     Sharad Delgadillo M.D.

## 2023-04-11 ENCOUNTER — PATIENT MESSAGE (OUTPATIENT)
Dept: ADMINISTRATIVE | Facility: HOSPITAL | Age: 73
End: 2023-04-11
Payer: MEDICARE

## 2023-04-13 ENCOUNTER — LAB VISIT (OUTPATIENT)
Dept: LAB | Facility: HOSPITAL | Age: 73
End: 2023-04-13
Attending: INTERNAL MEDICINE
Payer: MEDICARE

## 2023-04-13 DIAGNOSIS — Z95.2 HISTORY OF HEART VALVE REPLACEMENT: ICD-10-CM

## 2023-04-13 DIAGNOSIS — Z79.01 CHRONIC ANTICOAGULATION: ICD-10-CM

## 2023-04-13 LAB
INR PPP: 2.3 (ref 0.8–1.2)
PROTHROMBIN TIME: 23.4 SEC (ref 9–12.5)

## 2023-04-13 PROCEDURE — 36415 COLL VENOUS BLD VENIPUNCTURE: CPT | Mod: HCNC,PO | Performed by: INTERNAL MEDICINE

## 2023-04-13 PROCEDURE — 85610 PROTHROMBIN TIME: CPT | Mod: HCNC | Performed by: INTERNAL MEDICINE

## 2023-05-11 RX ORDER — ESCITALOPRAM OXALATE 10 MG/1
TABLET ORAL
Qty: 90 TABLET | Refills: 4 | Status: SHIPPED | OUTPATIENT
Start: 2023-05-11 | End: 2023-12-07 | Stop reason: SDUPTHER

## 2023-05-15 ENCOUNTER — LAB VISIT (OUTPATIENT)
Dept: LAB | Facility: HOSPITAL | Age: 73
End: 2023-05-15
Attending: UROLOGY
Payer: MEDICARE

## 2023-05-15 DIAGNOSIS — R97.20 ELEVATED PSA, LESS THAN 10 NG/ML: ICD-10-CM

## 2023-05-15 LAB — COMPLEXED PSA SERPL-MCNC: 3.9 NG/ML (ref 0–4)

## 2023-05-15 PROCEDURE — 84153 ASSAY OF PSA TOTAL: CPT | Performed by: UROLOGY

## 2023-05-15 PROCEDURE — 36415 COLL VENOUS BLD VENIPUNCTURE: CPT | Mod: PO | Performed by: UROLOGY

## 2023-05-17 ENCOUNTER — LAB VISIT (OUTPATIENT)
Dept: LAB | Facility: HOSPITAL | Age: 73
End: 2023-05-17
Attending: INTERNAL MEDICINE
Payer: MEDICARE

## 2023-05-17 DIAGNOSIS — Z79.01 CHRONIC ANTICOAGULATION: ICD-10-CM

## 2023-05-17 DIAGNOSIS — Z95.2 HISTORY OF HEART VALVE REPLACEMENT: ICD-10-CM

## 2023-05-17 LAB
INR PPP: 3.1 (ref 0.8–1.2)
PROTHROMBIN TIME: 31.5 SEC (ref 9–12.5)

## 2023-05-17 PROCEDURE — 36415 COLL VENOUS BLD VENIPUNCTURE: CPT | Mod: PO | Performed by: INTERNAL MEDICINE

## 2023-05-17 PROCEDURE — 85610 PROTHROMBIN TIME: CPT | Performed by: INTERNAL MEDICINE

## 2023-05-25 ENCOUNTER — PES CALL (OUTPATIENT)
Dept: ADMINISTRATIVE | Facility: CLINIC | Age: 73
End: 2023-05-25
Payer: MEDICARE

## 2023-05-31 ENCOUNTER — LAB VISIT (OUTPATIENT)
Dept: LAB | Facility: HOSPITAL | Age: 73
End: 2023-05-31
Attending: INTERNAL MEDICINE
Payer: MEDICARE

## 2023-05-31 DIAGNOSIS — Z95.2 HISTORY OF HEART VALVE REPLACEMENT: ICD-10-CM

## 2023-05-31 DIAGNOSIS — Z79.01 CHRONIC ANTICOAGULATION: ICD-10-CM

## 2023-05-31 LAB
INR PPP: 3.3 (ref 0.8–1.2)
PROTHROMBIN TIME: 33.2 SEC (ref 9–12.5)

## 2023-05-31 PROCEDURE — 36415 COLL VENOUS BLD VENIPUNCTURE: CPT | Mod: PO | Performed by: INTERNAL MEDICINE

## 2023-05-31 PROCEDURE — 85610 PROTHROMBIN TIME: CPT | Performed by: INTERNAL MEDICINE

## 2023-06-01 ENCOUNTER — TELEPHONE (OUTPATIENT)
Dept: CARDIOLOGY | Facility: CLINIC | Age: 73
End: 2023-06-01
Payer: MEDICARE

## 2023-06-01 NOTE — TELEPHONE ENCOUNTER
Pt notified and verbalized understanding.          ----- Message from Sharad Delgadillo MD sent at 5/31/2023  5:43 PM CDT -----  Current dose: Warfarin: 4.5 mg 4/7 & 3 mg 3/7.     3/21/2023: INR 1.1.     Is he taking warfarin as above? If that is the case: Increase: 12 mg today. Then 4.5 mg 7/7     3/24/2023: 1.7.     Continue same dose: Warfarin 4.5 mg 7/7.     [id number]: INR 2.2.     Continue same dose: Warfarin 4.5 mg 7/7.     4/13/2023: INR 2.3.     Continue same dose: Warfarin 4.5 mg 7/7.     5/17/2023: INR 3.1.     Continue same dose: Warfarin 4.5 mg 7/7.    5/31/2023: INR 3.3.     In 2 weeks: Next INR. [As climbing].     Sharad Delgadillo M.D.

## 2023-06-02 ENCOUNTER — LAB VISIT (OUTPATIENT)
Dept: LAB | Facility: HOSPITAL | Age: 73
End: 2023-06-02
Attending: FAMILY MEDICINE
Payer: MEDICARE

## 2023-06-02 ENCOUNTER — HOSPITAL ENCOUNTER (OUTPATIENT)
Dept: CARDIOLOGY | Facility: OTHER | Age: 73
Discharge: HOME | End: 2023-06-02
Attending: INTERNAL MEDICINE
Payer: COMMERCIAL

## 2023-06-02 VITALS
BODY MASS INDEX: 29.03 KG/M2 | WEIGHT: 196 LBS | HEIGHT: 69 IN | SYSTOLIC BLOOD PRESSURE: 138 MMHG | DIASTOLIC BLOOD PRESSURE: 82 MMHG

## 2023-06-02 DIAGNOSIS — E11.65 TYPE 2 DIABETES MELLITUS WITH HYPERGLYCEMIA, WITHOUT LONG-TERM CURRENT USE OF INSULIN: ICD-10-CM

## 2023-06-02 DIAGNOSIS — I44.7 LEFT BUNDLE BRANCH BLOCK: ICD-10-CM

## 2023-06-02 DIAGNOSIS — I50.22 HEART FAILURE, SYSTOLIC, CHRONIC: ICD-10-CM

## 2023-06-02 LAB
ALBUMIN/CREAT UR: 23.6 UG/MG (ref 0–30)
CREAT UR-MCNC: 89 MG/DL (ref 23–375)
MICROALBUMIN UR DL<=1MG/L-MCNC: 21 UG/ML

## 2023-06-02 PROCEDURE — 93306 ECHO (CUPID ONLY): ICD-10-PCS | Mod: 26,,, | Performed by: INTERNAL MEDICINE

## 2023-06-02 PROCEDURE — 93306 TTE W/DOPPLER COMPLETE: CPT | Mod: 26,,, | Performed by: INTERNAL MEDICINE

## 2023-06-02 PROCEDURE — 82570 ASSAY OF URINE CREATININE: CPT | Performed by: FAMILY MEDICINE

## 2023-06-02 PROCEDURE — 93306 TTE W/DOPPLER COMPLETE: CPT

## 2023-06-05 LAB
AV INDEX (PROSTH): 0.95
AV MEAN GRADIENT: 7 MMHG
AV PEAK GRADIENT: 15 MMHG
AV VALVE AREA: 2.49 CM2
AV VELOCITY RATIO: 0.71
BSA FOR ECHO PROCEDURE: 2.08 M2
CV ECHO LV RWT: 0.5 CM
DOP CALC AO PEAK VEL: 1.92 M/S
DOP CALC AO VTI: 28.9 CM
DOP CALC LVOT AREA: 2.6 CM2
DOP CALC LVOT DIAMETER: 1.83 CM
DOP CALC LVOT PEAK VEL: 1.36 M/S
DOP CALC LVOT STROKE VOLUME: 72.03 CM3
DOP CALCLVOT PEAK VEL VTI: 27.4 CM
E WAVE DECELERATION TIME: 229.23 MSEC
E/A RATIO: 0.8
E/E' RATIO: 12 M/S
ECHO LV POSTERIOR WALL: 0.96 CM (ref 0.6–1.1)
EJECTION FRACTION: 50 %
FRACTIONAL SHORTENING: 34 % (ref 28–44)
INTERVENTRICULAR SEPTUM: 1.03 CM (ref 0.6–1.1)
IVRT: 76.12 MSEC
LA MAJOR: 6.15 CM
LA MINOR: 5.39 CM
LA WIDTH: 3.8 CM
LEFT ATRIUM SIZE: 4.11 CM
LEFT ATRIUM VOLUME INDEX MOD: 34.1 ML/M2
LEFT ATRIUM VOLUME INDEX: 37.2 ML/M2
LEFT ATRIUM VOLUME MOD: 70 CM3
LEFT ATRIUM VOLUME: 76.27 CM3
LEFT INTERNAL DIMENSION IN SYSTOLE: 2.57 CM (ref 2.1–4)
LEFT VENTRICLE DIASTOLIC VOLUME INDEX: 31.54 ML/M2
LEFT VENTRICLE DIASTOLIC VOLUME: 64.66 ML
LEFT VENTRICLE MASS INDEX: 58 G/M2
LEFT VENTRICLE SYSTOLIC VOLUME INDEX: 11.7 ML/M2
LEFT VENTRICLE SYSTOLIC VOLUME: 23.96 ML
LEFT VENTRICULAR INTERNAL DIMENSION IN DIASTOLE: 3.87 CM (ref 3.5–6)
LEFT VENTRICULAR MASS: 119.8 G
LV LATERAL E/E' RATIO: 10.5 M/S
LV SEPTAL E/E' RATIO: 14 M/S
LVOT MG: 4.53 MMHG
LVOT MV: 1 CM/S
MV PEAK A VEL: 1.05 M/S
MV PEAK E VEL: 0.84 M/S
MV STENOSIS PRESSURE HALF TIME: 73.19 MS
MV VALVE AREA P 1/2 METHOD: 3.01 CM2
PISA TR MAX VEL: 2.55 M/S
PV MV: 0.85 M/S
PV PEAK S VEL: 0.38 M/S
PV PEAK VELOCITY: 1.21 CM/S
RA MAJOR: 4.78 CM
RA PRESSURE: 3 MMHG
RA WIDTH: 4.2 CM
TDI LATERAL: 0.08 M/S
TDI SEPTAL: 0.06 M/S
TDI: 0.07 M/S
TR MAX PG: 26 MMHG
TV REST PULMONARY ARTERY PRESSURE: 29 MMHG

## 2023-06-06 ENCOUNTER — OFFICE VISIT (OUTPATIENT)
Dept: FAMILY MEDICINE | Facility: CLINIC | Age: 73
End: 2023-06-06
Payer: MEDICARE

## 2023-06-06 VITALS
DIASTOLIC BLOOD PRESSURE: 60 MMHG | WEIGHT: 205.5 LBS | OXYGEN SATURATION: 97 % | HEART RATE: 67 BPM | SYSTOLIC BLOOD PRESSURE: 112 MMHG | BODY MASS INDEX: 30.44 KG/M2 | HEIGHT: 69 IN

## 2023-06-06 DIAGNOSIS — R73.03 PREDIABETES: ICD-10-CM

## 2023-06-06 DIAGNOSIS — D50.8 OTHER IRON DEFICIENCY ANEMIA: ICD-10-CM

## 2023-06-06 DIAGNOSIS — E78.00 HYPERCHOLESTEROLEMIA: ICD-10-CM

## 2023-06-06 DIAGNOSIS — F33.0 MILD EPISODE OF RECURRENT MAJOR DEPRESSIVE DISORDER: ICD-10-CM

## 2023-06-06 DIAGNOSIS — I25.10 CORONARY ARTERY DISEASE INVOLVING NATIVE CORONARY ARTERY OF NATIVE HEART WITHOUT ANGINA PECTORIS: Primary | ICD-10-CM

## 2023-06-06 PROCEDURE — 1101F PT FALLS ASSESS-DOCD LE1/YR: CPT | Mod: CPTII,S$GLB,, | Performed by: FAMILY MEDICINE

## 2023-06-06 PROCEDURE — 3066F PR DOCUMENTATION OF TREATMENT FOR NEPHROPATHY: ICD-10-PCS | Mod: CPTII,S$GLB,, | Performed by: FAMILY MEDICINE

## 2023-06-06 PROCEDURE — 3008F PR BODY MASS INDEX (BMI) DOCUMENTED: ICD-10-PCS | Mod: CPTII,S$GLB,, | Performed by: FAMILY MEDICINE

## 2023-06-06 PROCEDURE — 3288F PR FALLS RISK ASSESSMENT DOCUMENTED: ICD-10-PCS | Mod: CPTII,S$GLB,, | Performed by: FAMILY MEDICINE

## 2023-06-06 PROCEDURE — 1159F PR MEDICATION LIST DOCUMENTED IN MEDICAL RECORD: ICD-10-PCS | Mod: CPTII,S$GLB,, | Performed by: FAMILY MEDICINE

## 2023-06-06 PROCEDURE — 99999 PR PBB SHADOW E&M-EST. PATIENT-LVL IV: CPT | Mod: PBBFAC,,, | Performed by: FAMILY MEDICINE

## 2023-06-06 PROCEDURE — 3061F NEG MICROALBUMINURIA REV: CPT | Mod: CPTII,S$GLB,, | Performed by: FAMILY MEDICINE

## 2023-06-06 PROCEDURE — 1157F PR ADVANCE CARE PLAN OR EQUIV PRESENT IN MEDICAL RECORD: ICD-10-PCS | Mod: CPTII,S$GLB,, | Performed by: FAMILY MEDICINE

## 2023-06-06 PROCEDURE — 99215 OFFICE O/P EST HI 40 MIN: CPT | Mod: S$GLB,,, | Performed by: FAMILY MEDICINE

## 2023-06-06 PROCEDURE — 3066F NEPHROPATHY DOC TX: CPT | Mod: CPTII,S$GLB,, | Performed by: FAMILY MEDICINE

## 2023-06-06 PROCEDURE — 1125F PR PAIN SEVERITY QUANTIFIED, PAIN PRESENT: ICD-10-PCS | Mod: CPTII,S$GLB,, | Performed by: FAMILY MEDICINE

## 2023-06-06 PROCEDURE — 3044F HG A1C LEVEL LT 7.0%: CPT | Mod: CPTII,S$GLB,, | Performed by: FAMILY MEDICINE

## 2023-06-06 PROCEDURE — 99999 PR PBB SHADOW E&M-EST. PATIENT-LVL IV: ICD-10-PCS | Mod: PBBFAC,,, | Performed by: FAMILY MEDICINE

## 2023-06-06 PROCEDURE — 1159F MED LIST DOCD IN RCRD: CPT | Mod: CPTII,S$GLB,, | Performed by: FAMILY MEDICINE

## 2023-06-06 PROCEDURE — 4010F ACE/ARB THERAPY RXD/TAKEN: CPT | Mod: CPTII,S$GLB,, | Performed by: FAMILY MEDICINE

## 2023-06-06 PROCEDURE — 1160F RVW MEDS BY RX/DR IN RCRD: CPT | Mod: CPTII,S$GLB,, | Performed by: FAMILY MEDICINE

## 2023-06-06 PROCEDURE — 3078F DIAST BP <80 MM HG: CPT | Mod: CPTII,S$GLB,, | Performed by: FAMILY MEDICINE

## 2023-06-06 PROCEDURE — 1160F PR REVIEW ALL MEDS BY PRESCRIBER/CLIN PHARMACIST DOCUMENTED: ICD-10-PCS | Mod: CPTII,S$GLB,, | Performed by: FAMILY MEDICINE

## 2023-06-06 PROCEDURE — 4010F PR ACE/ARB THEARPY RXD/TAKEN: ICD-10-PCS | Mod: CPTII,S$GLB,, | Performed by: FAMILY MEDICINE

## 2023-06-06 PROCEDURE — 3288F FALL RISK ASSESSMENT DOCD: CPT | Mod: CPTII,S$GLB,, | Performed by: FAMILY MEDICINE

## 2023-06-06 PROCEDURE — 3074F PR MOST RECENT SYSTOLIC BLOOD PRESSURE < 130 MM HG: ICD-10-PCS | Mod: CPTII,S$GLB,, | Performed by: FAMILY MEDICINE

## 2023-06-06 PROCEDURE — 1101F PR PT FALLS ASSESS DOC 0-1 FALLS W/OUT INJ PAST YR: ICD-10-PCS | Mod: CPTII,S$GLB,, | Performed by: FAMILY MEDICINE

## 2023-06-06 PROCEDURE — 3074F SYST BP LT 130 MM HG: CPT | Mod: CPTII,S$GLB,, | Performed by: FAMILY MEDICINE

## 2023-06-06 PROCEDURE — 3008F BODY MASS INDEX DOCD: CPT | Mod: CPTII,S$GLB,, | Performed by: FAMILY MEDICINE

## 2023-06-06 PROCEDURE — 3044F PR MOST RECENT HEMOGLOBIN A1C LEVEL <7.0%: ICD-10-PCS | Mod: CPTII,S$GLB,, | Performed by: FAMILY MEDICINE

## 2023-06-06 PROCEDURE — 3061F PR NEG MICROALBUMINURIA RESULT DOCUMENTED/REVIEW: ICD-10-PCS | Mod: CPTII,S$GLB,, | Performed by: FAMILY MEDICINE

## 2023-06-06 PROCEDURE — 99215 PR OFFICE/OUTPT VISIT, EST, LEVL V, 40-54 MIN: ICD-10-PCS | Mod: S$GLB,,, | Performed by: FAMILY MEDICINE

## 2023-06-06 PROCEDURE — 1125F AMNT PAIN NOTED PAIN PRSNT: CPT | Mod: CPTII,S$GLB,, | Performed by: FAMILY MEDICINE

## 2023-06-06 PROCEDURE — 3078F PR MOST RECENT DIASTOLIC BLOOD PRESSURE < 80 MM HG: ICD-10-PCS | Mod: CPTII,S$GLB,, | Performed by: FAMILY MEDICINE

## 2023-06-06 PROCEDURE — 1157F ADVNC CARE PLAN IN RCRD: CPT | Mod: CPTII,S$GLB,, | Performed by: FAMILY MEDICINE

## 2023-06-06 RX ORDER — FERROUS SULFATE 325(65) MG
325 TABLET ORAL
Qty: 90 TABLET | Refills: 3 | Status: SHIPPED | OUTPATIENT
Start: 2023-06-06 | End: 2024-06-05

## 2023-06-06 NOTE — PROGRESS NOTES
Subjective     Patient ID: Kanu Carrero is a 73 y.o. male.    Chief Complaint: Follow-up    73 years old male came to the clinic for blood pressure check.  Blood pressure today was stable.  Anemia almost corrected.  Patient currently on iron supplementation.  Depression currently controlled on Lexapro.  Last A1c at the level of prediabetes.  No Polyuria, polydipsia or polyphagia.  Patient with good compliance with medical regimen.    Follow-up  Pertinent negatives include no chest pain.   Review of Systems   Constitutional: Negative.    HENT: Negative.     Eyes: Negative.    Respiratory: Negative.     Cardiovascular: Negative.  Negative for chest pain, palpitations, leg swelling and claudication.   Gastrointestinal: Negative.    Endocrine: Negative for polydipsia, polyphagia and polyuria.   Genitourinary: Negative.    Musculoskeletal: Negative.    Integumentary:  Negative.   Neurological: Negative.    Psychiatric/Behavioral: Negative.          Objective     Physical Exam  Vitals and nursing note reviewed.   Constitutional:       General: He is not in acute distress.     Appearance: He is well-developed. He is not diaphoretic.   HENT:      Head: Normocephalic and atraumatic.      Right Ear: External ear normal.      Left Ear: External ear normal.      Nose: Nose normal.      Mouth/Throat:      Pharynx: No oropharyngeal exudate.   Eyes:      General: No scleral icterus.        Right eye: No discharge.         Left eye: No discharge.      Conjunctiva/sclera: Conjunctivae normal.      Pupils: Pupils are equal, round, and reactive to light.   Neck:      Thyroid: No thyromegaly.      Vascular: No JVD.      Trachea: No tracheal deviation.   Cardiovascular:      Rate and Rhythm: Normal rate and regular rhythm.      Heart sounds: Normal heart sounds. No murmur heard.    No friction rub. No gallop.   Pulmonary:      Effort: Pulmonary effort is normal. No respiratory distress.      Breath sounds: Normal breath sounds. No  stridor. No wheezing or rales.   Chest:      Chest wall: No tenderness.   Abdominal:      General: Bowel sounds are normal. There is no distension.      Palpations: Abdomen is soft. There is no mass.      Tenderness: There is no abdominal tenderness. There is no guarding or rebound.   Musculoskeletal:         General: No tenderness. Normal range of motion.      Cervical back: Normal range of motion and neck supple.   Lymphadenopathy:      Cervical: No cervical adenopathy.   Skin:     General: Skin is warm and dry.      Coloration: Skin is not pale.      Findings: No erythema or rash.   Neurological:      Mental Status: He is alert and oriented to person, place, and time.      Cranial Nerves: No cranial nerve deficit.      Motor: No abnormal muscle tone.      Coordination: Coordination normal.      Deep Tendon Reflexes: Reflexes are normal and symmetric. Reflexes normal.   Psychiatric:         Behavior: Behavior normal.         Thought Content: Thought content normal.         Judgment: Judgment normal.          Assessment and Plan     1. Coronary artery disease involving native coronary artery of native heart without angina pectoris  Overview:  2008: [facility]: Cath: RCA: Stent.  6/8/2010: [facility] [place]: Cath: LM: 40%. RCA: Stent patent. EF 40-45%.    Orders:  -     Lipid Panel; Future; Expected date: 06/06/2023    2. Mild episode of recurrent major depressive disorder  -     Urinalysis; Future  -     Comprehensive Metabolic Panel; Future; Expected date: 06/06/2023  -     TSH; Future; Expected date: 06/06/2023  -     CBC Auto Differential; Future; Expected date: 06/06/2023    3. Other iron deficiency anemia  -     ferrous sulfate (FEOSOL) 325 mg (65 mg iron) Tab tablet; Take 1 tablet (325 mg total) by mouth daily with breakfast.  Dispense: 90 tablet; Refill: 3  -     CBC Auto Differential; Future; Expected date: 06/06/2023    4. Hypercholesterolemia  Overview:  2007: Began statin.    Orders:  -     Comprehensive Metabolic Panel;  Future; Expected date: 06/06/2023  -     Lipid Panel; Future; Expected date: 06/06/2023  -     TSH; Future; Expected date: 06/06/2023    5. Prediabetes  -     Comprehensive Metabolic Panel; Future; Expected date: 06/06/2023  -     Hemoglobin A1C; Future; Expected date: 06/06/2023        Continue monitoring blood pressure at home, low sodium diet.          Follow up in about 6 months (around 12/6/2023), or if symptoms worsen or fail to improve.

## 2023-06-12 ENCOUNTER — OFFICE VISIT (OUTPATIENT)
Dept: UROLOGY | Facility: CLINIC | Age: 73
End: 2023-06-12
Payer: MEDICARE

## 2023-06-12 VITALS
BODY MASS INDEX: 30.51 KG/M2 | DIASTOLIC BLOOD PRESSURE: 63 MMHG | HEART RATE: 74 BPM | HEIGHT: 69 IN | SYSTOLIC BLOOD PRESSURE: 103 MMHG | WEIGHT: 206 LBS

## 2023-06-12 DIAGNOSIS — R97.20 ELEVATED PSA, LESS THAN 10 NG/ML: Primary | ICD-10-CM

## 2023-06-12 DIAGNOSIS — N40.1 LOWER URINARY TRACT SYMPTOMS DUE TO BENIGN PROSTATIC HYPERPLASIA: ICD-10-CM

## 2023-06-12 PROCEDURE — 3078F DIAST BP <80 MM HG: CPT | Mod: CPTII,S$GLB,, | Performed by: UROLOGY

## 2023-06-12 PROCEDURE — 3288F PR FALLS RISK ASSESSMENT DOCUMENTED: ICD-10-PCS | Mod: CPTII,S$GLB,, | Performed by: UROLOGY

## 2023-06-12 PROCEDURE — 4010F PR ACE/ARB THEARPY RXD/TAKEN: ICD-10-PCS | Mod: CPTII,S$GLB,, | Performed by: UROLOGY

## 2023-06-12 PROCEDURE — 3061F PR NEG MICROALBUMINURIA RESULT DOCUMENTED/REVIEW: ICD-10-PCS | Mod: CPTII,S$GLB,, | Performed by: UROLOGY

## 2023-06-12 PROCEDURE — 3066F PR DOCUMENTATION OF TREATMENT FOR NEPHROPATHY: ICD-10-PCS | Mod: CPTII,S$GLB,, | Performed by: UROLOGY

## 2023-06-12 PROCEDURE — 99999 PR PBB SHADOW E&M-EST. PATIENT-LVL III: ICD-10-PCS | Mod: PBBFAC,,, | Performed by: UROLOGY

## 2023-06-12 PROCEDURE — 1160F PR REVIEW ALL MEDS BY PRESCRIBER/CLIN PHARMACIST DOCUMENTED: ICD-10-PCS | Mod: CPTII,S$GLB,, | Performed by: UROLOGY

## 2023-06-12 PROCEDURE — 1159F PR MEDICATION LIST DOCUMENTED IN MEDICAL RECORD: ICD-10-PCS | Mod: CPTII,S$GLB,, | Performed by: UROLOGY

## 2023-06-12 PROCEDURE — 1101F PR PT FALLS ASSESS DOC 0-1 FALLS W/OUT INJ PAST YR: ICD-10-PCS | Mod: CPTII,S$GLB,, | Performed by: UROLOGY

## 2023-06-12 PROCEDURE — 3008F BODY MASS INDEX DOCD: CPT | Mod: CPTII,S$GLB,, | Performed by: UROLOGY

## 2023-06-12 PROCEDURE — 3074F SYST BP LT 130 MM HG: CPT | Mod: CPTII,S$GLB,, | Performed by: UROLOGY

## 2023-06-12 PROCEDURE — 3008F PR BODY MASS INDEX (BMI) DOCUMENTED: ICD-10-PCS | Mod: CPTII,S$GLB,, | Performed by: UROLOGY

## 2023-06-12 PROCEDURE — 4010F ACE/ARB THERAPY RXD/TAKEN: CPT | Mod: CPTII,S$GLB,, | Performed by: UROLOGY

## 2023-06-12 PROCEDURE — 99214 PR OFFICE/OUTPT VISIT, EST, LEVL IV, 30-39 MIN: ICD-10-PCS | Mod: S$GLB,,, | Performed by: UROLOGY

## 2023-06-12 PROCEDURE — 99999 PR PBB SHADOW E&M-EST. PATIENT-LVL III: CPT | Mod: PBBFAC,,, | Performed by: UROLOGY

## 2023-06-12 PROCEDURE — 3061F NEG MICROALBUMINURIA REV: CPT | Mod: CPTII,S$GLB,, | Performed by: UROLOGY

## 2023-06-12 PROCEDURE — 1159F MED LIST DOCD IN RCRD: CPT | Mod: CPTII,S$GLB,, | Performed by: UROLOGY

## 2023-06-12 PROCEDURE — 1101F PT FALLS ASSESS-DOCD LE1/YR: CPT | Mod: CPTII,S$GLB,, | Performed by: UROLOGY

## 2023-06-12 PROCEDURE — 1157F PR ADVANCE CARE PLAN OR EQUIV PRESENT IN MEDICAL RECORD: ICD-10-PCS | Mod: CPTII,S$GLB,, | Performed by: UROLOGY

## 2023-06-12 PROCEDURE — 3044F PR MOST RECENT HEMOGLOBIN A1C LEVEL <7.0%: ICD-10-PCS | Mod: CPTII,S$GLB,, | Performed by: UROLOGY

## 2023-06-12 PROCEDURE — 1125F AMNT PAIN NOTED PAIN PRSNT: CPT | Mod: CPTII,S$GLB,, | Performed by: UROLOGY

## 2023-06-12 PROCEDURE — 1125F PR PAIN SEVERITY QUANTIFIED, PAIN PRESENT: ICD-10-PCS | Mod: CPTII,S$GLB,, | Performed by: UROLOGY

## 2023-06-12 PROCEDURE — 1157F ADVNC CARE PLAN IN RCRD: CPT | Mod: CPTII,S$GLB,, | Performed by: UROLOGY

## 2023-06-12 PROCEDURE — 3288F FALL RISK ASSESSMENT DOCD: CPT | Mod: CPTII,S$GLB,, | Performed by: UROLOGY

## 2023-06-12 PROCEDURE — 99214 OFFICE O/P EST MOD 30 MIN: CPT | Mod: S$GLB,,, | Performed by: UROLOGY

## 2023-06-12 PROCEDURE — 1160F RVW MEDS BY RX/DR IN RCRD: CPT | Mod: CPTII,S$GLB,, | Performed by: UROLOGY

## 2023-06-12 PROCEDURE — 3078F PR MOST RECENT DIASTOLIC BLOOD PRESSURE < 80 MM HG: ICD-10-PCS | Mod: CPTII,S$GLB,, | Performed by: UROLOGY

## 2023-06-12 PROCEDURE — 3066F NEPHROPATHY DOC TX: CPT | Mod: CPTII,S$GLB,, | Performed by: UROLOGY

## 2023-06-12 PROCEDURE — 3074F PR MOST RECENT SYSTOLIC BLOOD PRESSURE < 130 MM HG: ICD-10-PCS | Mod: CPTII,S$GLB,, | Performed by: UROLOGY

## 2023-06-12 PROCEDURE — 3044F HG A1C LEVEL LT 7.0%: CPT | Mod: CPTII,S$GLB,, | Performed by: UROLOGY

## 2023-06-12 NOTE — PROGRESS NOTES
[place] - Urology   Clinic Note    SUBJECTIVE:     Chief Complaint   Patient presents with    Other     [facility] health  F/U        Referral from: No ref. provider found.    History of Present Illness:  Kanu Carrero is a 73 y.o. male who presents to clinic for evaluation of elevated PSA.      Patient here for evaluation of elevated PSA.  Patient is unsure what his PSA had been previously.  His PSA today is 5.8.  Does report bothersome lower urinary tract symptoms including nocturia every 2 hours, urgency frequency weak stream and incomplete emptying.  Does not take any medications for this.  He is on chronic anticoagulation with warfarin due to previous valve replacements.    PSA:  Lab Results   Component Value Date    PSA 5.8 (H) 12/06/2022    PSADIAG 3.9 05/15/2023       Previously abnormal PSA: no.   Previous biopsy: no.   Family history of prostate cancer: no.      06/12/2023  Here for follow up. Voiding symptoms significantly improved since starting tamsulosin. PSA down to 3.9    PSA:  Lab Results   Component Value Date    PSA 5.8 (H) 12/06/2022    PSADIAG 3.9 05/15/2023       Patient endorses no additional complaints at this time.    Anticoagulation/Antiplatelets:  Yes; Warfarin    Past Medical History:   Diagnosis Date    Aortic valve disorders 6/7/2013 5/22/2013: Echo. Kjellgren. Mild AS 3.0m/s.    Back pain     CAD (coronary artery disease)     Chronic anticoagulation 3/12/2014    Warfarin for mechanical aortic valve. Goal INR 2-3. Anticoagulation managed by Elie.     Coronary artery disease 6/7/2013 6/8/2010. Cath. [facility]. LM 40%. Patent RCA stent. EF 40-45%.    Diabetes mellitus, type 2 4/4/2014    Diagnosed 3/2014.    Gastroesophageal reflux disease 8/19/2016    2008: Diagnosed.    Heart failure, systolic, chronic 6/7/2013    Hx of EF 40-45%. 5/22/2013: Echo. Kjellgren. EF 50-55%.    History of heart valve replacement 3/11/2014    3/11/2014: AVR, [facility] [place]: [facility] [facility] 23 mm Mechanical Valve.    History  of PTCA 6/7/2013 6/8/2010. Cath. [facility]. LM 40%. Patent RCA stent. EF 40-45%. 2008: Stent RCA.    Hypercholesterolemia 6/7/2013    Hyperlipidemia     Hypertension     Hypertension, malignant 6/6/2014    Diagnosed 2005.    LBBB (left bundle branch block) 6/7/2013    Diagnosed 2005.    Mild obesity 5/5/2017 5/5/2017: Weight 97 kg. BMI 32.    Peptic ulcer        Past Surgical History:   Procedure Laterality Date    CORONARY STENT PLACEMENT  2008    rca    HAND SURGERY  1957    first digit of third & fourth fingers of right hand removed    TONSILLECTOMY, ADENOIDECTOMY      VASECTOMY         History reviewed. No pertinent family history.    Social History     Tobacco Use    Smoking status: Never    Smokeless tobacco: Never   Substance Use Topics    Alcohol use: No    Drug use: No       Current Outpatient Medications on File Prior to Visit   Medication Sig Dispense Refill    acetaminophen (TYLENOL) 500 MG tablet Take 500 mg by mouth every 6 (six) hours as needed for Pain.      allopurinoL (ZYLOPRIM) 300 MG tablet Take 300 mg by mouth.      amLODIPine (NORVASC) 10 MG tablet Take 1 tablet (10 mg total) by mouth once daily. 90 tablet 3    chlorthalidone (HYGROTEN) 25 MG Tab Take 1 tablet (25 mg total) by mouth once daily. 90 tablet 3    cyanocobalamin (VITAMIN B-12) 100 MCG tablet Take 100 mcg by mouth once daily.      empagliflozin (JARDIANCE) 10 mg tablet Take 1 tablet (10 mg total) by mouth once daily. 90 tablet 3    EScitalopram oxalate (LEXAPRO) 10 MG tablet TAKE 1 TABLET BY MOUTH DAILY 90 tablet 4    ezetimibe (ZETIA) 10 mg tablet Take 1 tablet (10 mg total) by mouth once daily. 90 tablet 3    ferrous sulfate (FEOSOL) 325 mg (65 mg iron) Tab tablet Take 1 tablet (325 mg total) by mouth daily with breakfast. 90 tablet 3    MULTIVITAMIN W-MINERALS/LUTEIN (CENTRUM SILVER ORAL) Take 1 tablet by mouth once daily.      omeprazole (PRILOSEC) 20 MG capsule Take 20 mg by mouth once daily.   11    ramipriL (ALTACE) 10 MG  "capsule Take 1 capsule (10 mg total) by mouth once daily. 90 capsule 3    rosuvastatin (CRESTOR) 20 MG tablet Take 1 tablet (20 mg total) by mouth once daily. 90 tablet 3    tamsulosin (FLOMAX) 0.4 mg Cap Take 1 capsule (0.4 mg total) by mouth once daily. 90 capsule 3    vitamin D (VITAMIN D3) 1000 units Tab Take 1,000 Units by mouth once daily.      warfarin (JANTOVEN) 3 MG tablet DOSE TO BE ADJUSTED ACCORDING TO INTERNATIONAL NORMALIZED RATIO. 140 tablet 3     No current facility-administered medications on file prior to visit.       Review of patient's allergies indicates:   Allergen Reactions    Beta-blockers (beta-adrenergic blocking agts) Other (See Comments)      3/1/2019: Metoprolol was discontinued due to HR 46 bpm.        Review of Systems:  A review of 10+ systems was conducted with pertinent positive and negative findings documented in HPI with all other systems reviewed and negative.    OBJECTIVE:     Estimated body mass index is 30.42 kg/m² as calculated from the following:    Height as of this encounter: 5' 9" (1.753 m).    Weight as of this encounter: 93.5 kg (206 lb 0.3 oz).    Vital Signs (Most Recent)  Vitals:    06/12/23 1102   BP: 103/63   Pulse: 74       Physical Exam:  GENERAL: patient sitting comfortably  HEENT: normocephalic  NECK: supple, no JVD  PULM: normal chest rise, no increased WOB  HEART: non-diaphoretic  ABDO: soft, nondistended, nontender  BACK: no CVA tenderness bilaterally  SKIN: warm, dry, well perfused  EXT: no bruising or edema  NEURO: grossly normal with no focal deficits  PSYCH: appropriate mood and affect    Genitourinary Exam:  NOA: appropriate sphincter tone, smooth, non-rubbery, rubbery prostate w/o nodules    Lab Results   Component Value Date    BUN 25 (H) 06/02/2023    CREATININE 1.0 06/02/2023    WBC 7.05 06/02/2023    HGB 13.7 (L) 06/02/2023    HCT 44.2 06/02/2023     06/02/2023    AST 22 06/02/2023    ALT 30 06/02/2023    ALKPHOS 60 06/02/2023    ALBUMIN 4.5 " 06/02/2023    HGBA1C 5.7 (H) 06/02/2023        PSA:  Lab Results   Component Value Date    PSA 5.8 (H) 12/06/2022    PSADIAG 3.9 05/15/2023       Testosterone:  No results found for: TOTALTESTOST, TOTALTESTOST, TESTOSTERONE     Imaging:  I have personally reviewed all relevant imaging.    No results found for this or any previous visit (from the past 2160 hour(s)).  No results found for this or any previous visit (from the past 2160 hour(s)).  Echo Saline Bubble? No  · The left ventricle is normal in size with low normal systolic function.  · There is abnormal septal wall motion consistent with left bundle branch   block.  · Mild left atrial enlargement.  · There are segmental left ventricular wall motion abnormalities. The   apical septum is mildly hypokinetic.  · The estimated ejection fraction is 50%.  · Grade I left ventricular diastolic dysfunction.  · Normal right ventricular size with normal right ventricular systolic   function.  · There is a bileaflet tilting disc mechanical aortic valve present. There   is no aortic insufficiency present. Prosthetic aortic valve is normal.  · The aortic valve mean gradient is 7 mmHg with a dimensionless index of   0.95.  · Mild tricuspid regurgitation.  · The estimated PA systolic pressure is 29 mmHg.  · Normal central venous pressure (3 mmHg).         ASSESSMENT     1. Elevated PSA, less than 10 ng/ml    2. Lower urinary tract symptoms due to benign prostatic hyperplasia        PLAN:     Elevated PSA    - PSA normalized today. Will continue to follow with PSA in 6 months per patient preference.    2. BPH with LUTS    - Continue tamsulosin. Symptoms improved since starting medication    RTC 6 months    Kem Donnelly MD  Urology  [facility] [place] [facility] [place]    Disclaimer: This note has been generated using voice-recognition software. There may be typographical errors that have been missed during proof-reading.

## 2023-06-14 ENCOUNTER — LAB VISIT (OUTPATIENT)
Dept: LAB | Facility: HOSPITAL | Age: 73
End: 2023-06-14
Attending: INTERNAL MEDICINE
Payer: MEDICARE

## 2023-06-14 DIAGNOSIS — Z95.2 HISTORY OF HEART VALVE REPLACEMENT: ICD-10-CM

## 2023-06-14 DIAGNOSIS — Z79.01 CHRONIC ANTICOAGULATION: ICD-10-CM

## 2023-06-14 LAB
INR PPP: 3 (ref 0.8–1.2)
PROTHROMBIN TIME: 29.8 SEC (ref 9–12.5)

## 2023-06-14 PROCEDURE — 36415 COLL VENOUS BLD VENIPUNCTURE: CPT | Mod: PO | Performed by: INTERNAL MEDICINE

## 2023-06-14 PROCEDURE — 85610 PROTHROMBIN TIME: CPT | Performed by: INTERNAL MEDICINE

## 2023-06-15 ENCOUNTER — TELEPHONE (OUTPATIENT)
Dept: CARDIOLOGY | Facility: CLINIC | Age: 73
End: 2023-06-15
Payer: MEDICARE

## 2023-06-15 NOTE — TELEPHONE ENCOUNTER
Pt notified and verbalized understanding.      ----- Message from Sharad Delgadillo MD sent at 6/14/2023  4:50 PM CDT -----  Is he taking warfarin as above? If that is the case: Increase: 12 mg today. Then 4.5 mg 7/7     3/24/2023: 1.7.     Continue same dose: Warfarin 4.5 mg 7/7.     3/901966: INR 2.2.     Continue same dose: Warfarin 4.5 mg 7/7.     4/13/2023: INR 2.3.     Continue same dose: Warfarin 4.5 mg 7/7.     5/17/2023: INR 3.1.     Continue same dose: Warfarin 4.5 mg 7/7.     5/31/2023: INR 3.3.    Continue same dose: Warfarin 4.5 mg 7/7.    6/14/2023: INR 3.0.    Continue same dose: Warfarin 4.5 mg 7/7.     In 4 weeks: Next INR.     Sharad Delgadillo M.D.

## 2023-07-14 DIAGNOSIS — Z79.01 CHRONIC ANTICOAGULATION: ICD-10-CM

## 2023-07-14 DIAGNOSIS — Z95.2 HISTORY OF HEART VALVE REPLACEMENT: Primary | ICD-10-CM

## 2023-07-19 ENCOUNTER — LAB VISIT (OUTPATIENT)
Dept: LAB | Facility: HOSPITAL | Age: 73
End: 2023-07-19
Attending: INTERNAL MEDICINE
Payer: MEDICARE

## 2023-07-19 DIAGNOSIS — Z95.2 HISTORY OF HEART VALVE REPLACEMENT: ICD-10-CM

## 2023-07-19 DIAGNOSIS — Z79.01 CHRONIC ANTICOAGULATION: ICD-10-CM

## 2023-07-19 LAB
INR PPP: 3.1 (ref 0.8–1.2)
PROTHROMBIN TIME: 30.9 SEC (ref 9–12.5)

## 2023-07-19 PROCEDURE — 85610 PROTHROMBIN TIME: CPT | Mod: HCNC | Performed by: INTERNAL MEDICINE

## 2023-07-19 PROCEDURE — 36415 COLL VENOUS BLD VENIPUNCTURE: CPT | Mod: HCNC,PO | Performed by: INTERNAL MEDICINE

## 2023-07-20 ENCOUNTER — TELEPHONE (OUTPATIENT)
Dept: CARDIOLOGY | Facility: CLINIC | Age: 73
End: 2023-07-20
Payer: MEDICARE

## 2023-07-20 NOTE — TELEPHONE ENCOUNTER
Pt notified and verbalized understanding.    ----- Message from Sharad Delgadillo MD sent at 7/20/2023 12:30 PM CDT -----  Continue same dose: Warfarin 4.5 mg 7/7.     5/31/2023: INR 3.3.     Continue same dose: Warfarin 4.5 mg 7/7.     6/14/2023: INR 3.0.     Continue same dose: Warfarin 4.5 mg 7/7.     7/19/2023: INR 3.1.    Reduce current dose: Warfarin 4.5 mg 5/7 and 3 mg 2/7.      In 2 weeks: Next INR.     Sharad Delgadillo M.D.

## 2023-07-31 ENCOUNTER — OFFICE VISIT (OUTPATIENT)
Dept: CARDIOLOGY | Facility: CLINIC | Age: 73
End: 2023-07-31
Attending: INTERNAL MEDICINE
Payer: COMMERCIAL

## 2023-07-31 VITALS
BODY MASS INDEX: 30.1 KG/M2 | OXYGEN SATURATION: 97 % | WEIGHT: 203.25 LBS | HEIGHT: 69 IN | SYSTOLIC BLOOD PRESSURE: 114 MMHG | HEART RATE: 63 BPM | DIASTOLIC BLOOD PRESSURE: 62 MMHG

## 2023-07-31 DIAGNOSIS — E78.00 HYPERCHOLESTEROLEMIA: ICD-10-CM

## 2023-07-31 DIAGNOSIS — I35.9 AORTIC VALVE DISEASE: ICD-10-CM

## 2023-07-31 DIAGNOSIS — I25.10 CORONARY ARTERY DISEASE INVOLVING NATIVE CORONARY ARTERY OF NATIVE HEART WITHOUT ANGINA PECTORIS: ICD-10-CM

## 2023-07-31 DIAGNOSIS — Z95.2 HISTORY OF HEART VALVE REPLACEMENT: ICD-10-CM

## 2023-07-31 DIAGNOSIS — K21.9 GASTROESOPHAGEAL REFLUX DISEASE WITHOUT ESOPHAGITIS: ICD-10-CM

## 2023-07-31 DIAGNOSIS — Z95.5 HISTORY OF CORONARY ARTERY STENT PLACEMENT: ICD-10-CM

## 2023-07-31 DIAGNOSIS — E11.59 TYPE 2 DIABETES MELLITUS WITH OTHER CIRCULATORY COMPLICATION, WITHOUT LONG-TERM CURRENT USE OF INSULIN: ICD-10-CM

## 2023-07-31 DIAGNOSIS — E66.3 OVERWEIGHT: ICD-10-CM

## 2023-07-31 DIAGNOSIS — I44.7 LEFT BUNDLE BRANCH BLOCK: ICD-10-CM

## 2023-07-31 DIAGNOSIS — Z79.01 CHRONIC ANTICOAGULATION: ICD-10-CM

## 2023-07-31 DIAGNOSIS — I10 PRIMARY HYPERTENSION: ICD-10-CM

## 2023-07-31 DIAGNOSIS — I50.22 HEART FAILURE, SYSTOLIC, CHRONIC: ICD-10-CM

## 2023-07-31 PROCEDURE — 99214 PR OFFICE/OUTPT VISIT, EST, LEVL IV, 30-39 MIN: ICD-10-PCS | Mod: S$GLB,,, | Performed by: INTERNAL MEDICINE

## 2023-07-31 PROCEDURE — 99214 OFFICE O/P EST MOD 30 MIN: CPT | Mod: S$GLB,,, | Performed by: INTERNAL MEDICINE

## 2023-07-31 PROCEDURE — 99999 PR PBB SHADOW E&M-EST. PATIENT-LVL IV: ICD-10-PCS | Mod: PBBFAC,,, | Performed by: INTERNAL MEDICINE

## 2023-07-31 PROCEDURE — 99999 PR PBB SHADOW E&M-EST. PATIENT-LVL IV: CPT | Mod: PBBFAC,,, | Performed by: INTERNAL MEDICINE

## 2023-07-31 NOTE — PROGRESS NOTES
Subjective:     Kanu Carrero is a 73 y.o.male with hypertension, hypercholesterolemia and diabetes mellitus type 2. He is mildly obese. He has coronary artery disease with mild left main disease and a history of an intervention of the right coronary artery in 2008. He has left bundle branch block. He had moderate to severe aortic stenosis. In the fall of 2013 developed mild exertional chest pressure. He underwent cardiac catheterization on 1/30/2014 which revealed an aortic valve area of 1.1 cm2. No obstructive coronary artery disease was seen with an about 40% left main stenosis. There was mild left ventricular dysfunction. He underwent aortic valve replacement receiving a [employer]. Chris mechanical valve on 3/11/2014. He was begun on warfarin with anticoagulation managed by Dr. Delgadillo. He was diagnosed with iron deficiency anemia in 10/2016. He underwent an esophagogastroduodenoscopy and colonoscopy that he said were negative. It was decided to discontine aspirin that he was then taking in addition to being anticoagulated. On 11/4/2022 he began empagliflozin. On 12/19/2022 he underwent arthroscopic surgery of the left knee. No exertional chest discomfort or exertional dyspnea. No palpitations or weak spells. No bleeding. No issues with any of his prescribed medications. Feeling well overall.       Coronary Artery Disease  Presents for follow-up visit. The disease course has been stable. The condition has lasted for  years. Pertinent negatives include no chest pain, chest pressure, chest tightness, dizziness, leg swelling, muscle weakness, palpitations, shortness of breath or weight gain. Risk factors include diabetes, hyperlipidemia, hypertension and obesity. Risk factors do not include decreased physical activity. His past medical history is significant for CHF. There is no history of arrhythmia. The symptoms have been stable.   Congestive Heart Failure  Presents for follow-up visit. The disease course has been  stable. The condition has lasted for  years. Pertinent negatives include no abdominal pain, chest pain, chest pressure, claudication, edema, fatigue, muscle weakness, near-syncope, nocturia, orthopnea, palpitations, paroxysmal nocturnal dyspnea, shortness of breath or unexpected weight change. The symptoms have been stable. His past medical history is significant for CAD, DM and HTN. There is no history of arrhythmia or chronic lung disease.   Hypertension  This is a chronic problem. The current episode started more than 1 year ago. The problem is unchanged. The problem is controlled (usually 115-125/55-60 mmHg at home). Pertinent negatives include no anxiety, blurred vision, chest pain, headaches, malaise/fatigue, neck pain, orthopnea, palpitations, peripheral edema, PND, shortness of breath or sweats. There is no history of chronic renal disease.   Hyperlipidemia  This is a chronic problem. The current episode started more than 1 year ago. The problem is controlled. Recent lipid tests were reviewed and are variable. Exacerbating diseases include diabetes and obesity. He has no history of chronic renal disease, hypothyroidism, liver disease or nephrotic syndrome. Pertinent negatives include no chest pain, focal sensory loss, focal weakness, leg pain, myalgias or shortness of breath.       Review of Systems   Constitutional: Negative for chills, fatigue, fever, malaise/fatigue, unexpected weight change and weight gain.   HENT:  Negative for hoarse voice and nosebleeds.    Eyes:  Negative for blurred vision, pain, vision loss in left eye and vision loss in right eye.   Cardiovascular:  Negative for chest pain, claudication, dyspnea on exertion, irregular heartbeat, leg swelling, near-syncope, orthopnea, palpitations, paroxysmal nocturnal dyspnea and syncope.   Respiratory:  Negative for chest tightness, cough, hemoptysis, shortness of breath and wheezing.    Endocrine: Negative for cold intolerance and heat  intolerance.   Hematologic/Lymphatic: Negative for bleeding problem. Does not bruise/bleed easily.   Skin:  Negative for color change and rash.   Musculoskeletal:  Negative for back pain, falls, gout, muscle weakness, myalgias and neck pain.   Gastrointestinal:  Negative for abdominal pain, heartburn, hematemesis, hematochezia, hemorrhoids, jaundice, melena, nausea and vomiting.   Genitourinary:  Negative for dysuria, hematuria and nocturia.   Neurological:  Negative for dizziness, focal weakness, headaches, light-headedness, loss of balance, numbness, tremors and vertigo.   Psychiatric/Behavioral:  Negative for altered mental status, depression and memory loss. The patient is not nervous/anxious.    Allergic/Immunologic: Negative for hives and persistent infections.         Current Outpatient Medications on File Prior to Visit   Medication Sig Dispense Refill    acetaminophen (TYLENOL) 500 MG tablet Take 500 mg by mouth every 6 (six) hours as needed for Pain.      allopurinoL (ZYLOPRIM) 300 MG tablet Take 300 mg by mouth.      amLODIPine (NORVASC) 10 MG tablet Take 1 tablet (10 mg total) by mouth once daily. 90 tablet 3    chlorthalidone (HYGROTEN) 25 MG Tab Take 1 tablet (25 mg total) by mouth once daily. 90 tablet 3    cyanocobalamin (VITAMIN B-12) 100 MCG tablet Take 100 mcg by mouth once daily.      empagliflozin (JARDIANCE) 10 mg tablet Take 1 tablet (10 mg total) by mouth once daily. 90 tablet 3    EScitalopram oxalate (LEXAPRO) 10 MG tablet TAKE 1 TABLET BY MOUTH DAILY 90 tablet 4    ezetimibe (ZETIA) 10 mg tablet Take 1 tablet (10 mg total) by mouth once daily. 90 tablet 3    ferrous sulfate (FEOSOL) 325 mg (65 mg iron) Tab tablet Take 1 tablet (325 mg total) by mouth daily with breakfast. 90 tablet 3    MULTIVITAMIN W-MINERALS/LUTEIN (CENTRUM SILVER ORAL) Take 1 tablet by mouth once daily.      omeprazole (PRILOSEC) 20 MG capsule Take 20 mg by mouth once daily.   11    ramipriL (ALTACE) 10 MG capsule Take  "1 capsule (10 mg total) by mouth once daily. 90 capsule 3    rosuvastatin (CRESTOR) 20 MG tablet Take 1 tablet (20 mg total) by mouth once daily. 90 tablet 3    tamsulosin (FLOMAX) 0.4 mg Cap Take 1 capsule (0.4 mg total) by mouth once daily. 90 capsule 3    vitamin D (VITAMIN D3) 1000 units Tab Take 1,000 Units by mouth once daily.      warfarin (JANTOVEN) 3 MG tablet DOSE TO BE ADJUSTED ACCORDING TO INTERNATIONAL NORMALIZED RATIO. 140 tablet 3     No current facility-administered medications on file prior to visit.       /62 (BP Location: Right arm, Patient Position: Sitting, BP Method: Large (Manual))   Pulse 63   Ht 5' 9" (1.753 m)   Wt 92.2 kg (203 lb 4.2 oz)   SpO2 97%   BMI 30.02 kg/m²       Objective:     Physical Exam  Constitutional:       General: He is not in acute distress.     Appearance: Normal appearance. He is well-developed. He is not ill-appearing or diaphoretic.   HENT:      Head: Normocephalic and atraumatic.      Nose: Nose normal.   Eyes:      General:         Right eye: No discharge.         Left eye: No discharge.      Conjunctiva/sclera:      Right eye: Right conjunctiva is not injected.      Left eye: Left conjunctiva is not injected.      Pupils: Pupils are equal.      Right eye: Pupil is round.      Left eye: Pupil is round.   Neck:      Thyroid: No thyroid mass or thyromegaly.      Vascular: No carotid bruit or JVD.   Cardiovascular:      Rate and Rhythm: Regular rhythm. Bradycardia present. No extrasystoles are present.     Chest Wall: PMI is not displaced.      Pulses:           Radial pulses are 2+ on the right side and 2+ on the left side.        Femoral pulses are 2+ on the right side and 2+ on the left side.       Dorsalis pedis pulses are 2+ on the right side and 2+ on the left side.        Posterior tibial pulses are 2+ on the right side and 2+ on the left side.      Heart sounds: S1 normal. Murmur heard.      Harsh midsystolic murmur is present with a grade of 3/6 at " the upper right sternal border. Mechanical S2.      No gallop.      Comments: Mechanical S2.  Pulmonary:      Effort: Pulmonary effort is normal.      Breath sounds: Normal breath sounds.   Abdominal:      Palpations: Abdomen is soft.      Tenderness: There is no abdominal tenderness.   Musculoskeletal:      Cervical back: Neck supple.      Right ankle: Swelling present. No deformity or ecchymosis.      Left ankle: Swelling present. No deformity or ecchymosis.   Lymphadenopathy:      Head:      Right side of head: No submandibular adenopathy.      Left side of head: No submandibular adenopathy.      Cervical: No cervical adenopathy.   Skin:     General: Skin is warm and dry.   Neurological:      General: No focal deficit present.      Mental Status: He is alert and oriented to person, place, and time. He is not disoriented.      Cranial Nerves: No cranial nerve deficit.   Psychiatric:         Attention and Perception: Attention and perception normal.         Mood and Affect: Mood and affect normal.         Speech: Speech normal.         Behavior: Behavior normal.         Thought Content: Thought content normal.         Cognition and Memory: Cognition and memory normal.         Judgment: Judgment normal.          Assessment:     1. Aortic valve disease    2. History of heart valve replacement    3. Chronic anticoagulation    4. Coronary artery disease involving native coronary artery of native heart without angina pectoris    5. History of coronary artery stent placement    6. Heart failure, systolic, chronic    7. Left bundle branch block    8. Primary hypertension    9. Hypercholesterolemia    10. Type 2 diabetes mellitus with other circulatory complication, without long-term current use of insulin    11. Overweight    12. Gastroesophageal reflux disease without esophagitis        Plan:     1. Aortic Valve Disease              5/22/2013: Echo: Mild AS 3.0 m/s.   Fall 2013: Mild to moderate exertional chest  pressure.   1/21/2014: Echo: Normal LV size with low normal LV function. EF 50-55%. LBBB. Moderate AS - 3.2 m/s - 1.2 cm2. Mild to moderate MR.              1/21/2014: Carotid Duplex: Mild plaquing.              1/30/2014: [facility] [place]: Cath: Severe aortic stenosis - 1.1 cm2. Mean gradient 47 mm Hg. Mild CAD. Mild LV dysfunction with EF 50%.              3/11/2014: [facility] [place]: AVR: [facility] [facility] Mechanical Valve - 23 mm.   5/28/2014: Echo: Mechanical aortic valve - 2.4 m/s - mild AR.   5/25/2021: Echo: Mechanical AVR - fine - MG 16 mmHg - DI 0.53.   On warfarin.   Knows about endocarditis prophylaxis.   Stable.    2. Chronic Anticoagulation   3/2014: Began warfarin for mechanical aortic valve. Goal INR 2-3. Anticoagulation managed by Dr. Delgadillo.    2018: Aspirin 81 mg was discontinued due to iron deficiency anemia.   On warfarin.   INR followed.   No aspirin or NSAIDs.    No obvious bleeding.    3. Coronary Artery Disease   2008: [facility]: Cath: RCA: Stent.   6/8/2010: [facility] [place]: Cath: LM 40%. RCA: Stent patent. EF 40-45%.              No aspirin as he has iron deficiency anemia and is anticoagulated.              Stable.    4. Heart Failure, Systolic and Diastolic, Chronic   2005: Diagnosed. EF 40-45%.   5/22/2013: Echo: EF 50-55%.   5/28/2014: Echo: Normal left ventricular size and function. Mild LVH. Mildly dilated LA. Mechanical aortic valve - 2.4 m/s - mild AR.   9/8/2016: Echo: Normal left ventricular size and systolic function. Mild LVH. Moderate diastolic dysfunction. LBBB. Moderately dilated LA. Mechanical AVR fine - 2.7 m/s.   5/25/2021: Echo: The left ventricle is mildly dilated with low normal systolic function. EF 50%. The mid anteroseptal wall and apical septum are moderately hypokinetic. The basal inferolateral wall is severely hypokinetic. The remainder contract well. LBBB. Mild diastolic dysfunction. Mildly dilated LA. Mechanical AVR - fine - MG 16 mmHg - DI 0.53.   6/5/2023: Echo: Normal left ventricular size with low  normal systolic function. LBBB. Apical septum mildly hypokinetic. EF 50%. Mild diastolic dysfunction. [facility] AVR - fine.    3/1/2019: Metoprolol was discontinued due to HR of 46 bpm.    11/4/2022: Empagliflozin 10 mg Q24 was begun.              On empagliflozin 10 mg Q24, ramipril 10 mg Q24 and chlorthalidone 25 mg Q24.   Well compensated.   5/2025: Plan next Echo. Then every few years.     5. Left Bundle Branch Block              2005: Diagnosed.   9/8/2017: SB. LBBB.  ms.   10/19/2018: SB 52. LBBB with  msec.   3/1/2019: HR 46 bpm.   7/5/2019: HR 68 bpm.   5/20/2021: HR 67.  ms. LBBB with  ms.   7/1/2022: ECG: LBBB.  ms.              Stable.                6. Hypertension              2005: Diagnosed.   3/1/2019: Metoprolol was discontinued due to HR of 46 bpm.    On amlodipine 10 mg Q24, ramipril 10 mg Q24 and chlorthalidone 25 mg Q24.   Leg edema resolved with diuretic.   Keeping log at home.   Well controlled.     7. Hypercholesterolemia   2007: Began statin.              On atorvastatin 80 mg Q24.   12/16/2016: Chol 134. HDL 35. LDL 74. .   May have had some muscle aches while on atorvastatin.    On rosuvastatin 10 mg Q24.   9/13/2017: Chol 158. HDL 33. LDL 96. .   On rosuvastatin 20 mg Q24.   9/14/2018: Chol 153. HDL 35. LDL 92. .   2/20/2019: Chol 151. HDL 35. LDL 86. .   7/9/2020: Chol 169. HDL 40. LDL 95. .   On rosuvastatin 20 mg Q24.   9/16/2020: Ezetimibe 10 mg Q24 was begun.   On rosuvastatin 20 mg Q24 and ezetimibe 10 mg Q24    11/18/2020: Chol 109. HDL 34. LDL 50. .   6/3/2021: Chol 112. HDL 33. LDL 49. .   6/3/2022: Chol 113. HDL 41. LDL 48. .   12/1/2022: Chol 129. HDL 38. LDL 65. .   6/2/2023: Chol 117. HDL 33. LDL 52. .   On rosuvastatin 20 mg Q24 and ezetimibe 10 mg Q24    Very favorable lipid panel.     8. Diabetes Mellitus, Type 2   3/2014: Diagnosed. Complications: CAD. Medications: Oral  agent.   11/4/2022: Empagliflozin 10 mg Q24 was begun for HF. Metformin 500 mg Q12 was discontinued.    6/3/2022: HgbA1C 5.8%.   12/1/2022: HgbA1C 5.9%.   On empagliflozin 10 mg Q24 that was initiated for HF.    Well controlled.     9. Overweight   5/5/2017: Weight 97 kg. BMI 32.   9/8/2017: Weight 95 kg. BMI 31.   6/1/2018: Weight 92 kg. BMI 30.   10/19/2018: Weight 89 kg. BMI 29.   3/1/2019: Weight 94 kg. BMI 31.   11/1/2019: Weight 92 kg. BMI 30.   9/16/2020: Weight 96 kg. BMI 31.   3/13/2023: Weight 89 kg. BMI 29.    Encouraged to lose weight.    10. Gastroesophageal Reflux Disease   2008: Diagnosed.   On omeprazole 20 mg Q24.    11. Anemia   10/2016: Diagnosed with iron deficiency anemia.   10/4/2016: EGD & Colonoscopy: Negative.   Receiving iron.    12. History of Knee Surgery   12/19/2022: Arthroscopic surgery of the left knee.    13. Primary Care              Dr. Olvin Hanley.    F/u 4 months.    Sharad Delgadillo M.D.      7/31/2023 5:30 PM, Addendum:        I discussed the above test result and the implications of the findings over the phone.    Sharad Delgadillo M.D.

## 2023-08-03 ENCOUNTER — LAB VISIT (OUTPATIENT)
Dept: LAB | Facility: HOSPITAL | Age: 73
End: 2023-08-03
Attending: INTERNAL MEDICINE
Payer: MEDICARE

## 2023-08-03 DIAGNOSIS — Z95.2 HISTORY OF HEART VALVE REPLACEMENT: ICD-10-CM

## 2023-08-03 DIAGNOSIS — Z79.01 CHRONIC ANTICOAGULATION: ICD-10-CM

## 2023-08-03 LAB
INR PPP: 2 (ref 0.8–1.2)
PROTHROMBIN TIME: 20.9 SEC (ref 9–12.5)

## 2023-08-03 PROCEDURE — 36415 COLL VENOUS BLD VENIPUNCTURE: CPT | Mod: HCNC,PO | Performed by: INTERNAL MEDICINE

## 2023-08-03 PROCEDURE — 85610 PROTHROMBIN TIME: CPT | Mod: HCNC | Performed by: INTERNAL MEDICINE

## 2023-08-21 ENCOUNTER — LAB VISIT (OUTPATIENT)
Dept: LAB | Facility: HOSPITAL | Age: 73
End: 2023-08-21
Attending: INTERNAL MEDICINE
Payer: COMMERCIAL

## 2023-08-21 DIAGNOSIS — Z95.2 HISTORY OF HEART VALVE REPLACEMENT: ICD-10-CM

## 2023-08-21 DIAGNOSIS — Z79.01 CHRONIC ANTICOAGULATION: ICD-10-CM

## 2023-08-21 LAB
INR PPP: 2.8 (ref 0.8–1.2)
PROTHROMBIN TIME: 28.2 SEC (ref 9–12.5)

## 2023-08-21 PROCEDURE — 85610 PROTHROMBIN TIME: CPT | Mod: HCNC | Performed by: INTERNAL MEDICINE

## 2023-08-21 PROCEDURE — 36415 COLL VENOUS BLD VENIPUNCTURE: CPT | Mod: HCNC,PO | Performed by: INTERNAL MEDICINE

## 2023-08-22 ENCOUNTER — TELEPHONE (OUTPATIENT)
Dept: CARDIOLOGY | Facility: CLINIC | Age: 73
End: 2023-08-22
Payer: MEDICARE

## 2023-08-22 NOTE — TELEPHONE ENCOUNTER
Pt notified and verbalized understanding.      ----- Message from Sharad Delgadillo MD sent at 8/21/2023  7:07 PM CDT -----  Continue same dose: Warfarin 4.5 mg 7/7.     5/31/2023: INR 3.3.     Continue same dose: Warfarin 4.5 mg 7/7.     6/14/2023: INR 3.0.     Continue same dose: Warfarin 4.5 mg 7/7.      7/19/2023: INR 3.1.     Reduce current dose: Warfarin 4.5 mg 5/7 and 3 mg 2/7.      8/3/2023: INR 2.0.     Continue current dose: Warfarin 4.5 mg 5/7 and 3 mg 2/7.     8/21/2023: INR 2.8.    Continue current dose: Warfarin 4.5 mg 5/7 and 3 mg 2/7.      In 4 weeks: Next INR.     Sharad Delgadillo M.D.

## 2023-09-18 ENCOUNTER — LAB VISIT (OUTPATIENT)
Dept: LAB | Facility: HOSPITAL | Age: 73
End: 2023-09-18
Attending: INTERNAL MEDICINE
Payer: COMMERCIAL

## 2023-09-18 DIAGNOSIS — Z79.01 CHRONIC ANTICOAGULATION: ICD-10-CM

## 2023-09-18 DIAGNOSIS — Z95.2 HISTORY OF HEART VALVE REPLACEMENT: ICD-10-CM

## 2023-09-18 LAB
INR PPP: 2.4 (ref 0.8–1.2)
PROTHROMBIN TIME: 24.8 SEC (ref 9–12.5)

## 2023-09-18 PROCEDURE — 36415 COLL VENOUS BLD VENIPUNCTURE: CPT | Mod: PO | Performed by: INTERNAL MEDICINE

## 2023-09-18 PROCEDURE — 85610 PROTHROMBIN TIME: CPT | Performed by: INTERNAL MEDICINE

## 2023-09-19 ENCOUNTER — TELEPHONE (OUTPATIENT)
Dept: CARDIOLOGY | Facility: CLINIC | Age: 73
End: 2023-09-19
Payer: COMMERCIAL

## 2023-09-19 NOTE — TELEPHONE ENCOUNTER
Pt notified and verbalized understanding.    ----- Message from Sharad Delgadillo MD sent at 9/18/2023  5:16 PM CDT -----  5/31/2023: INR 3.3.     Continue same dose: Warfarin 4.5 mg 7/7.     6/14/2023: INR 3.0.     Continue same dose: Warfarin 4.5 mg 7/7.      7/19/2023: INR 3.1.     Reduce current dose: Warfarin 4.5 mg 5/7 and 3 mg 2/7.      8/3/2023: INR 2.0.     Continue current dose: Warfarin 4.5 mg 5/7 and 3 mg 2/7.      8/21/2023: INR 2.8.     Continue current dose: Warfarin 4.5 mg 5/7 and 3 mg 2/7.     9/18/2023: INR 2.4.    Continue current dose: Warfarin 4.5 mg 5/7 and 3 mg 2/7.      In 4 weeks: Next INR.     Sharad Delgadillo M.D.

## 2023-10-24 ENCOUNTER — LAB VISIT (OUTPATIENT)
Dept: LAB | Facility: HOSPITAL | Age: 73
End: 2023-10-24
Attending: INTERNAL MEDICINE
Payer: COMMERCIAL

## 2023-10-24 DIAGNOSIS — Z79.01 CHRONIC ANTICOAGULATION: ICD-10-CM

## 2023-10-24 DIAGNOSIS — Z95.2 HISTORY OF HEART VALVE REPLACEMENT: ICD-10-CM

## 2023-10-24 LAB
INR PPP: 2 (ref 0.8–1.2)
PROTHROMBIN TIME: 20.9 SEC (ref 9–12.5)

## 2023-10-24 PROCEDURE — 36415 COLL VENOUS BLD VENIPUNCTURE: CPT | Mod: PO | Performed by: INTERNAL MEDICINE

## 2023-10-24 PROCEDURE — 85610 PROTHROMBIN TIME: CPT | Performed by: INTERNAL MEDICINE

## 2023-10-25 ENCOUNTER — TELEPHONE (OUTPATIENT)
Dept: CARDIOLOGY | Facility: CLINIC | Age: 73
End: 2023-10-25
Payer: COMMERCIAL

## 2023-10-25 NOTE — TELEPHONE ENCOUNTER
Pt notified and verbalized understanding.    ----- Message from Sharad Delgadillo MD sent at 10/25/2023  8:04 AM CDT -----  8/3/2023: INR 2.0.     Continue current dose: Warfarin 4.5 mg 5/7 and 3 mg 2/7.      8/21/2023: INR 2.8.     Continue current dose: Warfarin 4.5 mg 5/7 and 3 mg 2/7.      9/18/2023: INR 2.4.     Continue current dose: Warfarin 4.5 mg 5/7 and 3 mg 2/7.     10/24/2023: INR 2.0.    Continue current dose: Warfarin 4.5 mg 5/7 and 3 mg 2/7.      In 4 weeks: Next INR.     Sharad Delgadillo M.D.

## 2023-11-29 ENCOUNTER — LAB VISIT (OUTPATIENT)
Dept: LAB | Facility: HOSPITAL | Age: 73
End: 2023-11-29
Attending: FAMILY MEDICINE
Payer: MEDICARE

## 2023-11-29 DIAGNOSIS — E78.00 HYPERCHOLESTEROLEMIA: ICD-10-CM

## 2023-11-29 DIAGNOSIS — D50.8 OTHER IRON DEFICIENCY ANEMIA: ICD-10-CM

## 2023-11-29 DIAGNOSIS — R73.03 PREDIABETES: ICD-10-CM

## 2023-11-29 DIAGNOSIS — F33.0 MILD EPISODE OF RECURRENT MAJOR DEPRESSIVE DISORDER: ICD-10-CM

## 2023-11-29 DIAGNOSIS — I25.10 CORONARY ARTERY DISEASE INVOLVING NATIVE CORONARY ARTERY OF NATIVE HEART WITHOUT ANGINA PECTORIS: ICD-10-CM

## 2023-11-29 LAB
ALBUMIN SERPL BCP-MCNC: 4.5 G/DL (ref 3.5–5.2)
ALP SERPL-CCNC: 74 U/L (ref 55–135)
ALT SERPL W/O P-5'-P-CCNC: 30 U/L (ref 10–44)
ANION GAP SERPL CALC-SCNC: 10 MMOL/L (ref 8–16)
AST SERPL-CCNC: 21 U/L (ref 10–40)
BASOPHILS # BLD AUTO: 0.08 K/UL (ref 0–0.2)
BASOPHILS NFR BLD: 0.9 % (ref 0–1.9)
BILIRUB SERPL-MCNC: 0.6 MG/DL (ref 0.1–1)
BUN SERPL-MCNC: 24 MG/DL (ref 8–23)
CALCIUM SERPL-MCNC: 9.8 MG/DL (ref 8.7–10.5)
CHLORIDE SERPL-SCNC: 101 MMOL/L (ref 95–110)
CHOLEST SERPL-MCNC: 123 MG/DL (ref 120–199)
CHOLEST/HDLC SERPL: 3.3 {RATIO} (ref 2–5)
CO2 SERPL-SCNC: 29 MMOL/L (ref 23–29)
CREAT SERPL-MCNC: 1 MG/DL (ref 0.5–1.4)
DIFFERENTIAL METHOD: ABNORMAL
EOSINOPHIL # BLD AUTO: 0 K/UL (ref 0–0.5)
EOSINOPHIL NFR BLD: 0 % (ref 0–8)
ERYTHROCYTE [DISTWIDTH] IN BLOOD BY AUTOMATED COUNT: 15.1 % (ref 11.5–14.5)
EST. GFR  (NO RACE VARIABLE): >60 ML/MIN/1.73 M^2
ESTIMATED AVG GLUCOSE: 114 MG/DL (ref 68–131)
GLUCOSE SERPL-MCNC: 110 MG/DL (ref 70–110)
HBA1C MFR BLD: 5.6 % (ref 4–5.6)
HCT VFR BLD AUTO: 43.6 % (ref 40–54)
HDLC SERPL-MCNC: 37 MG/DL (ref 40–75)
HDLC SERPL: 30.1 % (ref 20–50)
HGB BLD-MCNC: 14.4 G/DL (ref 14–18)
IMM GRANULOCYTES # BLD AUTO: 0.01 K/UL (ref 0–0.04)
IMM GRANULOCYTES NFR BLD AUTO: 0.1 % (ref 0–0.5)
LDLC SERPL CALC-MCNC: 66.8 MG/DL (ref 63–159)
LYMPHOCYTES # BLD AUTO: 1.5 K/UL (ref 1–4.8)
LYMPHOCYTES NFR BLD: 17 % (ref 18–48)
MCH RBC QN AUTO: 29 PG (ref 27–31)
MCHC RBC AUTO-ENTMCNC: 33 G/DL (ref 32–36)
MCV RBC AUTO: 88 FL (ref 82–98)
MONOCYTES # BLD AUTO: 0.7 K/UL (ref 0.3–1)
MONOCYTES NFR BLD: 7.6 % (ref 4–15)
NEUTROPHILS # BLD AUTO: 6.5 K/UL (ref 1.8–7.7)
NEUTROPHILS NFR BLD: 74.4 % (ref 38–73)
NONHDLC SERPL-MCNC: 86 MG/DL
NRBC BLD-RTO: 0 /100 WBC
PLATELET # BLD AUTO: 294 K/UL (ref 150–450)
PMV BLD AUTO: 9.8 FL (ref 9.2–12.9)
POTASSIUM SERPL-SCNC: 3.6 MMOL/L (ref 3.5–5.1)
PROT SERPL-MCNC: 7.7 G/DL (ref 6–8.4)
RBC # BLD AUTO: 4.96 M/UL (ref 4.6–6.2)
SODIUM SERPL-SCNC: 140 MMOL/L (ref 136–145)
TRIGL SERPL-MCNC: 96 MG/DL (ref 30–150)
TSH SERPL DL<=0.005 MIU/L-ACNC: 1.64 UIU/ML (ref 0.4–4)
WBC # BLD AUTO: 8.76 K/UL (ref 3.9–12.7)

## 2023-11-29 PROCEDURE — 80053 COMPREHEN METABOLIC PANEL: CPT | Mod: HCNC | Performed by: FAMILY MEDICINE

## 2023-11-29 PROCEDURE — 85025 COMPLETE CBC W/AUTO DIFF WBC: CPT | Mod: HCNC | Performed by: FAMILY MEDICINE

## 2023-11-29 PROCEDURE — 83036 HEMOGLOBIN GLYCOSYLATED A1C: CPT | Mod: HCNC | Performed by: FAMILY MEDICINE

## 2023-11-29 PROCEDURE — 84443 ASSAY THYROID STIM HORMONE: CPT | Mod: HCNC | Performed by: FAMILY MEDICINE

## 2023-11-29 PROCEDURE — 80061 LIPID PANEL: CPT | Mod: HCNC | Performed by: FAMILY MEDICINE

## 2023-11-30 ENCOUNTER — TELEPHONE (OUTPATIENT)
Dept: CARDIOLOGY | Facility: CLINIC | Age: 73
End: 2023-11-30
Payer: MEDICARE

## 2023-11-30 NOTE — TELEPHONE ENCOUNTER
Pt notified and verbalized understanding.    ----- Message from Sharad Delgadillo MD sent at 11/30/2023  9:30 AM CST -----  8/3/2023: INR 2.0.     Continue current dose: Warfarin 4.5 mg 5/7 and 3 mg 2/7.     8/21/2023: INR 2.8.     Continue current dose: Warfarin 4.5 mg 5/7 and 3 mg 2/7.     9/18/2023: INR 2.4.     Continue current dose: Warfarin 4.5 mg 5/7 and 3 mg 2/7.     10/24/2023: INR 2.0.     Continue current dose: Warfarin 4.5 mg 5/7 and 3 mg 2/7.    11/29/2023: INR 2.1.    Continue current dose: Warfarin 4.5 mg 5/7 and 3 mg 2/7.     In 4 weeks: Next INR.     Sharad Delgadillo M.D.

## 2023-12-07 ENCOUNTER — OFFICE VISIT (OUTPATIENT)
Dept: FAMILY MEDICINE | Facility: CLINIC | Age: 73
End: 2023-12-07
Payer: MEDICARE

## 2023-12-07 VITALS
DIASTOLIC BLOOD PRESSURE: 60 MMHG | BODY MASS INDEX: 30.14 KG/M2 | SYSTOLIC BLOOD PRESSURE: 96 MMHG | HEIGHT: 69 IN | OXYGEN SATURATION: 95 % | WEIGHT: 203.5 LBS | HEART RATE: 66 BPM

## 2023-12-07 DIAGNOSIS — R73.03 PREDIABETES: ICD-10-CM

## 2023-12-07 DIAGNOSIS — I10 PRIMARY HYPERTENSION: ICD-10-CM

## 2023-12-07 DIAGNOSIS — F33.1 MODERATE EPISODE OF RECURRENT MAJOR DEPRESSIVE DISORDER: ICD-10-CM

## 2023-12-07 DIAGNOSIS — Z23 NEEDS FLU SHOT: Primary | ICD-10-CM

## 2023-12-07 PROCEDURE — 99999 PR PBB SHADOW E&M-EST. PATIENT-LVL IV: CPT | Mod: PBBFAC,HCNC,, | Performed by: FAMILY MEDICINE

## 2023-12-07 PROCEDURE — 3288F PR FALLS RISK ASSESSMENT DOCUMENTED: ICD-10-PCS | Mod: HCNC,CPTII,S$GLB, | Performed by: FAMILY MEDICINE

## 2023-12-07 PROCEDURE — 3074F PR MOST RECENT SYSTOLIC BLOOD PRESSURE < 130 MM HG: ICD-10-PCS | Mod: HCNC,CPTII,S$GLB, | Performed by: FAMILY MEDICINE

## 2023-12-07 PROCEDURE — G0008 ADMIN INFLUENZA VIRUS VAC: HCPCS | Mod: HCNC,S$GLB,, | Performed by: FAMILY MEDICINE

## 2023-12-07 PROCEDURE — 1100F PTFALLS ASSESS-DOCD GE2>/YR: CPT | Mod: HCNC,CPTII,S$GLB, | Performed by: FAMILY MEDICINE

## 2023-12-07 PROCEDURE — 90694 FLU VACCINE - QUADRIVALENT - ADJUVANTED: ICD-10-PCS | Mod: HCNC,S$GLB,, | Performed by: FAMILY MEDICINE

## 2023-12-07 PROCEDURE — 99215 PR OFFICE/OUTPT VISIT, EST, LEVL V, 40-54 MIN: ICD-10-PCS | Mod: HCNC,S$GLB,, | Performed by: FAMILY MEDICINE

## 2023-12-07 PROCEDURE — 90694 VACC AIIV4 NO PRSRV 0.5ML IM: CPT | Mod: HCNC,S$GLB,, | Performed by: FAMILY MEDICINE

## 2023-12-07 PROCEDURE — 99215 OFFICE O/P EST HI 40 MIN: CPT | Mod: HCNC,S$GLB,, | Performed by: FAMILY MEDICINE

## 2023-12-07 PROCEDURE — 1160F RVW MEDS BY RX/DR IN RCRD: CPT | Mod: HCNC,CPTII,S$GLB, | Performed by: FAMILY MEDICINE

## 2023-12-07 PROCEDURE — 3288F FALL RISK ASSESSMENT DOCD: CPT | Mod: HCNC,CPTII,S$GLB, | Performed by: FAMILY MEDICINE

## 2023-12-07 PROCEDURE — 3061F NEG MICROALBUMINURIA REV: CPT | Mod: HCNC,CPTII,S$GLB, | Performed by: FAMILY MEDICINE

## 2023-12-07 PROCEDURE — 3008F BODY MASS INDEX DOCD: CPT | Mod: HCNC,CPTII,S$GLB, | Performed by: FAMILY MEDICINE

## 2023-12-07 PROCEDURE — 3061F PR NEG MICROALBUMINURIA RESULT DOCUMENTED/REVIEW: ICD-10-PCS | Mod: HCNC,CPTII,S$GLB, | Performed by: FAMILY MEDICINE

## 2023-12-07 PROCEDURE — 3074F SYST BP LT 130 MM HG: CPT | Mod: HCNC,CPTII,S$GLB, | Performed by: FAMILY MEDICINE

## 2023-12-07 PROCEDURE — 3078F DIAST BP <80 MM HG: CPT | Mod: HCNC,CPTII,S$GLB, | Performed by: FAMILY MEDICINE

## 2023-12-07 PROCEDURE — 1157F ADVNC CARE PLAN IN RCRD: CPT | Mod: HCNC,CPTII,S$GLB, | Performed by: FAMILY MEDICINE

## 2023-12-07 PROCEDURE — 3066F PR DOCUMENTATION OF TREATMENT FOR NEPHROPATHY: ICD-10-PCS | Mod: HCNC,CPTII,S$GLB, | Performed by: FAMILY MEDICINE

## 2023-12-07 PROCEDURE — 4010F PR ACE/ARB THEARPY RXD/TAKEN: ICD-10-PCS | Mod: HCNC,CPTII,S$GLB, | Performed by: FAMILY MEDICINE

## 2023-12-07 PROCEDURE — 1160F PR REVIEW ALL MEDS BY PRESCRIBER/CLIN PHARMACIST DOCUMENTED: ICD-10-PCS | Mod: HCNC,CPTII,S$GLB, | Performed by: FAMILY MEDICINE

## 2023-12-07 PROCEDURE — G0008 FLU VACCINE - QUADRIVALENT - ADJUVANTED: ICD-10-PCS | Mod: HCNC,S$GLB,, | Performed by: FAMILY MEDICINE

## 2023-12-07 PROCEDURE — 3078F PR MOST RECENT DIASTOLIC BLOOD PRESSURE < 80 MM HG: ICD-10-PCS | Mod: HCNC,CPTII,S$GLB, | Performed by: FAMILY MEDICINE

## 2023-12-07 PROCEDURE — 1159F PR MEDICATION LIST DOCUMENTED IN MEDICAL RECORD: ICD-10-PCS | Mod: HCNC,CPTII,S$GLB, | Performed by: FAMILY MEDICINE

## 2023-12-07 PROCEDURE — 1100F PR PT FALLS ASSESS DOC 2+ FALLS/FALL W/INJURY/YR: ICD-10-PCS | Mod: HCNC,CPTII,S$GLB, | Performed by: FAMILY MEDICINE

## 2023-12-07 PROCEDURE — 4010F ACE/ARB THERAPY RXD/TAKEN: CPT | Mod: HCNC,CPTII,S$GLB, | Performed by: FAMILY MEDICINE

## 2023-12-07 PROCEDURE — 3044F HG A1C LEVEL LT 7.0%: CPT | Mod: HCNC,CPTII,S$GLB, | Performed by: FAMILY MEDICINE

## 2023-12-07 PROCEDURE — 1159F MED LIST DOCD IN RCRD: CPT | Mod: HCNC,CPTII,S$GLB, | Performed by: FAMILY MEDICINE

## 2023-12-07 PROCEDURE — 3066F NEPHROPATHY DOC TX: CPT | Mod: HCNC,CPTII,S$GLB, | Performed by: FAMILY MEDICINE

## 2023-12-07 PROCEDURE — 3044F PR MOST RECENT HEMOGLOBIN A1C LEVEL <7.0%: ICD-10-PCS | Mod: HCNC,CPTII,S$GLB, | Performed by: FAMILY MEDICINE

## 2023-12-07 PROCEDURE — 3008F PR BODY MASS INDEX (BMI) DOCUMENTED: ICD-10-PCS | Mod: HCNC,CPTII,S$GLB, | Performed by: FAMILY MEDICINE

## 2023-12-07 PROCEDURE — 1157F PR ADVANCE CARE PLAN OR EQUIV PRESENT IN MEDICAL RECORD: ICD-10-PCS | Mod: HCNC,CPTII,S$GLB, | Performed by: FAMILY MEDICINE

## 2023-12-07 PROCEDURE — 1125F AMNT PAIN NOTED PAIN PRSNT: CPT | Mod: HCNC,CPTII,S$GLB, | Performed by: FAMILY MEDICINE

## 2023-12-07 PROCEDURE — 99999 PR PBB SHADOW E&M-EST. PATIENT-LVL IV: ICD-10-PCS | Mod: PBBFAC,HCNC,, | Performed by: FAMILY MEDICINE

## 2023-12-07 PROCEDURE — 1125F PR PAIN SEVERITY QUANTIFIED, PAIN PRESENT: ICD-10-PCS | Mod: HCNC,CPTII,S$GLB, | Performed by: FAMILY MEDICINE

## 2023-12-07 RX ORDER — ESCITALOPRAM OXALATE 20 MG/1
20 TABLET ORAL DAILY
Qty: 90 TABLET | Refills: 4 | Status: SHIPPED | OUTPATIENT
Start: 2023-12-07 | End: 2024-12-06

## 2023-12-07 NOTE — PROGRESS NOTES
Subjective     Patient ID: Kanu Carrero is a 73 y.o. male.    Chief Complaint: No chief complaint on file.    73 years old male came to the clinic for blood pressure check.  Blood pressure today was stable.  No Chest pain, palpitation, orthopnea or PND.  Patient is requesting possible adjustment of the depression medicine.  No suicidal or homicidal ideations.  Last A1c was normal.      Review of Systems   Constitutional: Negative.    HENT: Negative.     Eyes: Negative.    Respiratory: Negative.     Cardiovascular: Negative.  Negative for chest pain, palpitations, leg swelling and claudication.   Gastrointestinal: Negative.    Endocrine: Negative for polydipsia, polyphagia and polyuria.   Genitourinary: Negative.    Musculoskeletal: Negative.    Integumentary:  Negative.   Neurological: Negative.    Psychiatric/Behavioral:  Positive for dysphoric mood. Negative for suicidal ideas.           Objective     Physical Exam  Vitals and nursing note reviewed.   Constitutional:       General: He is not in acute distress.     Appearance: He is well-developed. He is not diaphoretic.   HENT:      Head: Normocephalic and atraumatic.      Right Ear: External ear normal.      Left Ear: External ear normal.      Nose: Nose normal.      Mouth/Throat:      Pharynx: No oropharyngeal exudate.   Eyes:      General: No scleral icterus.        Right eye: No discharge.         Left eye: No discharge.      Conjunctiva/sclera: Conjunctivae normal.      Pupils: Pupils are equal, round, and reactive to light.   Neck:      Thyroid: No thyromegaly.      Vascular: No JVD.      Trachea: No tracheal deviation.   Cardiovascular:      Rate and Rhythm: Normal rate and regular rhythm.      Heart sounds: Normal heart sounds. No murmur heard.     No friction rub. No gallop.   Pulmonary:      Effort: Pulmonary effort is normal. No respiratory distress.      Breath sounds: Normal breath sounds. No stridor. No wheezing or rales.   Chest:      Chest wall:  No tenderness.   Abdominal:      General: Bowel sounds are normal. There is no distension.      Palpations: Abdomen is soft. There is no mass.      Tenderness: There is no abdominal tenderness. There is no guarding or rebound.   Musculoskeletal:         General: No tenderness. Normal range of motion.      Cervical back: Normal range of motion and neck supple.   Lymphadenopathy:      Cervical: No cervical adenopathy.   Skin:     General: Skin is warm and dry.      Coloration: Skin is not pale.      Findings: No erythema or rash.   Neurological:      Mental Status: He is alert and oriented to person, place, and time.      Cranial Nerves: No cranial nerve deficit.      Motor: No abnormal muscle tone.      Coordination: Coordination normal.      Deep Tendon Reflexes: Reflexes are normal and symmetric. Reflexes normal.   Psychiatric:         Mood and Affect: Mood is depressed.         Behavior: Behavior normal.         Thought Content: Thought content normal.         Judgment: Judgment normal.            Assessment and Plan     1. Needs flu shot  -     Influenza (FLUAD) - Quadrivalent (Adjuvanted) *Preferred* (65+) (PF)    2. Primary hypertension  Overview:  2005: Diagnosed.    Orders:  -     Urinalysis; Future  -     Comprehensive Metabolic Panel; Future; Expected date: 12/07/2023  -     Lipid Panel; Future; Expected date: 12/07/2023  -     TSH; Future; Expected date: 12/07/2023  -     CBC Auto Differential; Future; Expected date: 12/07/2023    3. Moderate episode of recurrent major depressive disorder  -     EScitalopram oxalate (LEXAPRO) 20 MG tablet; Take 1 tablet (20 mg total) by mouth once daily.  Dispense: 90 tablet; Refill: 4    4. Prediabetes  -     Hemoglobin A1C; Future; Expected date: 12/07/2023        Continue monitoring blood pressure at home, low sodium diet.          Follow up in about 6 months (around 6/7/2024), or if symptoms worsen or fail to improve.

## 2023-12-15 ENCOUNTER — OFFICE VISIT (OUTPATIENT)
Dept: CARDIOLOGY | Facility: CLINIC | Age: 73
End: 2023-12-15
Attending: INTERNAL MEDICINE
Payer: MEDICARE

## 2023-12-15 VITALS
OXYGEN SATURATION: 96 % | HEIGHT: 69 IN | DIASTOLIC BLOOD PRESSURE: 62 MMHG | WEIGHT: 206.56 LBS | BODY MASS INDEX: 30.59 KG/M2 | HEART RATE: 73 BPM | SYSTOLIC BLOOD PRESSURE: 116 MMHG

## 2023-12-15 DIAGNOSIS — Z79.01 CHRONIC ANTICOAGULATION: ICD-10-CM

## 2023-12-15 DIAGNOSIS — I10 PRIMARY HYPERTENSION: ICD-10-CM

## 2023-12-15 DIAGNOSIS — I50.22 HEART FAILURE, SYSTOLIC, CHRONIC: ICD-10-CM

## 2023-12-15 DIAGNOSIS — Z95.5 HISTORY OF CORONARY ARTERY STENT PLACEMENT: ICD-10-CM

## 2023-12-15 DIAGNOSIS — I35.9 AORTIC VALVE DISEASE: ICD-10-CM

## 2023-12-15 DIAGNOSIS — I44.7 LEFT BUNDLE BRANCH BLOCK: ICD-10-CM

## 2023-12-15 DIAGNOSIS — K21.9 GASTROESOPHAGEAL REFLUX DISEASE WITHOUT ESOPHAGITIS: ICD-10-CM

## 2023-12-15 DIAGNOSIS — I25.10 CORONARY ARTERY DISEASE INVOLVING NATIVE CORONARY ARTERY OF NATIVE HEART WITHOUT ANGINA PECTORIS: ICD-10-CM

## 2023-12-15 DIAGNOSIS — Z95.2 HISTORY OF HEART VALVE REPLACEMENT: ICD-10-CM

## 2023-12-15 DIAGNOSIS — E78.00 HYPERCHOLESTEROLEMIA: ICD-10-CM

## 2023-12-15 DIAGNOSIS — E11.59 TYPE 2 DIABETES MELLITUS WITH OTHER CIRCULATORY COMPLICATION, WITHOUT LONG-TERM CURRENT USE OF INSULIN: ICD-10-CM

## 2023-12-15 DIAGNOSIS — E66.3 OVERWEIGHT: ICD-10-CM

## 2023-12-15 PROCEDURE — 3044F PR MOST RECENT HEMOGLOBIN A1C LEVEL <7.0%: ICD-10-PCS | Mod: HCNC,CPTII,S$GLB, | Performed by: INTERNAL MEDICINE

## 2023-12-15 PROCEDURE — 3078F PR MOST RECENT DIASTOLIC BLOOD PRESSURE < 80 MM HG: ICD-10-PCS | Mod: HCNC,CPTII,S$GLB, | Performed by: INTERNAL MEDICINE

## 2023-12-15 PROCEDURE — 93010 ELECTROCARDIOGRAM REPORT: CPT | Mod: HCNC,S$GLB,, | Performed by: INTERNAL MEDICINE

## 2023-12-15 PROCEDURE — 3044F HG A1C LEVEL LT 7.0%: CPT | Mod: HCNC,CPTII,S$GLB, | Performed by: INTERNAL MEDICINE

## 2023-12-15 PROCEDURE — 1126F AMNT PAIN NOTED NONE PRSNT: CPT | Mod: HCNC,CPTII,S$GLB, | Performed by: INTERNAL MEDICINE

## 2023-12-15 PROCEDURE — 3061F PR NEG MICROALBUMINURIA RESULT DOCUMENTED/REVIEW: ICD-10-PCS | Mod: HCNC,CPTII,S$GLB, | Performed by: INTERNAL MEDICINE

## 2023-12-15 PROCEDURE — 3288F FALL RISK ASSESSMENT DOCD: CPT | Mod: HCNC,CPTII,S$GLB, | Performed by: INTERNAL MEDICINE

## 2023-12-15 PROCEDURE — 3008F PR BODY MASS INDEX (BMI) DOCUMENTED: ICD-10-PCS | Mod: HCNC,CPTII,S$GLB, | Performed by: INTERNAL MEDICINE

## 2023-12-15 PROCEDURE — 93000 ELECTROCARDIOGRAM COMPLETE: CPT | Mod: HCNC,S$GLB,, | Performed by: INTERNAL MEDICINE

## 2023-12-15 PROCEDURE — 4010F PR ACE/ARB THEARPY RXD/TAKEN: ICD-10-PCS | Mod: HCNC,CPTII,S$GLB, | Performed by: INTERNAL MEDICINE

## 2023-12-15 PROCEDURE — 1100F PR PT FALLS ASSESS DOC 2+ FALLS/FALL W/INJURY/YR: ICD-10-PCS | Mod: HCNC,CPTII,S$GLB, | Performed by: INTERNAL MEDICINE

## 2023-12-15 PROCEDURE — 3066F NEPHROPATHY DOC TX: CPT | Mod: HCNC,CPTII,S$GLB, | Performed by: INTERNAL MEDICINE

## 2023-12-15 PROCEDURE — 1159F MED LIST DOCD IN RCRD: CPT | Mod: HCNC,CPTII,S$GLB, | Performed by: INTERNAL MEDICINE

## 2023-12-15 PROCEDURE — 1159F PR MEDICATION LIST DOCUMENTED IN MEDICAL RECORD: ICD-10-PCS | Mod: HCNC,CPTII,S$GLB, | Performed by: INTERNAL MEDICINE

## 2023-12-15 PROCEDURE — 1160F RVW MEDS BY RX/DR IN RCRD: CPT | Mod: HCNC,CPTII,S$GLB, | Performed by: INTERNAL MEDICINE

## 2023-12-15 PROCEDURE — 99214 OFFICE O/P EST MOD 30 MIN: CPT | Mod: HCNC,25,S$GLB, | Performed by: INTERNAL MEDICINE

## 2023-12-15 PROCEDURE — 3061F NEG MICROALBUMINURIA REV: CPT | Mod: HCNC,CPTII,S$GLB, | Performed by: INTERNAL MEDICINE

## 2023-12-15 PROCEDURE — 1157F ADVNC CARE PLAN IN RCRD: CPT | Mod: HCNC,CPTII,S$GLB, | Performed by: INTERNAL MEDICINE

## 2023-12-15 PROCEDURE — 93000 PR ELECTROCARDIOGRAM, COMPLETE: ICD-10-PCS | Mod: HCNC,S$GLB,, | Performed by: INTERNAL MEDICINE

## 2023-12-15 PROCEDURE — 99999 PR PBB SHADOW E&M-EST. PATIENT-LVL III: CPT | Mod: PBBFAC,HCNC,, | Performed by: INTERNAL MEDICINE

## 2023-12-15 PROCEDURE — 3066F PR DOCUMENTATION OF TREATMENT FOR NEPHROPATHY: ICD-10-PCS | Mod: HCNC,CPTII,S$GLB, | Performed by: INTERNAL MEDICINE

## 2023-12-15 PROCEDURE — 99214 PR OFFICE/OUTPT VISIT, EST, LEVL IV, 30-39 MIN: ICD-10-PCS | Mod: HCNC,25,S$GLB, | Performed by: INTERNAL MEDICINE

## 2023-12-15 PROCEDURE — 1160F PR REVIEW ALL MEDS BY PRESCRIBER/CLIN PHARMACIST DOCUMENTED: ICD-10-PCS | Mod: HCNC,CPTII,S$GLB, | Performed by: INTERNAL MEDICINE

## 2023-12-15 PROCEDURE — 99999 PR PBB SHADOW E&M-EST. PATIENT-LVL III: ICD-10-PCS | Mod: PBBFAC,HCNC,, | Performed by: INTERNAL MEDICINE

## 2023-12-15 PROCEDURE — 3078F DIAST BP <80 MM HG: CPT | Mod: HCNC,CPTII,S$GLB, | Performed by: INTERNAL MEDICINE

## 2023-12-15 PROCEDURE — 1100F PTFALLS ASSESS-DOCD GE2>/YR: CPT | Mod: HCNC,CPTII,S$GLB, | Performed by: INTERNAL MEDICINE

## 2023-12-15 PROCEDURE — 93005 ELECTROCARDIOGRAM TRACING: CPT | Mod: HCNC

## 2023-12-15 PROCEDURE — 3074F PR MOST RECENT SYSTOLIC BLOOD PRESSURE < 130 MM HG: ICD-10-PCS | Mod: HCNC,CPTII,S$GLB, | Performed by: INTERNAL MEDICINE

## 2023-12-15 PROCEDURE — 3074F SYST BP LT 130 MM HG: CPT | Mod: HCNC,CPTII,S$GLB, | Performed by: INTERNAL MEDICINE

## 2023-12-15 PROCEDURE — 1157F PR ADVANCE CARE PLAN OR EQUIV PRESENT IN MEDICAL RECORD: ICD-10-PCS | Mod: HCNC,CPTII,S$GLB, | Performed by: INTERNAL MEDICINE

## 2023-12-15 PROCEDURE — 1126F PR PAIN SEVERITY QUANTIFIED, NO PAIN PRESENT: ICD-10-PCS | Mod: HCNC,CPTII,S$GLB, | Performed by: INTERNAL MEDICINE

## 2023-12-15 PROCEDURE — 3288F PR FALLS RISK ASSESSMENT DOCUMENTED: ICD-10-PCS | Mod: HCNC,CPTII,S$GLB, | Performed by: INTERNAL MEDICINE

## 2023-12-15 PROCEDURE — 3008F BODY MASS INDEX DOCD: CPT | Mod: HCNC,CPTII,S$GLB, | Performed by: INTERNAL MEDICINE

## 2023-12-15 PROCEDURE — 93010 EKG 12-LEAD: ICD-10-PCS | Mod: HCNC,S$GLB,, | Performed by: INTERNAL MEDICINE

## 2023-12-15 PROCEDURE — 4010F ACE/ARB THERAPY RXD/TAKEN: CPT | Mod: HCNC,CPTII,S$GLB, | Performed by: INTERNAL MEDICINE

## 2023-12-15 RX ORDER — WARFARIN 3 MG/1
TABLET ORAL
Qty: 140 TABLET | Refills: 3 | Status: SHIPPED | OUTPATIENT
Start: 2023-12-15

## 2023-12-15 RX ORDER — AMLODIPINE BESYLATE 10 MG/1
10 TABLET ORAL DAILY
Qty: 90 TABLET | Refills: 3 | Status: SHIPPED | OUTPATIENT
Start: 2023-12-15

## 2023-12-15 RX ORDER — ROSUVASTATIN CALCIUM 20 MG/1
20 TABLET, COATED ORAL DAILY
Qty: 90 TABLET | Refills: 3 | Status: SHIPPED | OUTPATIENT
Start: 2023-12-15

## 2023-12-15 RX ORDER — RAMIPRIL 10 MG/1
10 CAPSULE ORAL DAILY
Qty: 90 CAPSULE | Refills: 3 | Status: SHIPPED | OUTPATIENT
Start: 2023-12-15

## 2023-12-15 RX ORDER — EZETIMIBE 10 MG/1
10 TABLET ORAL DAILY
Qty: 90 TABLET | Refills: 3 | Status: SHIPPED | OUTPATIENT
Start: 2023-12-15

## 2023-12-15 RX ORDER — CHLORTHALIDONE 25 MG/1
25 TABLET ORAL DAILY
Qty: 90 TABLET | Refills: 3 | Status: SHIPPED | OUTPATIENT
Start: 2023-12-15

## 2023-12-15 NOTE — PROGRESS NOTES
Subjective:     Kanu Carrero is a 73 y.o.male with hypertension, hypercholesterolemia and diabetes mellitus type 2. He is mildly obese. He has coronary artery disease with mild left main disease and a history of an intervention of the right coronary artery in 2008. He has left bundle branch block. He had moderate to severe aortic stenosis. In the fall of 2013 developed mild exertional chest pressure. He underwent cardiac catheterization on 1/30/2014 which revealed an aortic valve area of 1.1 cm2. No obstructive coronary artery disease was seen with an about 40% left main stenosis. There was mild left ventricular dysfunction. He underwent aortic valve replacement receiving a [employer] mechanical valve on 3/11/2014. He was begun on warfarin with anticoagulation. He was diagnosed with iron deficiency anemia in 10/2016. He underwent an esophagogastroduodenoscopy and colonoscopy that he said were negative. It was decided to discontine aspirin that he was then taking in addition to being anticoagulated. On 11/4/2022 he began empagliflozin. On 12/19/2022 he underwent arthroscopic surgery of the left knee. No exertional chest discomfort or exertional dyspnea. No palpitations or weak spells. No bleeding. No issues with any of his prescribed medications. Feeling well overall.      Coronary Artery Disease  Presents for follow-up visit. The disease course has been stable. The condition has lasted for  years. Pertinent negatives include no chest pain, chest pressure, chest tightness, dizziness, leg swelling, muscle weakness, palpitations, shortness of breath or weight gain. Risk factors include diabetes, hyperlipidemia, hypertension and obesity. Risk factors do not include decreased physical activity. His past medical history is significant for CHF. There is no history of arrhythmia. The symptoms have been stable.   Congestive Heart Failure  Presents for follow-up visit. The disease course has been stable. The condition has  lasted for  years. Pertinent negatives include no abdominal pain, chest pain, chest pressure, claudication, edema, fatigue, muscle weakness, near-syncope, nocturia, orthopnea, palpitations, paroxysmal nocturnal dyspnea, shortness of breath or unexpected weight change. The symptoms have been stable. His past medical history is significant for CAD, DM and HTN. There is no history of arrhythmia or chronic lung disease.   Hypertension  This is a chronic problem. The current episode started more than 1 year ago. The problem is unchanged. The problem is controlled (usually 115-125/55-60 mmHg at home). Pertinent negatives include no anxiety, blurred vision, chest pain, headaches, malaise/fatigue, neck pain, orthopnea, palpitations, peripheral edema, PND, shortness of breath or sweats. There is no history of chronic renal disease.   Hyperlipidemia  This is a chronic problem. The current episode started more than 1 year ago. The problem is controlled. Recent lipid tests were reviewed and are variable. Exacerbating diseases include diabetes and obesity. He has no history of chronic renal disease, hypothyroidism, liver disease or nephrotic syndrome. Pertinent negatives include no chest pain, focal sensory loss, focal weakness, leg pain, myalgias or shortness of breath.       Review of Systems   Constitutional: Negative for chills, fatigue, fever, malaise/fatigue, unexpected weight change and weight gain.   HENT:  Negative for hoarse voice and nosebleeds.    Eyes:  Negative for blurred vision, pain, vision loss in left eye and vision loss in right eye.   Cardiovascular:  Negative for chest pain, claudication, dyspnea on exertion, irregular heartbeat, leg swelling, near-syncope, orthopnea, palpitations, paroxysmal nocturnal dyspnea and syncope.   Respiratory:  Negative for chest tightness, cough, hemoptysis, shortness of breath and wheezing.    Endocrine: Negative for cold intolerance and heat intolerance.    Hematologic/Lymphatic: Negative for bleeding problem. Does not bruise/bleed easily.   Skin:  Negative for color change and rash.   Musculoskeletal:  Negative for back pain, falls, gout, muscle weakness, myalgias and neck pain.   Gastrointestinal:  Negative for abdominal pain, heartburn, hematemesis, hematochezia, hemorrhoids, jaundice, melena, nausea and vomiting.   Genitourinary:  Negative for dysuria, hematuria and nocturia.   Neurological:  Negative for dizziness, focal weakness, headaches, light-headedness, loss of balance, numbness, tremors and vertigo.   Psychiatric/Behavioral:  Negative for altered mental status, depression and memory loss. The patient is not nervous/anxious.    Allergic/Immunologic: Negative for hives and persistent infections.       Current Outpatient Medications on File Prior to Visit   Medication Sig Dispense Refill    acetaminophen (TYLENOL) 500 MG tablet Take 500 mg by mouth every 6 (six) hours as needed for Pain.      allopurinoL (ZYLOPRIM) 300 MG tablet Take 300 mg by mouth.      amLODIPine (NORVASC) 10 MG tablet Take 1 tablet (10 mg total) by mouth once daily. 90 tablet 3    chlorthalidone (HYGROTEN) 25 MG Tab Take 1 tablet (25 mg total) by mouth once daily. 90 tablet 3    cyanocobalamin (VITAMIN B-12) 100 MCG tablet Take 100 mcg by mouth once daily.      empagliflozin (JARDIANCE) 10 mg tablet Take 1 tablet (10 mg total) by mouth once daily. 90 tablet 3    EScitalopram oxalate (LEXAPRO) 20 MG tablet Take 1 tablet (20 mg total) by mouth once daily. 90 tablet 4    ezetimibe (ZETIA) 10 mg tablet Take 1 tablet (10 mg total) by mouth once daily. 90 tablet 3    ferrous sulfate (FEOSOL) 325 mg (65 mg iron) Tab tablet Take 1 tablet (325 mg total) by mouth daily with breakfast. 90 tablet 3    MULTIVITAMIN W-MINERALS/LUTEIN (CENTRUM SILVER ORAL) Take 1 tablet by mouth once daily.      omeprazole (PRILOSEC) 20 MG capsule Take 20 mg by mouth once daily.   11    ramipriL (ALTACE) 10 MG capsule  "Take 1 capsule (10 mg total) by mouth once daily. 90 capsule 3    rosuvastatin (CRESTOR) 20 MG tablet Take 1 tablet (20 mg total) by mouth once daily. 90 tablet 3    tamsulosin (FLOMAX) 0.4 mg Cap Take 1 capsule (0.4 mg total) by mouth once daily. 90 capsule 3    vitamin D (VITAMIN D3) 1000 units Tab Take 1,000 Units by mouth once daily.      warfarin (JANTOVEN) 3 MG tablet DOSE TO BE ADJUSTED ACCORDING TO INTERNATIONAL NORMALIZED RATIO. 140 tablet 3     No current facility-administered medications on file prior to visit.       /62   Pulse 73   Ht 5' 9" (1.753 m)   Wt 93.7 kg (206 lb 9.1 oz)   SpO2 96%   BMI 30.51 kg/m²     Objective:     Physical Exam  Constitutional:       General: He is not in acute distress.     Appearance: Normal appearance. He is well-developed. He is not ill-appearing or diaphoretic.   HENT:      Head: Normocephalic and atraumatic.      Nose: Nose normal.   Eyes:      General:         Right eye: No discharge.         Left eye: No discharge.      Conjunctiva/sclera:      Right eye: Right conjunctiva is not injected.      Left eye: Left conjunctiva is not injected.      Pupils: Pupils are equal.      Right eye: Pupil is round.      Left eye: Pupil is round.   Neck:      Thyroid: No thyroid mass or thyromegaly.      Vascular: No carotid bruit or JVD.   Cardiovascular:      Rate and Rhythm: Normal rate and regular rhythm. No extrasystoles are present.     Chest Wall: PMI is not displaced.      Pulses:           Radial pulses are 2+ on the right side and 2+ on the left side.        Femoral pulses are 2+ on the right side and 2+ on the left side.       Dorsalis pedis pulses are 2+ on the right side and 2+ on the left side.        Posterior tibial pulses are 2+ on the right side and 2+ on the left side.      Heart sounds: S1 normal. Murmur heard.      Harsh midsystolic murmur is present with a grade of 3/6 at the upper right sternal border. Mechanical S2.      No gallop.      Comments: " Mechanical S2.  Pulmonary:      Effort: Pulmonary effort is normal.      Breath sounds: Normal breath sounds.   Abdominal:      Palpations: Abdomen is soft.      Tenderness: There is no abdominal tenderness.   Musculoskeletal:      Cervical back: Neck supple.      Right ankle: Swelling present. No deformity or ecchymosis.      Left ankle: Swelling present. No deformity or ecchymosis.   Lymphadenopathy:      Head:      Right side of head: No submandibular adenopathy.      Left side of head: No submandibular adenopathy.      Cervical: No cervical adenopathy.   Skin:     General: Skin is warm and dry.   Neurological:      General: No focal deficit present.      Mental Status: He is alert and oriented to person, place, and time. He is not disoriented.      Cranial Nerves: No cranial nerve deficit.   Psychiatric:         Attention and Perception: Attention and perception normal.         Mood and Affect: Mood and affect normal.         Speech: Speech normal.         Behavior: Behavior normal.         Thought Content: Thought content normal.         Cognition and Memory: Cognition and memory normal.         Judgment: Judgment normal.          Assessment:     1. Aortic valve disease    2. History of heart valve replacement    3. Chronic anticoagulation    4. Coronary artery disease involving native coronary artery of native heart without angina pectoris    5. History of coronary artery stent placement    6. Heart failure, systolic, chronic    7. Left bundle branch block    8. Primary hypertension    9. Hypercholesterolemia    10. Type 2 diabetes mellitus with other circulatory complication, without long-term current use of insulin    11. Overweight    12. Gastroesophageal reflux disease without esophagitis        Plan:     1. Aortic Valve Disease              5/22/2013: Echo: Mild AS 3.0 m/s.   Fall 2013: Mild to moderate exertional chest pressure.   1/21/2014: Echo: Normal LV size with low normal LV function. EF 50-55%.  LBBB. Moderate AS - 3.2 m/s - 1.2 cm2. Mild to moderate MR.              1/21/2014: Carotid Duplex: Mild plaquing.              1/30/2014: [facility]: Cath: Severe aortic stenosis - 1.1 cm2. Mean gradient 47 mm Hg. Mild CAD. Mild LV dysfunction with EF 50%.              3/11/2014: [facility]: AVR: [employer] Mechanical Valve - 23 mm.   5/28/2014: Echo: Mechanical aortic valve - 2.4 m/s - mild AR.   5/25/2021: Echo: Mechanical AVR - fine - MG 16 mmHg - DI 0.53.   On warfarin.   Knows about endocarditis prophylaxis.   Stable.    2. Chronic Anticoagulation   3/2014: Began warfarin for mechanical aortic valve. Goal INR 2-3. Anticoagulation managed by Dr. Delgadillo.    2018: Aspirin 81 mg was discontinued due to iron deficiency anemia.   On warfarin.   INR followed.   No aspirin or NSAIDs.    No obvious bleeding.    3. Coronary Artery Disease   2008: [facility]: Cath: RCA: Stent.   6/8/2010: [facility]: Cath: LM 40%. RCA: Stent patent. EF 40-45%.              No aspirin as he has iron deficiency anemia and is anticoagulated.              Stable.    4. Heart Failure, Systolic and Diastolic, Chronic   2005: Diagnosed. EF 40-45%.   5/22/2013: Echo: EF 50-55%.   5/28/2014: Echo: Normal left ventricular size and function. Mild LVH. Mildly dilated LA. Mechanical aortic valve - 2.4 m/s - mild AR.   9/8/2016: Echo: Normal left ventricular size and systolic function. Mild LVH. Moderate diastolic dysfunction. LBBB. Moderately dilated LA. Mechanical AVR fine - 2.7 m/s.   5/25/2021: Echo: The left ventricle is mildly dilated with low normal systolic function. EF 50%. The mid anteroseptal wall and apical septum are moderately hypokinetic. The basal inferolateral wall is severely hypokinetic. The remainder contract well. LBBB. Mild diastolic dysfunction. Mildly dilated LA. Mechanical AVR - fine - MG 16 mmHg - DI 0.53.   6/5/2023: Echo: Normal left ventricular size with low normal systolic function. LBBB. Apical septum mildly hypokinetic. EF 50%. Mild  diastolic dysfunction. [facility] AVR - fine.    3/1/2019: Metoprolol was discontinued due to HR of 46 bpm.    11/4/2022: Empagliflozin 10 mg Q24 was begun.              On empagliflozin 10 mg Q24, ramipril 10 mg Q24 and chlorthalidone 25 mg Q24.   Well compensated.   6/2024: Plan next Echo. Then every few years.     5. Left Bundle Branch Block              2005: Diagnosed.   9/8/2017: SB. LBBB.  ms.   10/19/2018: SB 52. LBBB with  msec.   3/1/2019: HR 46 bpm.   7/5/2019: HR 68 bpm.   5/20/2021: HR 67.  ms. LBBB with  ms.   7/1/2022: ECG: LBBB.  ms.   12/15/2023: ECG: SB 70.  ms.              Stable.                6. Hypertension              2005: Diagnosed.   3/1/2019: Metoprolol was discontinued due to HR of 46 bpm.    On amlodipine 10 mg Q24, ramipril 10 mg Q24 and chlorthalidone 25 mg Q24.   Leg edema resolved with diuretic.   Keeping log at home.   Well controlled.     7. Hypercholesterolemia   2007: Began statin.              On atorvastatin 80 mg Q24.   12/16/2016: Chol 134. HDL 35. LDL 74. .   May have had some muscle aches while on atorvastatin.    On rosuvastatin 10 mg Q24.   9/13/2017: Chol 158. HDL 33. LDL 96. .   On rosuvastatin 20 mg Q24.   9/14/2018: Chol 153. HDL 35. LDL 92. .   2/20/2019: Chol 151. HDL 35. LDL 86. .   7/9/2020: Chol 169. HDL 40. LDL 95. .   On rosuvastatin 20 mg Q24.   9/16/2020: Ezetimibe 10 mg Q24 was begun.   On rosuvastatin 20 mg Q24 and ezetimibe 10 mg Q24    11/18/2020: Chol 109. HDL 34. LDL 50. .   6/3/2021: Chol 112. HDL 33. LDL 49. .   6/3/2022: Chol 113. HDL 41. LDL 48. .   12/1/2022: Chol 129. HDL 38. LDL 65. .   6/2/2023: Chol 117. HDL 33. LDL 52. .   11/29/2023: Chol 123. HDL 37. LDL 67. TG 96.   On rosuvastatin 20 mg Q24 and ezetimibe 10 mg Q24    Very favorable lipid panel.     8. Diabetes Mellitus, Type 2   3/2014: Diagnosed. Complications: CAD. Medications: Oral  agent.   11/4/2022: Empagliflozin 10 mg Q24 was begun for HF. Metformin 500 mg Q12 was discontinued.    6/3/2022: HgbA1C 5.8%.   12/1/2022: HgbA1C 5.9%.   On empagliflozin 10 mg Q24 that was initiated for HF.    Well controlled.     9. Overweight   5/5/2017: Weight 97 kg. BMI 32.   9/8/2017: Weight 95 kg. BMI 31.   6/1/2018: Weight 92 kg. BMI 30.   10/19/2018: Weight 89 kg. BMI 29.   3/1/2019: Weight 94 kg. BMI 31.   11/1/2019: Weight 92 kg. BMI 30.   9/16/2020: Weight 96 kg. BMI 31.   3/13/2023: Weight 89 kg. BMI 29.   12/15/2023: Weight 94 kg. BMI 31.     Encouraged to lose weight.    10. Gastroesophageal Reflux Disease   2008: Diagnosed.   On omeprazole 20 mg Q24.    11. Anemia   10/2016: Diagnosed with iron deficiency anemia.   10/4/2016: EGD & Colonoscopy: Negative.   Receiving iron.    12. History of Knee Surgery   12/19/2022: Arthroscopic surgery of the left knee.    13. Primary Care              Dr. Olvin Hanley.    F/u 4 months.    Sharad Delgadillo M.D.

## 2024-01-03 ENCOUNTER — LAB VISIT (OUTPATIENT)
Dept: LAB | Facility: HOSPITAL | Age: 74
End: 2024-01-03
Attending: INTERNAL MEDICINE
Payer: MEDICARE

## 2024-01-03 DIAGNOSIS — Z79.01 CHRONIC ANTICOAGULATION: ICD-10-CM

## 2024-01-03 DIAGNOSIS — Z95.2 HISTORY OF HEART VALVE REPLACEMENT: ICD-10-CM

## 2024-01-03 LAB
INR PPP: 1.9 (ref 0.8–1.2)
PROTHROMBIN TIME: 19.4 SEC (ref 9–12.5)

## 2024-01-03 PROCEDURE — 85610 PROTHROMBIN TIME: CPT | Mod: HCNC | Performed by: INTERNAL MEDICINE

## 2024-01-03 PROCEDURE — 36415 COLL VENOUS BLD VENIPUNCTURE: CPT | Mod: HCNC,PO | Performed by: INTERNAL MEDICINE

## 2024-01-04 ENCOUNTER — TELEPHONE (OUTPATIENT)
Dept: CARDIOLOGY | Facility: CLINIC | Age: 74
End: 2024-01-04
Payer: MEDICARE

## 2024-01-04 NOTE — TELEPHONE ENCOUNTER
Pt notified and verbalized understanding.      ----- Message from Sharad Delgadillo MD sent at 1/3/2024  5:49 PM CST -----  8/3/2023: INR 2.0.     Continue current dose: Warfarin 4.5 mg 5/7 and 3 mg 2/7.     8/21/2023: INR 2.8.     Continue current dose: Warfarin 4.5 mg 5/7 and 3 mg 2/7.     9/18/2023: INR 2.4.     Continue current dose: Warfarin 4.5 mg 5/7 and 3 mg 2/7.     10/24/2023: INR 2.0.     Continue current dose: Warfarin 4.5 mg 5/7 and 3 mg 2/7.     11/29/2023: INR 2.1.     Continue current dose: Warfarin 4.5 mg 5/7 and 3 mg 2/7.    1/3/2024: INR 1.9.    Increase dose: Warfarin 4.5 mg 7/7.     In 2 weeks: Next INR.     Sharad Delgadillo M.D.

## 2024-01-18 ENCOUNTER — LAB VISIT (OUTPATIENT)
Dept: LAB | Facility: HOSPITAL | Age: 74
End: 2024-01-18
Attending: INTERNAL MEDICINE
Payer: MEDICARE

## 2024-01-18 DIAGNOSIS — Z79.01 CHRONIC ANTICOAGULATION: ICD-10-CM

## 2024-01-18 DIAGNOSIS — Z95.2 HISTORY OF HEART VALVE REPLACEMENT: ICD-10-CM

## 2024-01-18 PROCEDURE — 85610 PROTHROMBIN TIME: CPT | Mod: HCNC | Performed by: INTERNAL MEDICINE

## 2024-01-18 PROCEDURE — 36415 COLL VENOUS BLD VENIPUNCTURE: CPT | Mod: HCNC,PO | Performed by: INTERNAL MEDICINE

## 2024-01-19 ENCOUNTER — TELEPHONE (OUTPATIENT)
Dept: CARDIOLOGY | Facility: CLINIC | Age: 74
End: 2024-01-19
Payer: MEDICARE

## 2024-01-19 LAB
INR PPP: 2.2 (ref 0.8–1.2)
PROTHROMBIN TIME: 22.9 SEC (ref 9–12.5)

## 2024-01-19 NOTE — TELEPHONE ENCOUNTER
Spoke to patient - advised to continue same dose, recheck INR 4 wks      ----- Message from Sharad Delgadillo MD sent at 1/19/2024  7:40 AM CST -----  8/3/2023: INR 2.0.     Continue current dose: Warfarin 4.5 mg 5/7 and 3 mg 2/7.     8/21/2023: INR 2.8.     Continue current dose: Warfarin 4.5 mg 5/7 and 3 mg 2/7.     9/18/2023: INR 2.4.     Continue current dose: Warfarin 4.5 mg 5/7 and 3 mg 2/7.     10/24/2023: INR 2.0.     Continue current dose: Warfarin 4.5 mg 5/7 and 3 mg 2/7.     11/29/2023: INR 2.1.     Continue current dose: Warfarin 4.5 mg 5/7 and 3 mg 2/7.     1/3/2024: INR 1.9.     Increase dose: Warfarin 4.5 mg 7/7.    1/18/2024: INR 2.2.    Continue current dose: Warfarin 4.5 mg 7/7.     In 4 weeks: Next INR.     Sharad Delgadillo M.D.

## 2024-01-28 DIAGNOSIS — R97.20 ELEVATED PSA, LESS THAN 10 NG/ML: ICD-10-CM

## 2024-01-29 RX ORDER — TAMSULOSIN HYDROCHLORIDE 0.4 MG/1
0.4 CAPSULE ORAL
Qty: 90 CAPSULE | Refills: 3 | Status: SHIPPED | OUTPATIENT
Start: 2024-01-29

## 2024-01-29 NOTE — TELEPHONE ENCOUNTER
"Last office visit June 2023. No f/u appt scheduled. Please advise refill request Flomax.    "  PLAN:      Elevated PSA     - PSA normalized today. Will continue to follow with PSA in 6 months per patient preference.     2. BPH with LUTS     - Continue tamsulosin. Symptoms improved since starting medication    RTC 6 months     Kem Donnelly MD  Urology  [facility] [place]"  "

## 2024-02-23 ENCOUNTER — LAB VISIT (OUTPATIENT)
Dept: LAB | Facility: HOSPITAL | Age: 74
End: 2024-02-23
Attending: INTERNAL MEDICINE
Payer: MEDICARE

## 2024-02-23 DIAGNOSIS — Z79.01 CHRONIC ANTICOAGULATION: ICD-10-CM

## 2024-02-23 DIAGNOSIS — Z95.2 HISTORY OF HEART VALVE REPLACEMENT: ICD-10-CM

## 2024-02-23 LAB
INR PPP: 2.3 (ref 0.8–1.2)
PROTHROMBIN TIME: 23 SEC (ref 9–12.5)

## 2024-02-23 PROCEDURE — 85610 PROTHROMBIN TIME: CPT | Mod: HCNC | Performed by: INTERNAL MEDICINE

## 2024-02-23 PROCEDURE — 36415 COLL VENOUS BLD VENIPUNCTURE: CPT | Mod: HCNC,PO | Performed by: INTERNAL MEDICINE

## 2024-02-26 ENCOUNTER — TELEPHONE (OUTPATIENT)
Dept: CARDIOLOGY | Facility: CLINIC | Age: 74
End: 2024-02-26
Payer: MEDICARE

## 2024-02-26 NOTE — TELEPHONE ENCOUNTER
Pt notified and verbalized understanding.    ----- Message from Sharad Delgadillo MD sent at 2/23/2024  8:41 PM CST -----  10/24/2023: INR 2.0.     Continue current dose: Warfarin 4.5 mg 5/7 and 3 mg 2/7.     11/29/2023: INR 2.1.     Continue current dose: Warfarin 4.5 mg 5/7 and 3 mg 2/7.     1/3/2024: INR 1.9.     Increase dose: Warfarin 4.5 mg 7/7.     1/18/2024: INR 2.2.     Continue current dose: Warfarin 4.5 mg 7/7.    2/23/2024: INR 2.3.    Continue current dose: Warfarin 4.5 mg 7/7.     In 4 weeks: Next INR.     Sharad Delgadillo M.D.

## 2024-03-27 DIAGNOSIS — E11.9 TYPE 2 DIABETES MELLITUS WITHOUT COMPLICATION, UNSPECIFIED WHETHER LONG TERM INSULIN USE: ICD-10-CM

## 2024-04-03 ENCOUNTER — LAB VISIT (OUTPATIENT)
Dept: LAB | Facility: HOSPITAL | Age: 74
End: 2024-04-03
Attending: INTERNAL MEDICINE
Payer: MEDICARE

## 2024-04-03 DIAGNOSIS — Z79.01 CHRONIC ANTICOAGULATION: ICD-10-CM

## 2024-04-03 DIAGNOSIS — Z95.2 HISTORY OF HEART VALVE REPLACEMENT: ICD-10-CM

## 2024-04-03 LAB
INR PPP: 2.3 (ref 0.8–1.2)
PROTHROMBIN TIME: 23.9 SEC (ref 9–12.5)

## 2024-04-03 PROCEDURE — 85610 PROTHROMBIN TIME: CPT | Mod: HCNC | Performed by: INTERNAL MEDICINE

## 2024-04-03 PROCEDURE — 36415 COLL VENOUS BLD VENIPUNCTURE: CPT | Mod: HCNC,PO | Performed by: INTERNAL MEDICINE

## 2024-04-04 ENCOUNTER — TELEPHONE (OUTPATIENT)
Dept: CARDIOLOGY | Facility: CLINIC | Age: 74
End: 2024-04-04
Payer: MEDICARE

## 2024-04-04 NOTE — TELEPHONE ENCOUNTER
Pt notified and verbalized understanding,      ----- Message from Sharad Delgadillo MD sent at 4/3/2024  4:14 PM CDT -----  1/3/2024: INR 1.9.     Increase dose: Warfarin 4.5 mg 7/7.     1/18/2024: INR 2.2.     Continue current dose: Warfarin 4.5 mg 7/7.     2/23/2024: INR 2.3.     Continue current dose: Warfarin 4.5 mg 7/7.    4/3/2024: INR 2.3.    Continue current dose: Warfarin 4.5 mg 7/7.     In 4 weeks: Next INR.     Sharad Delgadillo M.D.

## 2024-04-26 ENCOUNTER — OFFICE VISIT (OUTPATIENT)
Dept: CARDIOLOGY | Facility: CLINIC | Age: 74
End: 2024-04-26
Attending: INTERNAL MEDICINE
Payer: COMMERCIAL

## 2024-04-26 VITALS
DIASTOLIC BLOOD PRESSURE: 64 MMHG | OXYGEN SATURATION: 96 % | SYSTOLIC BLOOD PRESSURE: 116 MMHG | WEIGHT: 206.25 LBS | HEIGHT: 69 IN | HEART RATE: 65 BPM | BODY MASS INDEX: 30.55 KG/M2

## 2024-04-26 DIAGNOSIS — I35.9 AORTIC VALVE DISEASE: ICD-10-CM

## 2024-04-26 DIAGNOSIS — E66.9 MILD OBESITY: ICD-10-CM

## 2024-04-26 DIAGNOSIS — I10 PRIMARY HYPERTENSION: ICD-10-CM

## 2024-04-26 DIAGNOSIS — Z95.2 HISTORY OF HEART VALVE REPLACEMENT: ICD-10-CM

## 2024-04-26 DIAGNOSIS — Z79.01 CHRONIC ANTICOAGULATION: ICD-10-CM

## 2024-04-26 DIAGNOSIS — I25.10 CORONARY ARTERY DISEASE INVOLVING NATIVE CORONARY ARTERY OF NATIVE HEART WITHOUT ANGINA PECTORIS: ICD-10-CM

## 2024-04-26 DIAGNOSIS — Z95.5 HISTORY OF CORONARY ARTERY STENT PLACEMENT: ICD-10-CM

## 2024-04-26 DIAGNOSIS — E78.00 HYPERCHOLESTEROLEMIA: ICD-10-CM

## 2024-04-26 DIAGNOSIS — I44.7 LEFT BUNDLE BRANCH BLOCK: ICD-10-CM

## 2024-04-26 DIAGNOSIS — K21.9 GASTROESOPHAGEAL REFLUX DISEASE WITHOUT ESOPHAGITIS: ICD-10-CM

## 2024-04-26 DIAGNOSIS — I50.22 HEART FAILURE, SYSTOLIC, CHRONIC: ICD-10-CM

## 2024-04-26 DIAGNOSIS — E11.59 TYPE 2 DIABETES MELLITUS WITH OTHER CIRCULATORY COMPLICATION, WITHOUT LONG-TERM CURRENT USE OF INSULIN: ICD-10-CM

## 2024-04-26 PROCEDURE — 99214 OFFICE O/P EST MOD 30 MIN: CPT | Mod: S$GLB,,, | Performed by: INTERNAL MEDICINE

## 2024-04-26 PROCEDURE — 99999 PR PBB SHADOW E&M-EST. PATIENT-LVL IV: CPT | Mod: PBBFAC,,, | Performed by: INTERNAL MEDICINE

## 2024-04-26 NOTE — PROGRESS NOTES
Subjective:     Kanu Carrero is a 74 y.o.male with hypertension, hypercholesterolemia and diabetes mellitus type 2. He is mildly obese. He has coronary artery disease with mild left main disease and a history of an intervention of the right coronary artery in 2008. He has left bundle branch block. He had moderate to severe aortic stenosis. In the fall of 2013 developed mild exertional chest pressure. He underwent cardiac catheterization on 1/30/2014 which revealed an aortic valve area of 1.1 cm2. No obstructive coronary artery disease was seen with an about 40% left main stenosis. There was mild left ventricular dysfunction. He underwent aortic valve replacement receiving a [employer] mechanical valve on 3/11/2014. He was begun on warfarin with anticoagulation. He was diagnosed with iron deficiency anemia in 10/2016. He underwent an esophagogastroduodenoscopy and colonoscopy that he said were negative. It was decided to discontine aspirin that he was then taking in addition to being anticoagulated. On 11/4/2022 he began empagliflozin. On 12/19/2022 he underwent arthroscopic surgery of the left knee. No exertional chest discomfort or exertional dyspnea. No palpitations or weak spells. No bleeding. No issues with any of his prescribed medications. Feeling well overall.      Coronary Artery Disease  Presents for follow-up visit. The disease course has been stable. The condition has lasted for  years. Pertinent negatives include no chest pain, chest pressure, chest tightness, dizziness, leg swelling, muscle weakness, palpitations, shortness of breath or weight gain. Risk factors include diabetes, hyperlipidemia, hypertension and obesity. Risk factors do not include decreased physical activity. His past medical history is significant for CHF. There is no history of arrhythmia. The symptoms have been stable.   Congestive Heart Failure  Presents for follow-up visit. The disease course has been stable. The condition has  lasted for  years. Pertinent negatives include no abdominal pain, chest pain, chest pressure, claudication, edema, fatigue, muscle weakness, near-syncope, nocturia, orthopnea, palpitations, paroxysmal nocturnal dyspnea, shortness of breath or unexpected weight change. The symptoms have been stable. His past medical history is significant for CAD, DM and HTN. There is no history of arrhythmia or chronic lung disease.   Hypertension  This is a chronic problem. The current episode started more than 1 year ago. The problem is unchanged. The problem is controlled (usually 115-125/55-60 mmHg at home). Pertinent negatives include no anxiety, blurred vision, chest pain, headaches, malaise/fatigue, neck pain, orthopnea, palpitations, peripheral edema, PND, shortness of breath or sweats. There is no history of chronic renal disease.   Hyperlipidemia  This is a chronic problem. The current episode started more than 1 year ago. The problem is controlled. Recent lipid tests were reviewed and are variable. Exacerbating diseases include diabetes and obesity. He has no history of chronic renal disease, hypothyroidism, liver disease or nephrotic syndrome. Pertinent negatives include no chest pain, focal sensory loss, focal weakness, leg pain, myalgias or shortness of breath.       Review of Systems   Constitutional: Negative for chills, fatigue, fever, malaise/fatigue, unexpected weight change and weight gain.   HENT:  Negative for hoarse voice and nosebleeds.    Eyes:  Negative for blurred vision, pain, vision loss in left eye and vision loss in right eye.   Cardiovascular:  Negative for chest pain, claudication, dyspnea on exertion, irregular heartbeat, leg swelling, near-syncope, orthopnea, palpitations, paroxysmal nocturnal dyspnea and syncope.   Respiratory:  Negative for chest tightness, cough, hemoptysis, shortness of breath and wheezing.    Endocrine: Negative for cold intolerance and heat intolerance.    Hematologic/Lymphatic: Negative for bleeding problem. Does not bruise/bleed easily.   Skin:  Negative for color change and rash.   Musculoskeletal:  Negative for back pain, falls, gout, muscle weakness, myalgias and neck pain.   Gastrointestinal:  Negative for abdominal pain, heartburn, hematemesis, hematochezia, hemorrhoids, jaundice, melena, nausea and vomiting.   Genitourinary:  Negative for dysuria, hematuria and nocturia.   Neurological:  Negative for dizziness, focal weakness, headaches, light-headedness, loss of balance, numbness, tremors and vertigo.   Psychiatric/Behavioral:  Negative for altered mental status, depression and memory loss. The patient is not nervous/anxious.    Allergic/Immunologic: Negative for hives and persistent infections.       Current Outpatient Medications on File Prior to Visit   Medication Sig Dispense Refill    acetaminophen (TYLENOL) 500 MG tablet Take 500 mg by mouth every 6 (six) hours as needed for Pain.      allopurinoL (ZYLOPRIM) 300 MG tablet Take 300 mg by mouth.      amLODIPine (NORVASC) 10 MG tablet Take 1 tablet (10 mg total) by mouth once daily. 90 tablet 3    chlorthalidone (HYGROTEN) 25 MG Tab Take 1 tablet (25 mg total) by mouth once daily. 90 tablet 3    cyanocobalamin (VITAMIN B-12) 100 MCG tablet Take 100 mcg by mouth once daily.      empagliflozin (JARDIANCE) 10 mg tablet Take 1 tablet (10 mg total) by mouth once daily. 90 tablet 3    EScitalopram oxalate (LEXAPRO) 20 MG tablet Take 1 tablet (20 mg total) by mouth once daily. 90 tablet 4    ezetimibe (ZETIA) 10 mg tablet Take 1 tablet (10 mg total) by mouth once daily. 90 tablet 3    ferrous sulfate (FEOSOL) 325 mg (65 mg iron) Tab tablet Take 1 tablet (325 mg total) by mouth daily with breakfast. 90 tablet 3    MULTIVITAMIN W-MINERALS/LUTEIN (CENTRUM SILVER ORAL) Take 1 tablet by mouth once daily.      omeprazole (PRILOSEC) 20 MG capsule Take 20 mg by mouth once daily.   11    ramipriL (ALTACE) 10 MG capsule  "Take 1 capsule (10 mg total) by mouth once daily. 90 capsule 3    rosuvastatin (CRESTOR) 20 MG tablet Take 1 tablet (20 mg total) by mouth once daily. 90 tablet 3    tamsulosin (FLOMAX) 0.4 mg Cap TAKE 1 CAPSULE(0.4 MG) BY MOUTH EVERY DAY 90 capsule 3    vitamin D (VITAMIN D3) 1000 units Tab Take 1,000 Units by mouth once daily.      warfarin (JANTOVEN) 3 MG tablet DOSE TO BE ADJUSTED ACCORDING TO INTERNATIONAL NORMALIZED RATIO. 140 tablet 3     No current facility-administered medications on file prior to visit.       /64   Pulse 65   Ht 5' 9" (1.753 m)   Wt 93.6 kg (206 lb 3.9 oz)   SpO2 96%   BMI 30.46 kg/m²     Objective:     Physical Exam  Constitutional:       General: He is not in acute distress.     Appearance: Normal appearance. He is well-developed. He is not ill-appearing or diaphoretic.   HENT:      Head: Normocephalic and atraumatic.      Nose: Nose normal.   Eyes:      General:         Right eye: No discharge.         Left eye: No discharge.      Conjunctiva/sclera:      Right eye: Right conjunctiva is not injected.      Left eye: Left conjunctiva is not injected.      Pupils: Pupils are equal.      Right eye: Pupil is round.      Left eye: Pupil is round.   Neck:      Thyroid: No thyroid mass or thyromegaly.      Vascular: No carotid bruit or JVD.   Cardiovascular:      Rate and Rhythm: Normal rate and regular rhythm. No extrasystoles are present.     Chest Wall: PMI is not displaced.      Pulses:           Radial pulses are 2+ on the right side and 2+ on the left side.        Femoral pulses are 2+ on the right side and 2+ on the left side.       Dorsalis pedis pulses are 2+ on the right side and 2+ on the left side.        Posterior tibial pulses are 2+ on the right side and 2+ on the left side.      Heart sounds: S1 normal. Murmur heard.      Harsh midsystolic murmur is present with a grade of 3/6 at the upper right sternal border. Mechanical S2.      No gallop.      Comments: Mechanical " S2.  Pulmonary:      Effort: Pulmonary effort is normal.      Breath sounds: Normal breath sounds.   Abdominal:      Palpations: Abdomen is soft.      Tenderness: There is no abdominal tenderness.   Musculoskeletal:      Cervical back: Neck supple.      Right ankle: No swelling, deformity or ecchymosis.      Left ankle: No swelling, deformity or ecchymosis.   Lymphadenopathy:      Head:      Right side of head: No submandibular adenopathy.      Left side of head: No submandibular adenopathy.      Cervical: No cervical adenopathy.   Skin:     General: Skin is warm and dry.   Neurological:      General: No focal deficit present.      Mental Status: He is alert and oriented to person, place, and time. He is not disoriented.      Cranial Nerves: No cranial nerve deficit.   Psychiatric:         Attention and Perception: Attention and perception normal.         Mood and Affect: Mood and affect normal.         Speech: Speech normal.         Behavior: Behavior normal.         Thought Content: Thought content normal.         Cognition and Memory: Cognition and memory normal.         Judgment: Judgment normal.          Assessment:     1. Aortic valve disease    2. History of heart valve replacement    3. Chronic anticoagulation    4. Coronary artery disease involving native coronary artery of native heart without angina pectoris    5. History of coronary artery stent placement    6. Heart failure, systolic, chronic    7. Left bundle branch block    8. Primary hypertension    9. Hypercholesterolemia    10. Type 2 diabetes mellitus with other circulatory complication, without long-term current use of insulin    11. Mild obesity    12. Gastroesophageal reflux disease without esophagitis        Plan:     1. Aortic Valve Disease              5/22/2013: Echo: Mild AS 3.0 m/s.   Fall 2013: Mild to moderate exertional chest pressure.   1/21/2014: Echo: Normal LV size with low normal LV function. EF 50-55%. LBBB. Moderate AS - 3.2 m/s  - 1.2 cm2. Mild to moderate MR.              1/21/2014: Carotid Duplex: Mild plaquing.              1/30/2014: OB: Cath: Severe aortic stenosis - 1.1 cm2. Mean gradient 47 mm Hg. Mild CAD. Mild LV dysfunction with EF 50%.              3/11/2014: OB: AVR: [facility] [facility] Mechanical Valve - 23 mm.   5/28/2014: Echo: Mechanical aortic valve - 2.4 m/s - mild AR.   5/25/2021: Echo: Mechanical AVR - fine - MG 16 mmHg - DI 0.53.   On warfarin.   Knows about endocarditis prophylaxis.   Stable.    2. Chronic Anticoagulation   3/2014: Began warfarin for mechanical aortic valve. Goal INR 2-3. Anticoagulation managed by Dr. Delgadillo.    2018: Aspirin 81 mg was discontinued due to iron deficiency anemia.   On warfarin.   INR followed.   No aspirin or NSAIDs.    No obvious bleeding.    3. Coronary Artery Disease   2008: [facility]: Cath: RCA: Stent.   6[place]8/[place] [facility] [place]: LM 40%. RCA: Stent patent. EF 40-45%.              No aspirin as he has iron deficiency anemia and is anticoagulated.              Stable.    4. Heart Failure, Systolic and Diastolic, Chronic   2005: Diagnosed. EF 40-45%.   5/22/2013: Echo: EF 50-55%.   5/28/2014: Echo: Normal left ventricular size and function. Mild LVH. Mildly dilated LA. Mechanical aortic valve - 2.4 m/s - mild AR.   9/8/2016: Echo: Normal left ventricular size and systolic function. Mild LVH. Moderate diastolic dysfunction. LBBB. Moderately dilated LA. Mechanical AVR fine - 2.7 m/s.   5/25/2021: Echo: The left ventricle is mildly dilated with low normal systolic function. EF 50%. The mid anteroseptal wall and apical septum are moderately hypokinetic. The basal inferolateral wall is severely hypokinetic. The remainder contract well. LBBB. Mild diastolic dysfunction. Mildly dilated LA. Mechanical AVR - fine - MG 16 mmHg - DI 0.53.   6/5/2023: Echo: Normal left ventricular size with low normal systolic function. LBBB. Apical septum mildly hypokinetic. EF 50%. Mild diastolic dysfunction. [facility] AVR -  fine.    3/1/2019: Metoprolol was discontinued due to HR of 46 bpm.    11/4/2022: Empagliflozin 10 mg Q24 was begun.              On empagliflozin 10 mg Q24, ramipril 10 mg Q24 and chlorthalidone 25 mg Q24.   Well compensated.   6/2024: Plan next Echo. Then every few years.     5. Left Bundle Branch Block              2005: Diagnosed.   9/8/2017: SB. LBBB.  ms.   10/19/2018: SB 52. LBBB with  msec.   3/1/2019: HR 46 bpm.   7/5/2019: HR 68 bpm.   5/20/2021: HR 67.  ms. LBBB with  ms.   7/1/2022: ECG: LBBB.  ms.   12/15/2023: ECG: SB 70. LBBB.  ms.              Stable.                6. Hypertension              2005: Diagnosed.   3/1/2019: Metoprolol was discontinued due to HR of 46 bpm.    On amlodipine 10 mg Q24, ramipril 10 mg Q24 and chlorthalidone 25 mg Q24.   Leg edema resolved with diuretic.   Keeping log at home.   Well controlled.     7. Hypercholesterolemia   2007: Began statin.              On atorvastatin 80 mg Q24.   12/16/2016: Chol 134. HDL 35. LDL 74. .   May have had some muscle aches while on atorvastatin.    On rosuvastatin 10 mg Q24.   9/13/2017: Chol 158. HDL 33. LDL 96. .   On rosuvastatin 20 mg Q24.   9/14/2018: Chol 153. HDL 35. LDL 92. .   2/20/2019: Chol 151. HDL 35. LDL 86. .   7/9/2020: Chol 169. HDL 40. LDL 95. .   On rosuvastatin 20 mg Q24.   9/16/2020: Ezetimibe 10 mg Q24 was begun.   On rosuvastatin 20 mg Q24 and ezetimibe 10 mg Q24    11/18/2020: Chol 109. HDL 34. LDL 50. .   6/3/2021: Chol 112. HDL 33. LDL 49. .   6/3/2022: Chol 113. HDL 41. LDL 48. .   12/1/2022: Chol 129. HDL 38. LDL 65. .   6/2/2023: Chol 117. HDL 33. LDL 52. .   11/29/2023: Chol 123. HDL 37. LDL 67. TG 96.   On rosuvastatin 20 mg Q24 and ezetimibe 10 mg Q24    Very favorable lipid panel.     8. Diabetes Mellitus, Type 2   3/2014: Diagnosed. Complications: CAD. Medications: Oral agent.   11/4/2022: Empagliflozin 10  mg Q24 was begun for HF. Metformin 500 mg Q12 was discontinued.    6/3/2022: HgbA1C 5.8%.   12/1/2022: HgbA1C 5.9%.   On empagliflozin 10 mg Q24 that was initiated for HF.    Well controlled.     9. Mild Obesity   5/5/2017: Weight 97 kg. BMI 32.   9/8/2017: Weight 95 kg. BMI 31.   6/1/2018: Weight 92 kg. BMI 30.   10/19/2018: Weight 89 kg. BMI 29.   3/1/2019: Weight 94 kg. BMI 31.   11/1/2019: Weight 92 kg. BMI 30.   9/16/2020: Weight 96 kg. BMI 31.   3/13/2023: Weight 89 kg. BMI 29.   12/15/2023: Weight 94 kg. BMI 31.     Encouraged to lose weight.    10. Gastroesophageal Reflux Disease   2008: Diagnosed.   On omeprazole 20 mg Q24.    11. Anemia   10/2016: Diagnosed with iron deficiency anemia.   10/4/2016: EGD & Colonoscopy: Negative.   Receiving iron.    12. History of Knee Surgery   12/19/2022: Arthroscopic surgery of the left knee.    13. Primary Care              Dr. Olvin Hanley.    F/u 4 months.    Sharad Delgadillo M.D.      6/14/2024 5:50 PM, Addendum:    6/14/2024: Echo: Normal left ventricular size with low normal systolic function. LBBB. Septum moderately hypokinetic. EF 50%. LVGLS -16%. Mild diastolic dysfunction. Mech AVR - 3.5 m/s - MG 23 mmHG - DI 0.37 - 1.2 cm2. Mild AR.     I discussed the above test result and the implications of the findings over the phone.    Sharad Delgadillo M.D.

## 2024-05-08 ENCOUNTER — LAB VISIT (OUTPATIENT)
Dept: LAB | Facility: HOSPITAL | Age: 74
End: 2024-05-08
Attending: INTERNAL MEDICINE
Payer: MEDICARE

## 2024-05-08 DIAGNOSIS — Z95.2 HISTORY OF HEART VALVE REPLACEMENT: ICD-10-CM

## 2024-05-08 DIAGNOSIS — Z79.01 CHRONIC ANTICOAGULATION: ICD-10-CM

## 2024-05-08 LAB
INR PPP: 3.1 (ref 0.8–1.2)
PROTHROMBIN TIME: 31.4 SEC (ref 9–12.5)

## 2024-05-08 PROCEDURE — 36415 COLL VENOUS BLD VENIPUNCTURE: CPT | Mod: HCNC,PO | Performed by: INTERNAL MEDICINE

## 2024-05-08 PROCEDURE — 85610 PROTHROMBIN TIME: CPT | Mod: HCNC | Performed by: INTERNAL MEDICINE

## 2024-05-09 ENCOUNTER — TELEPHONE (OUTPATIENT)
Dept: CARDIOLOGY | Facility: CLINIC | Age: 74
End: 2024-05-09
Payer: MEDICARE

## 2024-05-09 NOTE — TELEPHONE ENCOUNTER
Pt notified and verbalized understanding      ----- Message from Sharad Delgadillo MD sent at 5/9/2024  1:36 PM CDT -----  1/3/2024: INR 1.9.     Increase dose: Warfarin 4.5 mg 7/7.     1/18/2024: INR 2.2.     Continue current dose: Warfarin 4.5 mg 7/7.     2/23/2024: INR 2.3.     Continue current dose: Warfarin 4.5 mg 7/7.     4/3/2024: INR 2.3.     Continue current dose: Warfarin 4.5 mg 7/7.    5/8/2024: INR 3.1.    Continue current dose: Warfarin 4.5 mg 7/7.     In 2 weeks: Next INR.     Sharad Delgadillo M.D.

## 2024-05-23 ENCOUNTER — LAB VISIT (OUTPATIENT)
Dept: LAB | Facility: HOSPITAL | Age: 74
End: 2024-05-23
Attending: INTERNAL MEDICINE
Payer: COMMERCIAL

## 2024-05-23 DIAGNOSIS — Z79.01 CHRONIC ANTICOAGULATION: ICD-10-CM

## 2024-05-23 DIAGNOSIS — Z95.2 HISTORY OF HEART VALVE REPLACEMENT: ICD-10-CM

## 2024-05-23 LAB
INR PPP: 2.6 (ref 0.8–1.2)
PROTHROMBIN TIME: 26.8 SEC (ref 9–12.5)

## 2024-05-23 PROCEDURE — 36415 COLL VENOUS BLD VENIPUNCTURE: CPT | Mod: PO | Performed by: INTERNAL MEDICINE

## 2024-05-23 PROCEDURE — 85610 PROTHROMBIN TIME: CPT | Performed by: INTERNAL MEDICINE

## 2024-06-10 ENCOUNTER — LAB VISIT (OUTPATIENT)
Dept: LAB | Facility: HOSPITAL | Age: 74
End: 2024-06-10
Attending: FAMILY MEDICINE
Payer: MEDICARE

## 2024-06-10 DIAGNOSIS — I10 PRIMARY HYPERTENSION: ICD-10-CM

## 2024-06-10 DIAGNOSIS — R73.03 PREDIABETES: ICD-10-CM

## 2024-06-10 LAB
ALBUMIN SERPL BCP-MCNC: 4.4 G/DL (ref 3.5–5.2)
ALP SERPL-CCNC: 57 U/L (ref 55–135)
ALT SERPL W/O P-5'-P-CCNC: 29 U/L (ref 10–44)
ANION GAP SERPL CALC-SCNC: 12 MMOL/L (ref 8–16)
AST SERPL-CCNC: 22 U/L (ref 10–40)
BASOPHILS # BLD AUTO: 0.06 K/UL (ref 0–0.2)
BASOPHILS NFR BLD: 0.8 % (ref 0–1.9)
BILIRUB SERPL-MCNC: 0.6 MG/DL (ref 0.1–1)
BUN SERPL-MCNC: 23 MG/DL (ref 8–23)
CALCIUM SERPL-MCNC: 9.9 MG/DL (ref 8.7–10.5)
CHLORIDE SERPL-SCNC: 102 MMOL/L (ref 95–110)
CHOLEST SERPL-MCNC: 112 MG/DL (ref 120–199)
CHOLEST/HDLC SERPL: 3.6 {RATIO} (ref 2–5)
CO2 SERPL-SCNC: 25 MMOL/L (ref 23–29)
CREAT SERPL-MCNC: 1.1 MG/DL (ref 0.5–1.4)
DIFFERENTIAL METHOD BLD: ABNORMAL
EOSINOPHIL # BLD AUTO: 0.2 K/UL (ref 0–0.5)
EOSINOPHIL NFR BLD: 2.9 % (ref 0–8)
ERYTHROCYTE [DISTWIDTH] IN BLOOD BY AUTOMATED COUNT: 15.9 % (ref 11.5–14.5)
EST. GFR  (NO RACE VARIABLE): >60 ML/MIN/1.73 M^2
ESTIMATED AVG GLUCOSE: 117 MG/DL (ref 68–131)
GLUCOSE SERPL-MCNC: 108 MG/DL (ref 70–110)
HBA1C MFR BLD: 5.7 % (ref 4–5.6)
HCT VFR BLD AUTO: 44.6 % (ref 40–54)
HDLC SERPL-MCNC: 31 MG/DL (ref 40–75)
HDLC SERPL: 27.7 % (ref 20–50)
HGB BLD-MCNC: 14.7 G/DL (ref 14–18)
IMM GRANULOCYTES # BLD AUTO: 0.01 K/UL (ref 0–0.04)
IMM GRANULOCYTES NFR BLD AUTO: 0.1 % (ref 0–0.5)
LDLC SERPL CALC-MCNC: 53.4 MG/DL (ref 63–159)
LYMPHOCYTES # BLD AUTO: 1.9 K/UL (ref 1–4.8)
LYMPHOCYTES NFR BLD: 25.3 % (ref 18–48)
MCH RBC QN AUTO: 29.8 PG (ref 27–31)
MCHC RBC AUTO-ENTMCNC: 33 G/DL (ref 32–36)
MCV RBC AUTO: 91 FL (ref 82–98)
MONOCYTES # BLD AUTO: 0.7 K/UL (ref 0.3–1)
MONOCYTES NFR BLD: 8.9 % (ref 4–15)
NEUTROPHILS # BLD AUTO: 4.7 K/UL (ref 1.8–7.7)
NEUTROPHILS NFR BLD: 62 % (ref 38–73)
NONHDLC SERPL-MCNC: 81 MG/DL
NRBC BLD-RTO: 0 /100 WBC
PLATELET # BLD AUTO: 250 K/UL (ref 150–450)
PMV BLD AUTO: 10.6 FL (ref 9.2–12.9)
POTASSIUM SERPL-SCNC: 4.1 MMOL/L (ref 3.5–5.1)
PROT SERPL-MCNC: 7.4 G/DL (ref 6–8.4)
RBC # BLD AUTO: 4.93 M/UL (ref 4.6–6.2)
SODIUM SERPL-SCNC: 139 MMOL/L (ref 136–145)
TRIGL SERPL-MCNC: 138 MG/DL (ref 30–150)
TSH SERPL DL<=0.005 MIU/L-ACNC: 1.47 UIU/ML (ref 0.4–4)
WBC # BLD AUTO: 7.6 K/UL (ref 3.9–12.7)

## 2024-06-10 PROCEDURE — 36415 COLL VENOUS BLD VENIPUNCTURE: CPT | Mod: HCNC,PO | Performed by: FAMILY MEDICINE

## 2024-06-10 PROCEDURE — 84443 ASSAY THYROID STIM HORMONE: CPT | Mod: HCNC | Performed by: FAMILY MEDICINE

## 2024-06-10 PROCEDURE — 83036 HEMOGLOBIN GLYCOSYLATED A1C: CPT | Mod: HCNC | Performed by: FAMILY MEDICINE

## 2024-06-10 PROCEDURE — 85025 COMPLETE CBC W/AUTO DIFF WBC: CPT | Mod: HCNC | Performed by: FAMILY MEDICINE

## 2024-06-10 PROCEDURE — 80061 LIPID PANEL: CPT | Mod: HCNC | Performed by: FAMILY MEDICINE

## 2024-06-10 PROCEDURE — 80053 COMPREHEN METABOLIC PANEL: CPT | Mod: HCNC | Performed by: FAMILY MEDICINE

## 2024-06-12 ENCOUNTER — OFFICE VISIT (OUTPATIENT)
Dept: FAMILY MEDICINE | Facility: CLINIC | Age: 74
End: 2024-06-12
Payer: MEDICARE

## 2024-06-12 VITALS
HEART RATE: 59 BPM | DIASTOLIC BLOOD PRESSURE: 60 MMHG | BODY MASS INDEX: 30.7 KG/M2 | OXYGEN SATURATION: 96 % | SYSTOLIC BLOOD PRESSURE: 110 MMHG | WEIGHT: 207.25 LBS | HEIGHT: 69 IN

## 2024-06-12 DIAGNOSIS — E11.65 TYPE 2 DIABETES MELLITUS WITH HYPERGLYCEMIA, WITHOUT LONG-TERM CURRENT USE OF INSULIN: ICD-10-CM

## 2024-06-12 DIAGNOSIS — I10 PRIMARY HYPERTENSION: ICD-10-CM

## 2024-06-12 DIAGNOSIS — B37.9 INFECTION DUE TO YEAST: ICD-10-CM

## 2024-06-12 DIAGNOSIS — F33.1 MODERATE EPISODE OF RECURRENT MAJOR DEPRESSIVE DISORDER: Primary | ICD-10-CM

## 2024-06-12 PROCEDURE — 1159F MED LIST DOCD IN RCRD: CPT | Mod: CPTII,S$GLB,, | Performed by: FAMILY MEDICINE

## 2024-06-12 PROCEDURE — 3044F HG A1C LEVEL LT 7.0%: CPT | Mod: CPTII,S$GLB,, | Performed by: FAMILY MEDICINE

## 2024-06-12 PROCEDURE — 1160F RVW MEDS BY RX/DR IN RCRD: CPT | Mod: CPTII,S$GLB,, | Performed by: FAMILY MEDICINE

## 2024-06-12 PROCEDURE — 3078F DIAST BP <80 MM HG: CPT | Mod: CPTII,S$GLB,, | Performed by: FAMILY MEDICINE

## 2024-06-12 PROCEDURE — 3074F SYST BP LT 130 MM HG: CPT | Mod: CPTII,S$GLB,, | Performed by: FAMILY MEDICINE

## 2024-06-12 PROCEDURE — 1157F ADVNC CARE PLAN IN RCRD: CPT | Mod: CPTII,S$GLB,, | Performed by: FAMILY MEDICINE

## 2024-06-12 PROCEDURE — 99215 OFFICE O/P EST HI 40 MIN: CPT | Mod: S$GLB,,, | Performed by: FAMILY MEDICINE

## 2024-06-12 PROCEDURE — 3288F FALL RISK ASSESSMENT DOCD: CPT | Mod: CPTII,S$GLB,, | Performed by: FAMILY MEDICINE

## 2024-06-12 PROCEDURE — 99999 PR PBB SHADOW E&M-EST. PATIENT-LVL IV: CPT | Mod: PBBFAC,,, | Performed by: FAMILY MEDICINE

## 2024-06-12 PROCEDURE — 1101F PT FALLS ASSESS-DOCD LE1/YR: CPT | Mod: CPTII,S$GLB,, | Performed by: FAMILY MEDICINE

## 2024-06-12 RX ORDER — FERROUS SULFATE 325(65) MG
325 TABLET, DELAYED RELEASE (ENTERIC COATED) ORAL EVERY MORNING
COMMUNITY
Start: 2024-04-03

## 2024-06-12 RX ORDER — FLUCONAZOLE 150 MG/1
150 TABLET ORAL DAILY
Qty: 2 TABLET | Refills: 0 | Status: SHIPPED | OUTPATIENT
Start: 2024-06-12 | End: 2024-06-14

## 2024-06-12 NOTE — PROGRESS NOTES
Subjective     Patient ID: Kanu Carrero is a 74 y.o. male.    Chief Complaint: No chief complaint on file.    74 years old male came to the clinic for blood pressure check.  Blood pressure today was stable.  Last A1c was normal.  No flare ups of depression.    Hypertension  This is a chronic problem. The current episode started more than 1 year ago. The problem is unchanged. The problem is controlled. Pertinent negatives include no chest pain, orthopnea, palpitations, peripheral edema or PND. There are no associated agents to hypertension. Risk factors for coronary artery disease include diabetes mellitus, male gender and obesity. Past treatments include ACE inhibitors and diuretics. The current treatment provides significant improvement. There are no compliance problems.  There is no history of angina. There is no history of chronic renal disease, a hypertension causing med or a thyroid problem.   Diabetes  He presents for his follow-up diabetic visit. He has type 2 diabetes mellitus. No MedicAlert identification noted. There are no hypoglycemic associated symptoms. Pertinent negatives for diabetes include no chest pain, no polydipsia, no polyphagia and no polyuria. There are no hypoglycemic complications. Symptoms are stable. There are no diabetic complications. Risk factors for coronary artery disease include hypertension, diabetes mellitus, male sex and obesity. Current diabetic treatment includes diet and oral agent (monotherapy). He is compliant with treatment all of the time. He is following a generally healthy diet. When asked about meal planning, he reported none. He participates in exercise intermittently. An ACE inhibitor/angiotensin II receptor blocker is being taken. He does not see a podiatrist.Eye exam is current.     Review of Systems   Constitutional: Negative.    HENT: Negative.     Eyes: Negative.    Respiratory: Negative.     Cardiovascular: Negative.  Negative for chest pain, palpitations,  orthopnea and PND.   Gastrointestinal: Negative.    Endocrine: Negative for polydipsia, polyphagia and polyuria.   Genitourinary: Negative.    Musculoskeletal: Negative.    Integumentary:  Negative.   Neurological: Negative.    Psychiatric/Behavioral: Negative.            Objective     Physical Exam  Vitals and nursing note reviewed.   Constitutional:       General: He is not in acute distress.     Appearance: He is well-developed. He is not diaphoretic.   HENT:      Head: Normocephalic and atraumatic.      Right Ear: External ear normal.      Left Ear: External ear normal.      Nose: Nose normal.      Mouth/Throat:      Pharynx: No oropharyngeal exudate.   Eyes:      General: No scleral icterus.        Right eye: No discharge.         Left eye: No discharge.      Conjunctiva/sclera: Conjunctivae normal.      Pupils: Pupils are equal, round, and reactive to light.   Neck:      Thyroid: No thyromegaly.      Vascular: No JVD.      Trachea: No tracheal deviation.   Cardiovascular:      Rate and Rhythm: Normal rate and regular rhythm.      Heart sounds: Normal heart sounds. No murmur heard.     No friction rub. No gallop.   Pulmonary:      Effort: Pulmonary effort is normal. No respiratory distress.      Breath sounds: Normal breath sounds. No stridor. No wheezing or rales.   Chest:      Chest wall: No tenderness.   Abdominal:      General: Bowel sounds are normal. There is no distension.      Palpations: Abdomen is soft. There is no mass.      Tenderness: There is no abdominal tenderness. There is no guarding or rebound.   Musculoskeletal:         General: No tenderness. Normal range of motion.      Cervical back: Normal range of motion and neck supple.   Feet:      Right foot:      Protective Sensation: 10 sites tested.  10 sites sensed.      Skin integrity: No ulcer, blister, skin breakdown, erythema, warmth, callus, dry skin or fissure.      Left foot:      Protective Sensation: 10 sites tested.  10 sites sensed.       Skin integrity: No ulcer, blister, skin breakdown, erythema, warmth, callus, dry skin or fissure.   Lymphadenopathy:      Cervical: No cervical adenopathy.   Skin:     General: Skin is warm and dry.      Coloration: Skin is not pale.      Findings: No erythema or rash.   Neurological:      Mental Status: He is alert and oriented to person, place, and time.      Cranial Nerves: No cranial nerve deficit.      Motor: No abnormal muscle tone.      Coordination: Coordination normal.      Deep Tendon Reflexes: Reflexes are normal and symmetric. Reflexes normal.   Psychiatric:         Behavior: Behavior normal.         Thought Content: Thought content normal.         Judgment: Judgment normal.            Assessment and Plan     1. Moderate episode of recurrent major depressive disorder    2. Primary hypertension  Overview:  2005: Diagnosed.    Orders:  -     Lipid Panel; Future; Expected date: 06/12/2024  -     Comprehensive Metabolic Panel; Future; Expected date: 06/12/2024    3. Type 2 diabetes mellitus with hyperglycemia, without long-term current use of insulin  Overview:  3/2014: Diagnosed. Complications: CAD. Medications: Oral agent.    Orders:  -     Microalbumin/Creatinine Ratio, Urine; Future; Expected date: 06/12/2024  -     Lipid Panel; Future; Expected date: 06/12/2024  -     Hemoglobin A1C; Future; Expected date: 06/12/2024  -     Comprehensive Metabolic Panel; Future; Expected date: 06/12/2024    Continue monitoring blood sugar at home,ADA diet.   Continue monitoring blood pressure at home, low sodium diet.    Diet and physical activity to promote weight loss.     Diflucan for 2 days for yeast in the urine.  I spent a total of 40 minutes on the day of the visit.This includes face to face time and non-face to face time preparing to see the patient (eg, review of tests), obtaining and/or reviewing separately obtained history, documenting clinical information in the electronic or other health record,  independently interpreting results and communicating results to the patient/family/caregiver, or care coordinator.          Follow up in about 6 months (around 12/12/2024), or if symptoms worsen or fail to improve.

## 2024-06-14 ENCOUNTER — HOSPITAL ENCOUNTER (OUTPATIENT)
Dept: CARDIOLOGY | Facility: OTHER | Age: 74
Discharge: HOME | End: 2024-06-14
Attending: INTERNAL MEDICINE
Payer: COMMERCIAL

## 2024-06-14 VITALS
WEIGHT: 207 LBS | HEIGHT: 69 IN | BODY MASS INDEX: 30.66 KG/M2 | DIASTOLIC BLOOD PRESSURE: 60 MMHG | SYSTOLIC BLOOD PRESSURE: 110 MMHG

## 2024-06-14 DIAGNOSIS — Z95.2 HISTORY OF HEART VALVE REPLACEMENT: ICD-10-CM

## 2024-06-14 DIAGNOSIS — I50.22 HEART FAILURE, SYSTOLIC, CHRONIC: ICD-10-CM

## 2024-06-14 DIAGNOSIS — I35.9 AORTIC VALVE DISEASE: ICD-10-CM

## 2024-06-14 LAB
AORTIC ROOT ANNULUS: 2.85 CM
ASCENDING AORTA: 2.81 CM
AV INDEX (PROSTH): 0.37
AV MEAN GRADIENT: 23 MMHG
AV PEAK GRADIENT: 48 MMHG
AV REGURGITATION PRESSURE HALF TIME: 532.08 MS
AV VALVE AREA BY VELOCITY RATIO: 1.14 CM²
AV VALVE AREA: 1.15 CM²
AV VELOCITY RATIO: 0.37
BSA FOR ECHO PROCEDURE: 2.14 M2
CV ECHO LV RWT: 0.66 CM
DOP CALC AO PEAK VEL: 3.45 M/S
DOP CALC AO VTI: 68.9 CM
DOP CALC LVOT AREA: 3.1 CM2
DOP CALC LVOT DIAMETER: 1.99 CM
DOP CALC LVOT PEAK VEL: 1.27 M/S
DOP CALC LVOT STROKE VOLUME: 78.96 CM3
DOP CALCLVOT PEAK VEL VTI: 25.4 CM
E WAVE DECELERATION TIME: 291.55 MSEC
E/A RATIO: 0.83
E/E' RATIO: 10.77 M/S
ECHO LV POSTERIOR WALL: 1.43 CM (ref 0.6–1.1)
EJECTION FRACTION: 50 %
FRACTIONAL SHORTENING: 29 % (ref 28–44)
GLOBAL LONGITUIDAL STRAIN: 16 %
INTERVENTRICULAR SEPTUM: 1.39 CM (ref 0.6–1.1)
IVC DIAMETER: 1.91 CM
LA MAJOR: 6.04 CM
LA MINOR: 5.13 CM
LA WIDTH: 3.8 CM
LAAS-AP2: 21 CM2
LAAS-AP4: 20 CM2
LEFT ATRIUM SIZE: 3.47 CM
LEFT ATRIUM VOLUME INDEX MOD: 32.4 ML/M2
LEFT ATRIUM VOLUME INDEX: 29.6 ML/M2
LEFT ATRIUM VOLUME MOD: 67.94 CM3
LEFT ATRIUM VOLUME: 62.18 CM3
LEFT INTERNAL DIMENSION IN SYSTOLE: 3.06 CM (ref 2.1–4)
LEFT VENTRICLE DIASTOLIC VOLUME INDEX: 40.4 ML/M2
LEFT VENTRICLE DIASTOLIC VOLUME: 84.85 ML
LEFT VENTRICLE MASS INDEX: 113 G/M2
LEFT VENTRICLE SYSTOLIC VOLUME INDEX: 17.5 ML/M2
LEFT VENTRICLE SYSTOLIC VOLUME: 36.78 ML
LEFT VENTRICULAR INTERNAL DIMENSION IN DIASTOLE: 4.34 CM (ref 3.5–6)
LEFT VENTRICULAR MASS: 237.98 G
LV LATERAL E/E' RATIO: 10 M/S
LV SEPTAL E/E' RATIO: 11.67 M/S
LVOT MG: 3.37 MMHG
LVOT MV: 0.85 CM/S
MV PEAK A VEL: 0.84 M/S
MV PEAK E VEL: 0.7 M/S
OHS CV RV/LV RATIO: 0.39 CM
PISA AR MAX VEL: 4.73 M/S
PISA TR MAX VEL: 2.05 M/S
PV MV: 0.73 M/S
PV PEAK GRADIENT: 4 MMHG
PV PEAK VELOCITY: 1.06 M/S
RA MAJOR: 4.6 CM
RA PRESSURE ESTIMATED: 3 MMHG
RA WIDTH: 2.2 CM
RIGHT VENTRICULAR END-DIASTOLIC DIMENSION: 1.71 CM
RV TB RVSP: 5 MMHG
SINUS: 2.7 CM
STJ: 2.61 CM
TDI LATERAL: 0.07 M/S
TDI SEPTAL: 0.06 M/S
TDI: 0.07 M/S
TR MAX PG: 17 MMHG
TV REST PULMONARY ARTERY PRESSURE: 20 MMHG
Z-SCORE OF LEFT VENTRICULAR DIMENSION IN END DIASTOLE: -4.08
Z-SCORE OF LEFT VENTRICULAR DIMENSION IN END SYSTOLE: -2.1

## 2024-06-14 PROCEDURE — 93306 TTE W/DOPPLER COMPLETE: CPT | Mod: 26,,, | Performed by: INTERNAL MEDICINE

## 2024-06-14 PROCEDURE — 93306 TTE W/DOPPLER COMPLETE: CPT

## 2024-06-14 PROCEDURE — 93356 MYOCRD STRAIN IMG SPCKL TRCK: CPT | Mod: ,,, | Performed by: INTERNAL MEDICINE

## 2024-07-03 RX ORDER — FERROUS SULFATE 325(65) MG
TABLET, DELAYED RELEASE (ENTERIC COATED) ORAL
Qty: 90 TABLET | Refills: 3 | Status: SHIPPED | OUTPATIENT
Start: 2024-07-03

## 2024-08-02 ENCOUNTER — LAB VISIT (OUTPATIENT)
Dept: LAB | Facility: HOSPITAL | Age: 74
End: 2024-08-02
Attending: INTERNAL MEDICINE
Payer: MEDICARE

## 2024-08-02 DIAGNOSIS — Z95.2 HISTORY OF HEART VALVE REPLACEMENT: ICD-10-CM

## 2024-08-02 DIAGNOSIS — Z79.01 CHRONIC ANTICOAGULATION: ICD-10-CM

## 2024-08-02 LAB
INR PPP: 2.7 (ref 0.8–1.2)
PROTHROMBIN TIME: 28.2 SEC (ref 9–12.5)

## 2024-08-02 PROCEDURE — 36415 COLL VENOUS BLD VENIPUNCTURE: CPT | Mod: HCNC,PO | Performed by: INTERNAL MEDICINE

## 2024-08-02 PROCEDURE — 85610 PROTHROMBIN TIME: CPT | Mod: HCNC | Performed by: INTERNAL MEDICINE

## 2024-08-05 ENCOUNTER — TELEPHONE (OUTPATIENT)
Dept: CARDIOLOGY | Facility: CLINIC | Age: 74
End: 2024-08-05
Payer: MEDICARE

## 2024-09-06 ENCOUNTER — OFFICE VISIT (OUTPATIENT)
Dept: CARDIOLOGY | Facility: CLINIC | Age: 74
End: 2024-09-06
Attending: INTERNAL MEDICINE
Payer: OTHER MISCELLANEOUS

## 2024-09-06 VITALS
DIASTOLIC BLOOD PRESSURE: 58 MMHG | OXYGEN SATURATION: 96 % | WEIGHT: 210.56 LBS | SYSTOLIC BLOOD PRESSURE: 121 MMHG | HEIGHT: 69 IN | BODY MASS INDEX: 31.19 KG/M2 | HEART RATE: 68 BPM

## 2024-09-06 DIAGNOSIS — Z95.5 HISTORY OF CORONARY ARTERY STENT PLACEMENT: ICD-10-CM

## 2024-09-06 DIAGNOSIS — I35.9 AORTIC VALVE DISEASE: ICD-10-CM

## 2024-09-06 DIAGNOSIS — E78.00 HYPERCHOLESTEROLEMIA: ICD-10-CM

## 2024-09-06 DIAGNOSIS — E66.9 MILD OBESITY: ICD-10-CM

## 2024-09-06 DIAGNOSIS — I44.7 LEFT BUNDLE BRANCH BLOCK: ICD-10-CM

## 2024-09-06 DIAGNOSIS — E11.59 TYPE 2 DIABETES MELLITUS WITH OTHER CIRCULATORY COMPLICATION, WITHOUT LONG-TERM CURRENT USE OF INSULIN: ICD-10-CM

## 2024-09-06 DIAGNOSIS — Z95.2 HISTORY OF HEART VALVE REPLACEMENT: ICD-10-CM

## 2024-09-06 DIAGNOSIS — K21.9 GASTROESOPHAGEAL REFLUX DISEASE WITHOUT ESOPHAGITIS: ICD-10-CM

## 2024-09-06 DIAGNOSIS — I25.10 CORONARY ARTERY DISEASE INVOLVING NATIVE CORONARY ARTERY OF NATIVE HEART WITHOUT ANGINA PECTORIS: ICD-10-CM

## 2024-09-06 DIAGNOSIS — Z79.01 CHRONIC ANTICOAGULATION: ICD-10-CM

## 2024-09-06 DIAGNOSIS — I50.22 HEART FAILURE, SYSTOLIC, CHRONIC: ICD-10-CM

## 2024-09-06 DIAGNOSIS — I10 PRIMARY HYPERTENSION: ICD-10-CM

## 2024-09-06 PROCEDURE — 99999 PR PBB SHADOW E&M-EST. PATIENT-LVL III: CPT | Mod: PBBFAC,,, | Performed by: INTERNAL MEDICINE

## 2024-09-06 RX ORDER — CHLORTHALIDONE 25 MG/1
25 TABLET ORAL DAILY
Qty: 90 TABLET | Refills: 3 | Status: SHIPPED | OUTPATIENT
Start: 2024-09-06

## 2024-09-06 RX ORDER — AMLODIPINE BESYLATE 10 MG/1
10 TABLET ORAL DAILY
Qty: 90 TABLET | Refills: 3 | Status: SHIPPED | OUTPATIENT
Start: 2024-09-06

## 2024-09-06 RX ORDER — WARFARIN 3 MG/1
TABLET ORAL
Qty: 140 TABLET | Refills: 3 | Status: SHIPPED | OUTPATIENT
Start: 2024-09-06

## 2024-09-06 RX ORDER — ROSUVASTATIN CALCIUM 20 MG/1
20 TABLET, COATED ORAL DAILY
Qty: 90 TABLET | Refills: 3 | Status: SHIPPED | OUTPATIENT
Start: 2024-09-06

## 2024-09-06 RX ORDER — EZETIMIBE 10 MG/1
10 TABLET ORAL DAILY
Qty: 90 TABLET | Refills: 3 | Status: SHIPPED | OUTPATIENT
Start: 2024-09-06

## 2024-09-06 RX ORDER — RAMIPRIL 10 MG/1
10 CAPSULE ORAL DAILY
Qty: 90 CAPSULE | Refills: 3 | Status: SHIPPED | OUTPATIENT
Start: 2024-09-06

## 2024-09-06 NOTE — PROGRESS NOTES
Subjective:     Kanu Carrero is a 74 y.o.male with hypertension, hypercholesterolemia and diabetes mellitus type 2. He is mildly obese. He has coronary artery disease with mild left main disease and a history of an intervention of the right coronary artery in 2008. He has left bundle branch block. He had moderate to severe aortic stenosis. In the fall of 2013 developed mild exertional chest pressure. He underwent cardiac catheterization on 1/30/2014 which revealed an aortic valve area of 1.1 cm2. No obstructive coronary artery disease was seen with an about 40% left main stenosis. There was mild left ventricular dysfunction. He underwent aortic valve replacement receiving a [employer] mechanical valve on 3/11/2014. He was begun on warfarin with anticoagulation. He was diagnosed with iron deficiency anemia in 10/2016. He underwent an esophagogastroduodenoscopy and colonoscopy that he said were negative. It was decided to discontine aspirin that he was then taking in addition to being anticoagulated. On 11/4/2022 he began empagliflozin. On 12/19/2022 he underwent arthroscopic surgery of the left knee. No exertional chest discomfort or exertional dyspnea. No palpitations or weak spells. No bleeding. No issues with any of his prescribed medications. Feeling well overall.      Coronary Artery Disease  Presents for follow-up visit. The disease course has been stable. The condition has lasted for  years. Pertinent negatives include no chest pain, chest pressure, chest tightness, dizziness, leg swelling, muscle weakness, palpitations, shortness of breath or weight gain. Risk factors include diabetes, hyperlipidemia, hypertension and obesity. Risk factors do not include decreased physical activity. His past medical history is significant for CHF. There is no history of arrhythmia. The symptoms have been stable.   Congestive Heart Failure  Presents for follow-up visit. The disease course has been stable. The condition has  lasted for  years. Pertinent negatives include no abdominal pain, chest pain, chest pressure, claudication, edema, fatigue, muscle weakness, near-syncope, nocturia, orthopnea, palpitations, paroxysmal nocturnal dyspnea, shortness of breath or unexpected weight change. The symptoms have been stable. His past medical history is significant for CAD, DM and HTN. There is no history of arrhythmia or chronic lung disease.   Hypertension  This is a chronic problem. The current episode started more than 1 year ago. The problem is unchanged. The problem is controlled (usually 115-125/55-60 mmHg at home). Pertinent negatives include no anxiety, blurred vision, chest pain, headaches, malaise/fatigue, neck pain, orthopnea, palpitations, peripheral edema, PND, shortness of breath or sweats. There is no history of chronic renal disease.   Hyperlipidemia  This is a chronic problem. The current episode started more than 1 year ago. The problem is controlled. Recent lipid tests were reviewed and are variable. Exacerbating diseases include diabetes and obesity. He has no history of chronic renal disease, hypothyroidism, liver disease or nephrotic syndrome. Pertinent negatives include no chest pain, focal sensory loss, focal weakness, leg pain, myalgias or shortness of breath.       Review of Systems   Constitutional: Negative for chills, fatigue, fever, malaise/fatigue, unexpected weight change and weight gain.   HENT:  Negative for hoarse voice and nosebleeds.    Eyes:  Negative for blurred vision, pain, vision loss in left eye and vision loss in right eye.   Cardiovascular:  Negative for chest pain, claudication, dyspnea on exertion, irregular heartbeat, leg swelling, near-syncope, orthopnea, palpitations, paroxysmal nocturnal dyspnea and syncope.   Respiratory:  Negative for chest tightness, cough, hemoptysis, shortness of breath and wheezing.    Endocrine: Negative for cold intolerance and heat intolerance.    Hematologic/Lymphatic: Negative for bleeding problem. Does not bruise/bleed easily.   Skin:  Negative for color change and rash.   Musculoskeletal:  Negative for back pain, falls, gout, muscle weakness, myalgias and neck pain.   Gastrointestinal:  Negative for abdominal pain, heartburn, hematemesis, hematochezia, hemorrhoids, jaundice, melena, nausea and vomiting.   Genitourinary:  Negative for dysuria, hematuria and nocturia.   Neurological:  Negative for dizziness, focal weakness, headaches, light-headedness, loss of balance, numbness, tremors and vertigo.   Psychiatric/Behavioral:  Negative for altered mental status, depression and memory loss. The patient is not nervous/anxious.    Allergic/Immunologic: Negative for hives and persistent infections.       Current Outpatient Medications on File Prior to Visit   Medication Sig Dispense Refill    acetaminophen (TYLENOL) 500 MG tablet Take 500 mg by mouth every 6 (six) hours as needed for Pain.      allopurinoL (ZYLOPRIM) 300 MG tablet Take 300 mg by mouth.      amLODIPine (NORVASC) 10 MG tablet Take 1 tablet (10 mg total) by mouth once daily. 90 tablet 3    chlorthalidone (HYGROTEN) 25 MG Tab Take 1 tablet (25 mg total) by mouth once daily. 90 tablet 3    cyanocobalamin (VITAMIN B-12) 100 MCG tablet Take 100 mcg by mouth once daily.      empagliflozin (JARDIANCE) 10 mg tablet Take 1 tablet (10 mg total) by mouth once daily. 90 tablet 3    EScitalopram oxalate (LEXAPRO) 20 MG tablet Take 1 tablet (20 mg total) by mouth once daily. 90 tablet 4    ezetimibe (ZETIA) 10 mg tablet Take 1 tablet (10 mg total) by mouth once daily. 90 tablet 3    ferrous sulfate 325 (65 FE) MG EC tablet TAKE 1 TABLET BY MOUTH ONCE DAILY WITH BREAKFAST 90 tablet 3    MULTIVITAMIN W-MINERALS/LUTEIN (CENTRUM SILVER ORAL) Take 1 tablet by mouth once daily.      omeprazole (PRILOSEC) 20 MG capsule Take 20 mg by mouth once daily.   11    ramipriL (ALTACE) 10 MG capsule Take 1 capsule (10 mg  "total) by mouth once daily. 90 capsule 3    rosuvastatin (CRESTOR) 20 MG tablet Take 1 tablet (20 mg total) by mouth once daily. 90 tablet 3    tamsulosin (FLOMAX) 0.4 mg Cap TAKE 1 CAPSULE(0.4 MG) BY MOUTH EVERY DAY 90 capsule 3    vitamin D (VITAMIN D3) 1000 units Tab Take 1,000 Units by mouth once daily.      warfarin (JANTOVEN) 3 MG tablet DOSE TO BE ADJUSTED ACCORDING TO INTERNATIONAL NORMALIZED RATIO. 140 tablet 3     No current facility-administered medications on file prior to visit.       BP (!) 121/58 (BP Location: Left arm, Patient Position: Sitting, BP Method: Large (Automatic))   Pulse 68   Ht 5' 9" (1.753 m)   Wt 95.5 kg (210 lb 8.6 oz)   SpO2 96%   BMI 31.09 kg/m²     Objective:     Physical Exam  Constitutional:       General: He is not in acute distress.     Appearance: Normal appearance. He is well-developed. He is not ill-appearing or diaphoretic.   HENT:      Head: Normocephalic and atraumatic.      Nose: Nose normal.   Eyes:      General:         Right eye: No discharge.         Left eye: No discharge.      Conjunctiva/sclera:      Right eye: Right conjunctiva is not injected.      Left eye: Left conjunctiva is not injected.      Pupils: Pupils are equal.      Right eye: Pupil is round.      Left eye: Pupil is round.   Neck:      Thyroid: No thyroid mass or thyromegaly.      Vascular: No carotid bruit or JVD.   Cardiovascular:      Rate and Rhythm: Normal rate and regular rhythm. No extrasystoles are present.     Chest Wall: PMI is not displaced.      Pulses:           Radial pulses are 2+ on the right side and 2+ on the left side.        Femoral pulses are 2+ on the right side and 2+ on the left side.       Dorsalis pedis pulses are 2+ on the right side and 2+ on the left side.        Posterior tibial pulses are 2+ on the right side and 2+ on the left side.      Heart sounds: S1 normal. Murmur heard.      Harsh midsystolic murmur is present with a grade of 3/6 at the upper right sternal " border. Mechanical S2.      No gallop.      Comments: Mechanical S2.  Pulmonary:      Effort: Pulmonary effort is normal.      Breath sounds: Normal breath sounds.   Abdominal:      Palpations: Abdomen is soft.      Tenderness: There is no abdominal tenderness.   Musculoskeletal:      Cervical back: Neck supple.      Right ankle: No swelling, deformity or ecchymosis.      Left ankle: No swelling, deformity or ecchymosis.   Lymphadenopathy:      Head:      Right side of head: No submandibular adenopathy.      Left side of head: No submandibular adenopathy.      Cervical: No cervical adenopathy.   Skin:     General: Skin is warm and dry.   Neurological:      General: No focal deficit present.      Mental Status: He is alert and oriented to person, place, and time. He is not disoriented.      Cranial Nerves: No cranial nerve deficit.   Psychiatric:         Attention and Perception: Attention and perception normal.         Mood and Affect: Mood and affect normal.         Speech: Speech normal.         Behavior: Behavior normal.         Thought Content: Thought content normal.         Cognition and Memory: Cognition and memory normal.         Judgment: Judgment normal.          Assessment:     1. Aortic valve disease    2. History of heart valve replacement    3. Chronic anticoagulation    4. Coronary artery disease involving native coronary artery of native heart without angina pectoris    5. History of coronary artery stent placement    6. Heart failure, systolic, chronic    7. Left bundle branch block    8. Primary hypertension    9. Hypercholesterolemia    10. Type 2 diabetes mellitus with other circulatory complication, without long-term current use of insulin    11. Mild obesity    12. Gastroesophageal reflux disease without esophagitis        Plan:     1. Aortic Valve Disease              5/22/2013: Echo: Mild AS 3.0 m/s.   Fall 2013: Mild to moderate exertional chest pressure.   1/21/2014: Echo: Normal LV size  with low normal LV function. EF 50-55%. LBBB. Moderate AS - 3.2 m/s - 1.2 cm2. Mild to moderate MR.              1/21/2014: Carotid Duplex: Mild plaquing.              [place] [facility] [place] Severe [place] stenosis - 1.1 cm2. Mean gradient 47 mm Hg. Mild CAD. Mild LV dysfunction with EF 50%.              3/11/2014[place] [facility] [place]: [employer] Mechanical Valve - 23 mm.   5/28/2014: Echo: Mechanical aortic valve - 2.4 m/s - mild AR.   5/25/2021: Echo: Mechanical AVR - fine - MG 16 mmHg - DI 0.53.   6/14/2024: Echo: [facility] AVR - 3.5 m/s - MG 23 mmHG - DI 0.37 - 1.2 cm2. Mild AR.    On warfarin.   Knows about endocarditis prophylaxis.   Stable.    2. Chronic Anticoagulation   3/2014: Began warfarin for mechanical aortic valve. Goal INR 2-3. Anticoagulation managed by Dr. Delgadillo.    2018: Aspirin 81 mg was discontinued due to iron deficiency anemia.   On warfarin.   INR followed.   No aspirin or NSAIDs.    No obvious bleeding.    3. Coronary Artery Disease   2008: [facility]: Cath: RCA: Stent.   6[place]8/2010[place] [facility] [place]: LM 40%. RCA: Stent patent. EF 40-45%.              No aspirin as he is anticoagulated and has iron deficiency anemia.              Appears stable.    4. Heart Failure, Systolic and Diastolic, Chronic   2005: Diagnosed. EF 40-45%.   5/22/2013: Echo: EF 50-55%.   5/28/2014: Echo: Normal left ventricular size and function. Mild LVH. Mildly dilated LA. Mechanical aortic valve - 2.4 m/s - mild AR.   9/8/2016: Echo: Normal left ventricular size and systolic function. Mild LVH. Moderate diastolic dysfunction. LBBB. Moderately dilated LA. Mechanical AVR fine - 2.7 m/s.   5/25/2021: Echo: The left ventricle is mildly dilated with low normal systolic function. EF 50%. The mid anteroseptal wall and apical septum are moderately hypokinetic. The basal inferolateral wall is severely hypokinetic. The remainder contract well. LBBB. Mild diastolic dysfunction. Mildly dilated LA. Mechanical AVR - fine - MG 16 mmHg - DI 0.53.   6/5/2023:  Echo: Normal left ventricular size with low normal systolic function. LBBB. Apical septum mildly hypokinetic. EF 50%. Mild diastolic dysfunction. [facility] AVR - fine.    6/14/2024: Echo: Normal left ventricular size with low normal systolic function. LBBB. Septum moderately hypokinetic. EF 50%. LVGLS -16%. Mild diastolic dysfunction. [facility] AVR - 3.5 m/s - MG 23 mmHG - DI 0.37 - 1.2 cm2. Mild AR.    3/1/2019: Metoprolol was discontinued due to HR of 46 bpm.    11/4/2022: Empagliflozin 10 mg Q24 was begun.              On empagliflozin 10 mg Q24, ramipril 10 mg Q24 and chlorthalidone 25 mg Q24.   Well compensated.   6/2025: Plan next Echo. Then every few years.     5. Left Bundle Branch Block              2005: Diagnosed.   9/8/2017: SB. LBBB.  ms.   10/19/2018: SB 52. LBBB with  msec.   3/1/2019: HR 46 bpm.   7/5/2019: HR 68 bpm.   5/20/2021: HR 67.  ms. LBBB with  ms.   7/1/2022: ECG: LBBB.  ms.   12/15/2023: ECG: SB 70. LBBB.  ms.              Followed.    Would be a very good candidate for LBBAP or CRT.               6. Hypertension              2005: Diagnosed.   3/1/2019: Metoprolol was discontinued due to HR of 46 bpm.    On amlodipine 10 mg Q24, ramipril 10 mg Q24 and chlorthalidone 25 mg Q24.   Leg edema resolved with diuretic.   Keeping log at home.   Well controlled.     7. Hypercholesterolemia   2007: Began statin.              On atorvastatin 80 mg Q24.   12/16/2016: Chol 134. HDL 35. LDL 74. .   May have had some muscle aches while on atorvastatin.    On rosuvastatin 10 mg Q24.   9/13/2017: Chol 158. HDL 33. LDL 96. .   On rosuvastatin 20 mg Q24.   9/14/2018: Chol 153. HDL 35. LDL 92. .   2/20/2019: Chol 151. HDL 35. LDL 86. .   7/9/2020: Chol 169. HDL 40. LDL 95. .   On rosuvastatin 20 mg Q24.   9/16/2020: Ezetimibe 10 mg Q24 was begun.   On rosuvastatin 20 mg Q24 and ezetimibe 10 mg Q24    11/18/2020: Chol 109. HDL 34. LDL 50. TG  125.   6/3/2021: Chol 112. HDL 33. LDL 49. .   6/3/2022: Chol 113. HDL 41. LDL 48. .   12/1/2022: Chol 129. HDL 38. LDL 65. .   6/2/2023: Chol 117. HDL 33. LDL 52. .   11/29/2023: Chol 123. HDL 37. LDL 67. TG 96.   6/10/2024: Chol 112. HDL 31. LDL 53. .   On rosuvastatin 20 mg Q24 and ezetimibe 10 mg Q24    Very favorable lipid panel.     8. Diabetes Mellitus, Type 2   3/2014: Diagnosed. Complications: CAD. Medications: Oral agent.   11/4/2022: Empagliflozin 10 mg Q24 was begun for HF. Metformin 500 mg Q12 was discontinued.    6/3/2022: HgbA1C 5.8%.   12/1/2022: HgbA1C 5.9%.   6/10/2024: HgbA1C 5.7%.   On empagliflozin 10 mg Q24 that was initiated for HF.    Well controlled.     9. Mild Obesity   5/5/2017: Weight 97 kg. BMI 32.   9/8/2017: Weight 95 kg. BMI 31.   6/1/2018: Weight 92 kg. BMI 30.   10/19/2018: Weight 89 kg. BMI 29.   3/1/2019: Weight 94 kg. BMI 31.   11/1/2019: Weight 92 kg. BMI 30.   9/16/2020: Weight 96 kg. BMI 31.   3/13/2023: Weight 89 kg. BMI 29.   12/15/2023: Weight 94 kg. BMI 31.     Encouraged to lose weight.    10. Gastroesophageal Reflux Disease   2008: Diagnosed.   On omeprazole 20 mg Q24.    11. Anemia   10/2016: Diagnosed with iron deficiency anemia.   10/4/2016: EGD & Colonoscopy: Negative.   Receiving iron.    12. History of Knee Surgery   12/19/2022: Arthroscopic surgery of the left knee.    13. Primary Care              Dr. Olvin Hanley.    F/u 4 months.    Sharad Delgadillo M.D.

## 2024-09-07 ENCOUNTER — PATIENT MESSAGE (OUTPATIENT)
Dept: CARDIOLOGY | Facility: CLINIC | Age: 74
End: 2024-09-07
Payer: MEDICARE

## 2024-09-25 ENCOUNTER — PATIENT OUTREACH (OUTPATIENT)
Dept: ADMINISTRATIVE | Facility: HOSPITAL | Age: 74
End: 2024-09-25
Payer: MEDICARE

## 2024-09-25 NOTE — PROGRESS NOTES
Health Maintenance Topic(s) Outreach Outcomes & Actions Taken:    Lab(s) - Outreach Outcomes & Actions Taken  : Overdue Lab(s) Scheduled

## 2024-10-22 ENCOUNTER — TELEPHONE (OUTPATIENT)
Dept: CARDIOLOGY | Facility: CLINIC | Age: 74
End: 2024-10-22
Payer: MEDICARE

## 2024-10-22 DIAGNOSIS — Z95.2 HISTORY OF HEART VALVE REPLACEMENT: Primary | ICD-10-CM

## 2024-10-22 DIAGNOSIS — Z79.01 CHRONIC ANTICOAGULATION: ICD-10-CM

## 2024-10-22 NOTE — TELEPHONE ENCOUNTER
----- Message from Gabriella sent at 10/22/2024 12:17 PM CDT -----  Regarding: orders  Name of caller: Kanu       What is the requesting detail: requesting orders for INR. Please advise       Can the clinic reply by [facility]:       What number to call back:[phone]

## 2024-10-24 ENCOUNTER — LAB VISIT (OUTPATIENT)
Dept: LAB | Facility: HOSPITAL | Age: 74
End: 2024-10-24
Attending: INTERNAL MEDICINE
Payer: OTHER MISCELLANEOUS

## 2024-10-24 DIAGNOSIS — Z79.01 CHRONIC ANTICOAGULATION: ICD-10-CM

## 2024-10-24 DIAGNOSIS — Z95.2 HISTORY OF HEART VALVE REPLACEMENT: ICD-10-CM

## 2024-10-24 LAB
INR PPP: 2.3 (ref 0.8–1.2)
PROTHROMBIN TIME: 24.1 SEC (ref 9–12.5)

## 2024-10-24 PROCEDURE — 85610 PROTHROMBIN TIME: CPT | Performed by: INTERNAL MEDICINE

## 2024-10-24 PROCEDURE — 36415 COLL VENOUS BLD VENIPUNCTURE: CPT | Mod: PO | Performed by: INTERNAL MEDICINE

## 2024-12-02 ENCOUNTER — LAB VISIT (OUTPATIENT)
Dept: LAB | Facility: HOSPITAL | Age: 74
End: 2024-12-02
Attending: INTERNAL MEDICINE
Payer: OTHER MISCELLANEOUS

## 2024-12-02 DIAGNOSIS — Z95.2 HISTORY OF HEART VALVE REPLACEMENT: ICD-10-CM

## 2024-12-02 DIAGNOSIS — Z79.01 CHRONIC ANTICOAGULATION: ICD-10-CM

## 2024-12-02 LAB
INR PPP: 3.4 (ref 0.8–1.2)
PROTHROMBIN TIME: 34.9 SEC (ref 9–12.5)

## 2024-12-02 PROCEDURE — 85610 PROTHROMBIN TIME: CPT | Performed by: INTERNAL MEDICINE

## 2024-12-02 PROCEDURE — 36415 COLL VENOUS BLD VENIPUNCTURE: CPT | Mod: PO | Performed by: INTERNAL MEDICINE

## 2024-12-03 ENCOUNTER — TELEPHONE (OUTPATIENT)
Dept: CARDIOLOGY | Facility: CLINIC | Age: 74
End: 2024-12-03
Payer: MEDICARE

## 2024-12-03 NOTE — TELEPHONE ENCOUNTER
Pt notified and verbalized understanding.    ----- Message from Sharad Delgadillo MD sent at 12/3/2024  7:38 AM CST -----  5/8/2024: INR 3.1.     Continue current dose: Warfarin 4.5 mg 7/7.     5/23/2024: INR 2.6.     Continue current dose: Warfarin 4.5 mg 7/7.     8/2/2024: INR 2.7.     Continue current dose: Warfarin 4.5 mg 7/7.     10/24/2024: INR 2.3.     Continue current dose: Warfarin 4.5 mg 7/7.    12/2/2024: INR 3.4.    Decrease current dose: Warfarin 4.5 mg 5/7 and warfarin 3 mg 2/7.     In 2 weeks: Next INR.     Sharad Delgadillo M.D.

## 2024-12-09 ENCOUNTER — LAB VISIT (OUTPATIENT)
Dept: LAB | Facility: HOSPITAL | Age: 74
End: 2024-12-09
Attending: FAMILY MEDICINE
Payer: MEDICARE

## 2024-12-09 DIAGNOSIS — E11.65 TYPE 2 DIABETES MELLITUS WITH HYPERGLYCEMIA, WITHOUT LONG-TERM CURRENT USE OF INSULIN: ICD-10-CM

## 2024-12-09 DIAGNOSIS — I10 PRIMARY HYPERTENSION: ICD-10-CM

## 2024-12-09 LAB
ALBUMIN SERPL BCP-MCNC: 4.4 G/DL (ref 3.5–5.2)
ALP SERPL-CCNC: 64 U/L (ref 40–150)
ALT SERPL W/O P-5'-P-CCNC: 43 U/L (ref 10–44)
ANION GAP SERPL CALC-SCNC: 9 MMOL/L (ref 8–16)
AST SERPL-CCNC: 24 U/L (ref 10–40)
BILIRUB SERPL-MCNC: 0.7 MG/DL (ref 0.1–1)
BUN SERPL-MCNC: 12 MG/DL (ref 8–23)
CALCIUM SERPL-MCNC: 9.8 MG/DL (ref 8.7–10.5)
CHLORIDE SERPL-SCNC: 103 MMOL/L (ref 95–110)
CHOLEST SERPL-MCNC: 113 MG/DL (ref 120–199)
CHOLEST/HDLC SERPL: 2.9 {RATIO} (ref 2–5)
CO2 SERPL-SCNC: 27 MMOL/L (ref 23–29)
CREAT SERPL-MCNC: 1.1 MG/DL (ref 0.5–1.4)
EST. GFR  (NO RACE VARIABLE): >60 ML/MIN/1.73 M^2
ESTIMATED AVG GLUCOSE: 114 MG/DL (ref 68–131)
GLUCOSE SERPL-MCNC: 91 MG/DL (ref 70–110)
HBA1C MFR BLD: 5.6 % (ref 4–5.6)
HDLC SERPL-MCNC: 39 MG/DL (ref 40–75)
HDLC SERPL: 34.5 % (ref 20–50)
LDLC SERPL CALC-MCNC: 50.4 MG/DL (ref 63–159)
NONHDLC SERPL-MCNC: 74 MG/DL
POTASSIUM SERPL-SCNC: 4.1 MMOL/L (ref 3.5–5.1)
PROT SERPL-MCNC: 7.6 G/DL (ref 6–8.4)
SODIUM SERPL-SCNC: 139 MMOL/L (ref 136–145)
TRIGL SERPL-MCNC: 118 MG/DL (ref 30–150)

## 2024-12-09 PROCEDURE — 36415 COLL VENOUS BLD VENIPUNCTURE: CPT | Mod: HCNC,PO | Performed by: FAMILY MEDICINE

## 2024-12-09 PROCEDURE — 83036 HEMOGLOBIN GLYCOSYLATED A1C: CPT | Mod: HCNC | Performed by: FAMILY MEDICINE

## 2024-12-09 PROCEDURE — 80061 LIPID PANEL: CPT | Mod: HCNC | Performed by: FAMILY MEDICINE

## 2024-12-09 PROCEDURE — 80053 COMPREHEN METABOLIC PANEL: CPT | Mod: HCNC | Performed by: FAMILY MEDICINE

## 2024-12-12 ENCOUNTER — OFFICE VISIT (OUTPATIENT)
Dept: FAMILY MEDICINE | Facility: CLINIC | Age: 74
End: 2024-12-12
Payer: MEDICARE

## 2024-12-12 VITALS
OXYGEN SATURATION: 97 % | DIASTOLIC BLOOD PRESSURE: 52 MMHG | BODY MASS INDEX: 31 KG/M2 | HEART RATE: 65 BPM | SYSTOLIC BLOOD PRESSURE: 90 MMHG | HEIGHT: 69 IN | WEIGHT: 209.31 LBS

## 2024-12-12 DIAGNOSIS — R80.9 MICROALBUMINURIA: ICD-10-CM

## 2024-12-12 DIAGNOSIS — E78.00 HYPERCHOLESTEROLEMIA: ICD-10-CM

## 2024-12-12 DIAGNOSIS — I25.10 CORONARY ARTERY DISEASE INVOLVING NATIVE CORONARY ARTERY OF NATIVE HEART WITHOUT ANGINA PECTORIS: ICD-10-CM

## 2024-12-12 DIAGNOSIS — E11.65 TYPE 2 DIABETES MELLITUS WITH HYPERGLYCEMIA, WITHOUT LONG-TERM CURRENT USE OF INSULIN: ICD-10-CM

## 2024-12-12 DIAGNOSIS — I10 PRIMARY HYPERTENSION: Primary | ICD-10-CM

## 2024-12-12 DIAGNOSIS — G25.0 BENIGN ESSENTIAL TREMOR: ICD-10-CM

## 2024-12-12 DIAGNOSIS — E66.811 CLASS 1 OBESITY WITH BODY MASS INDEX (BMI) OF 30.0 TO 30.9 IN ADULT, UNSPECIFIED OBESITY TYPE, UNSPECIFIED WHETHER SERIOUS COMORBIDITY PRESENT: ICD-10-CM

## 2024-12-12 PROCEDURE — 99999 PR PBB SHADOW E&M-EST. PATIENT-LVL III: CPT | Mod: PBBFAC,HCNC,, | Performed by: FAMILY MEDICINE

## 2024-12-12 NOTE — PROGRESS NOTES
Subjective     Patient ID: Kanu Carrero is a 74 y.o. male.    Chief Complaint: Hypertension, Diabetes, and Hyperlipidemia    74 years old male came to the clinic for blood pressure check.  Blood pressure in the low side.  Last A1c at the level of prediabetes.  Patient with good compliance with cholesterol regimen.    Hypertension  This is a chronic problem. The current episode started more than 1 year ago. The problem is unchanged. The problem is controlled. Pertinent negatives include no chest pain, orthopnea, palpitations or peripheral edema. There are no associated agents to hypertension. There are no known risk factors for coronary artery disease. Past treatments include calcium channel blockers, ACE inhibitors and diuretics. The current treatment provides significant improvement. There is no history of angina. There is no history of a hypertension causing med or a thyroid problem.   Diabetes  He presents for his follow-up diabetic visit. He has type 2 diabetes mellitus. His disease course has been stable. There are no hypoglycemic associated symptoms. Pertinent negatives for diabetes include no chest pain, no polydipsia, no polyphagia and no polyuria. There are no hypoglycemic complications. Symptoms are stable. There are no diabetic complications. Risk factors for coronary artery disease include diabetes mellitus, hypertension, male sex and obesity. Current diabetic treatment includes oral agent (monotherapy). He is following a generally healthy diet. An ACE inhibitor/angiotensin II receptor blocker is being taken.   Hyperlipidemia  This is a chronic problem. The problem is controlled. Pertinent negatives include no chest pain or myalgias. Current antihyperlipidemic treatment includes statins. Risk factors for coronary artery disease include hypertension, male sex and obesity.     Review of Systems   Constitutional: Negative.    HENT: Negative.     Eyes: Negative.    Respiratory: Negative.      Cardiovascular: Negative.  Negative for chest pain, palpitations and orthopnea.   Gastrointestinal: Negative.    Endocrine: Negative for polydipsia, polyphagia and polyuria.   Genitourinary: Negative.    Musculoskeletal: Negative.  Negative for myalgias.   Integumentary:  Negative.   Neurological: Negative.    Psychiatric/Behavioral: Negative.            Objective     Physical Exam  Vitals and nursing note reviewed.   Constitutional:       General: He is not in acute distress.     Appearance: He is well-developed. He is not diaphoretic.   HENT:      Head: Normocephalic and atraumatic.      Right Ear: External ear normal.      Left Ear: External ear normal.      Nose: Nose normal.      Mouth/Throat:      Pharynx: No oropharyngeal exudate.   Eyes:      General: No scleral icterus.        Right eye: No discharge.         Left eye: No discharge.      Conjunctiva/sclera: Conjunctivae normal.      Pupils: Pupils are equal, round, and reactive to light.   Neck:      Thyroid: No thyromegaly.      Vascular: No JVD.      Trachea: No tracheal deviation.   Cardiovascular:      Rate and Rhythm: Normal rate and regular rhythm.      Heart sounds: Normal heart sounds. No murmur heard.     No friction rub. No gallop.   Pulmonary:      Effort: Pulmonary effort is normal. No respiratory distress.      Breath sounds: Normal breath sounds. No stridor. No wheezing or rales.   Chest:      Chest wall: No tenderness.   Abdominal:      General: Bowel sounds are normal. There is no distension.      Palpations: Abdomen is soft. There is no mass.      Tenderness: There is no abdominal tenderness. There is no guarding or rebound.   Musculoskeletal:         General: No tenderness. Normal range of motion.      Cervical back: Normal range of motion and neck supple.   Feet:      Right foot:      Protective Sensation: 10 sites tested.  10 sites sensed.      Skin integrity: No ulcer, blister, skin breakdown, erythema, warmth, callus, dry skin or  fissure.      Left foot:      Protective Sensation: 10 sites tested.  10 sites sensed.      Skin integrity: No ulcer, blister, skin breakdown, erythema, warmth, callus, dry skin or fissure.   Lymphadenopathy:      Cervical: No cervical adenopathy.   Skin:     General: Skin is warm and dry.      Coloration: Skin is not pale.      Findings: No erythema or rash.   Neurological:      Mental Status: He is alert and oriented to person, place, and time.      Cranial Nerves: No cranial nerve deficit.      Motor: No abnormal muscle tone.      Coordination: Coordination normal.      Deep Tendon Reflexes: Reflexes are normal and symmetric. Reflexes normal.   Psychiatric:         Behavior: Behavior normal.         Thought Content: Thought content normal.         Judgment: Judgment normal.            Assessment and Plan     1. Primary hypertension  Overview:  2005: Diagnosed.    Orders:  -     Cancel: Urinalysis; Future  -     Comprehensive Metabolic Panel; Future; Expected date: 12/12/2024  -     Lipid Panel; Future; Expected date: 12/12/2024  -     TSH; Future; Expected date: 12/12/2024  -     CBC Auto Differential; Future; Expected date: 12/12/2024    2. Coronary artery disease involving native coronary artery of native heart without angina pectoris  Overview:  2008: [facility]: Cath: RCA: Stent.  [place] [facility] [place] LM: 40%. RCA: Stent patent. EF 40-45%.    Orders:  -     Lipid Panel; Future; Expected date: 12/12/2024    3. Hypercholesterolemia  Overview:  2007: Began statin.    Orders:  -     Comprehensive Metabolic Panel; Future; Expected date: 12/12/2024  -     Lipid Panel; Future; Expected date: 12/12/2024    4. Benign essential tremor  -     Ambulatory referral/consult to Neurology; Future; Expected date: 12/19/2024  -     TSH; Future; Expected date: 12/12/2024    5. Microalbuminuria  -     Cancel: Urinalysis; Future  -     Microalbumin/Creatinine Ratio, Urine; Future; Expected date: 12/12/2024    6. Class 1 obesity with  body mass index (BMI) of 30.0 to 30.9 in adult, unspecified obesity type, unspecified whether serious comorbidity present    7. Type 2 diabetes mellitus with hyperglycemia, without long-term current use of insulin  Overview:  3/2014: Diagnosed. Complications: CAD. Medications: Oral agent.    Orders:  -     Hemoglobin A1C; Future; Expected date: 12/12/2024    Continue monitoring blood sugar at home,ADA diet.   Continue monitoring blood pressure at home, low sodium diet.   I spent a total of 42 minutes on the day of the visit.This includes face to face time and non-face to face time preparing to see the patient (eg, review of tests), obtaining and/or reviewing separately obtained history, documenting clinical information in the electronic or other health record, independently interpreting results and communicating results to the patient/family/caregiver, or care coordinator.          Follow up in about 6 months (around 6/12/2025), or if symptoms worsen or fail to improve.

## 2024-12-16 ENCOUNTER — LAB VISIT (OUTPATIENT)
Dept: LAB | Facility: HOSPITAL | Age: 74
End: 2024-12-16
Attending: INTERNAL MEDICINE
Payer: MEDICARE

## 2024-12-16 DIAGNOSIS — Z95.2 HISTORY OF HEART VALVE REPLACEMENT: ICD-10-CM

## 2024-12-16 DIAGNOSIS — Z79.01 CHRONIC ANTICOAGULATION: ICD-10-CM

## 2024-12-16 LAB
INR PPP: 1.7 (ref 0.8–1.2)
PROTHROMBIN TIME: 17.7 SEC (ref 9–12.5)

## 2024-12-16 PROCEDURE — 36415 COLL VENOUS BLD VENIPUNCTURE: CPT | Mod: HCNC,PO | Performed by: INTERNAL MEDICINE

## 2024-12-16 PROCEDURE — 85610 PROTHROMBIN TIME: CPT | Mod: HCNC | Performed by: INTERNAL MEDICINE

## 2024-12-17 ENCOUNTER — TELEPHONE (OUTPATIENT)
Dept: CARDIOLOGY | Facility: CLINIC | Age: 74
End: 2024-12-17
Payer: MEDICARE

## 2024-12-17 NOTE — TELEPHONE ENCOUNTER
Pt notified and verbalized understanding.      ----- Message from Sharad Delgadillo MD sent at 12/16/2024  4:55 PM CST -----  5/23/2024: INR 2.6.     Continue current dose: Warfarin 4.5 mg 7/7.     8/2/2024: INR 2.7.     Continue current dose: Warfarin 4.5 mg 7/7.     10/24/2024: INR 2.3.     Continue current dose: Warfarin 4.5 mg 7/7.     12/2/2024: INR 3.4.     Decrease current dose: Warfarin 4.5 mg 5/7 and warfarin 3 mg 2/7.    12/16/2024: INR 1.7.    Increase current dose: Warfarin 4.5 mg 7/7.     In 2 weeks: Next INR.     Sharad Delgadillo M.D.

## 2025-01-03 ENCOUNTER — LAB VISIT (OUTPATIENT)
Dept: LAB | Facility: HOSPITAL | Age: 75
End: 2025-01-03
Attending: INTERNAL MEDICINE
Payer: MEDICARE

## 2025-01-03 DIAGNOSIS — Z79.01 CHRONIC ANTICOAGULATION: ICD-10-CM

## 2025-01-03 DIAGNOSIS — Z95.2 HISTORY OF HEART VALVE REPLACEMENT: ICD-10-CM

## 2025-01-03 LAB
INR PPP: 2.9 (ref 0.8–1.2)
PROTHROMBIN TIME: 29.9 SEC (ref 9–12.5)

## 2025-01-03 PROCEDURE — 36415 COLL VENOUS BLD VENIPUNCTURE: CPT | Mod: HCNC,PO | Performed by: INTERNAL MEDICINE

## 2025-01-03 PROCEDURE — 85610 PROTHROMBIN TIME: CPT | Mod: HCNC | Performed by: INTERNAL MEDICINE

## 2025-01-06 ENCOUNTER — TELEPHONE (OUTPATIENT)
Dept: CARDIOLOGY | Facility: CLINIC | Age: 75
End: 2025-01-06
Payer: MEDICARE

## 2025-01-06 ENCOUNTER — OFFICE VISIT (OUTPATIENT)
Dept: CARDIOLOGY | Facility: CLINIC | Age: 75
End: 2025-01-06
Attending: INTERNAL MEDICINE
Payer: MEDICARE

## 2025-01-06 VITALS
HEIGHT: 69 IN | DIASTOLIC BLOOD PRESSURE: 56 MMHG | SYSTOLIC BLOOD PRESSURE: 106 MMHG | BODY MASS INDEX: 30.59 KG/M2 | OXYGEN SATURATION: 98 % | HEART RATE: 67 BPM | WEIGHT: 206.56 LBS

## 2025-01-06 DIAGNOSIS — I44.7 LEFT BUNDLE BRANCH BLOCK: ICD-10-CM

## 2025-01-06 DIAGNOSIS — E78.00 HYPERCHOLESTEROLEMIA: ICD-10-CM

## 2025-01-06 DIAGNOSIS — Z95.5 HISTORY OF CORONARY ARTERY STENT PLACEMENT: ICD-10-CM

## 2025-01-06 DIAGNOSIS — E11.59 TYPE 2 DIABETES MELLITUS WITH OTHER CIRCULATORY COMPLICATION, WITHOUT LONG-TERM CURRENT USE OF INSULIN: ICD-10-CM

## 2025-01-06 DIAGNOSIS — I50.22 HEART FAILURE, SYSTOLIC, CHRONIC: ICD-10-CM

## 2025-01-06 DIAGNOSIS — I10 PRIMARY HYPERTENSION: ICD-10-CM

## 2025-01-06 DIAGNOSIS — E66.9 MILD OBESITY: ICD-10-CM

## 2025-01-06 DIAGNOSIS — K21.9 GASTROESOPHAGEAL REFLUX DISEASE WITHOUT ESOPHAGITIS: ICD-10-CM

## 2025-01-06 DIAGNOSIS — I25.10 CORONARY ARTERY DISEASE INVOLVING NATIVE CORONARY ARTERY OF NATIVE HEART WITHOUT ANGINA PECTORIS: ICD-10-CM

## 2025-01-06 DIAGNOSIS — I35.9 AORTIC VALVE DISEASE: ICD-10-CM

## 2025-01-06 DIAGNOSIS — Z95.2 HISTORY OF HEART VALVE REPLACEMENT: ICD-10-CM

## 2025-01-06 DIAGNOSIS — Z79.01 CHRONIC ANTICOAGULATION: ICD-10-CM

## 2025-01-06 PROCEDURE — 3078F DIAST BP <80 MM HG: CPT | Mod: HCNC,CPTII,S$GLB, | Performed by: INTERNAL MEDICINE

## 2025-01-06 PROCEDURE — 93005 ELECTROCARDIOGRAM TRACING: CPT | Mod: HCNC

## 2025-01-06 PROCEDURE — 1160F RVW MEDS BY RX/DR IN RCRD: CPT | Mod: HCNC,CPTII,S$GLB, | Performed by: INTERNAL MEDICINE

## 2025-01-06 PROCEDURE — 93000 ELECTROCARDIOGRAM COMPLETE: CPT | Mod: HCNC,S$GLB,, | Performed by: INTERNAL MEDICINE

## 2025-01-06 PROCEDURE — 1157F ADVNC CARE PLAN IN RCRD: CPT | Mod: HCNC,CPTII,S$GLB, | Performed by: INTERNAL MEDICINE

## 2025-01-06 PROCEDURE — 1125F AMNT PAIN NOTED PAIN PRSNT: CPT | Mod: HCNC,CPTII,S$GLB, | Performed by: INTERNAL MEDICINE

## 2025-01-06 PROCEDURE — 3074F SYST BP LT 130 MM HG: CPT | Mod: HCNC,CPTII,S$GLB, | Performed by: INTERNAL MEDICINE

## 2025-01-06 PROCEDURE — 3288F FALL RISK ASSESSMENT DOCD: CPT | Mod: HCNC,CPTII,S$GLB, | Performed by: INTERNAL MEDICINE

## 2025-01-06 PROCEDURE — 1159F MED LIST DOCD IN RCRD: CPT | Mod: HCNC,CPTII,S$GLB, | Performed by: INTERNAL MEDICINE

## 2025-01-06 PROCEDURE — 99215 OFFICE O/P EST HI 40 MIN: CPT | Mod: HCNC,25,S$GLB, | Performed by: INTERNAL MEDICINE

## 2025-01-06 PROCEDURE — 1101F PT FALLS ASSESS-DOCD LE1/YR: CPT | Mod: HCNC,CPTII,S$GLB, | Performed by: INTERNAL MEDICINE

## 2025-01-06 PROCEDURE — 99999 PR PBB SHADOW E&M-EST. PATIENT-LVL III: CPT | Mod: PBBFAC,HCNC,, | Performed by: INTERNAL MEDICINE

## 2025-01-06 NOTE — PROGRESS NOTES
Subjective:     Kanu Carrero is a 74 y.o.male with hypertension, hypercholesterolemia and diabetes mellitus type 2. He is mildly obese. He has coronary artery disease with mild left main disease and a history of an intervention of the right coronary artery in 2008. He has left bundle branch block. He had moderate to severe aortic stenosis. In the fall of 2013 developed mild exertional chest pressure. He underwent cardiac catheterization on 1/30/2014 which revealed an aortic valve area of 1.1 cm2. No obstructive coronary artery disease was seen with an about 40% left main stenosis. There was mild left ventricular dysfunction. He underwent aortic valve replacement receiving a [employer] mechanical valve on 3/11/2014. He was begun on warfarin with anticoagulation. He was diagnosed with iron deficiency anemia in 10/2016. He underwent an esophagogastroduodenoscopy and colonoscopy that he said were negative. It was decided to discontine aspirin that he was then taking in addition to being anticoagulated. On 11/4/2022 he began empagliflozin. On 12/19/2022 he underwent arthroscopic surgery of the left knee. No exertional chest discomfort or exertional dyspnea. No palpitations or weak spells. No bleeding. No issues with any of his prescribed medications. Feeling well overall.      Coronary Artery Disease  Presents for follow-up visit. Pertinent negatives include no chest pain, chest pressure, chest tightness, dizziness, leg swelling, muscle weakness, palpitations, shortness of breath or weight gain. Risk factors include obesity. The symptoms have been stable.   Congestive Heart Failure  Presents for follow-up visit. Pertinent negatives include no abdominal pain, chest pain, chest pressure, claudication, edema, fatigue, muscle weakness, near-syncope, nocturia, orthopnea, palpitations, paroxysmal nocturnal dyspnea, shortness of breath or unexpected weight change. The symptoms have been stable.   Hypertension  This is a chronic  problem. The current episode started more than 1 year ago. The problem is unchanged. The problem is controlled (usually 115-125/55-60 mmHg at home). Pertinent negatives include no anxiety, blurred vision, chest pain, headaches, malaise/fatigue, neck pain, orthopnea, palpitations, peripheral edema, PND, shortness of breath or sweats. There is no history of chronic renal disease.   Hyperlipidemia  This is a chronic problem. The current episode started more than 1 year ago. The problem is controlled. Recent lipid tests were reviewed and are variable. Exacerbating diseases include diabetes and obesity. He has no history of chronic renal disease, hypothyroidism, liver disease or nephrotic syndrome. Pertinent negatives include no chest pain, focal sensory loss, focal weakness, leg pain, myalgias or shortness of breath.       Review of Systems   Constitutional: Negative for chills, fatigue, fever, malaise/fatigue, unexpected weight change and weight gain.   HENT:  Negative for hoarse voice and nosebleeds.    Eyes:  Negative for blurred vision, pain, vision loss in left eye and vision loss in right eye.   Cardiovascular:  Negative for chest pain, claudication, dyspnea on exertion, irregular heartbeat, leg swelling, near-syncope, orthopnea, palpitations, paroxysmal nocturnal dyspnea and syncope.   Respiratory:  Negative for chest tightness, cough, hemoptysis, shortness of breath and wheezing.    Endocrine: Negative for cold intolerance and heat intolerance.   Hematologic/Lymphatic: Negative for bleeding problem. Does not bruise/bleed easily.   Skin:  Negative for color change and rash.   Musculoskeletal:  Negative for back pain, falls, gout, muscle weakness, myalgias and neck pain.   Gastrointestinal:  Negative for abdominal pain, heartburn, hematemesis, hematochezia, hemorrhoids, jaundice, melena, nausea and vomiting.   Genitourinary:  Negative for dysuria, hematuria and nocturia.   Neurological:  Negative for dizziness,  focal weakness, headaches, light-headedness, loss of balance, numbness, tremors and vertigo.   Psychiatric/Behavioral:  Negative for altered mental status, depression and memory loss. The patient is not nervous/anxious.    Allergic/Immunologic: Negative for hives and persistent infections.       Current Outpatient Medications on File Prior to Visit   Medication Sig Dispense Refill    acetaminophen (TYLENOL) 500 MG tablet Take 500 mg by mouth every 6 (six) hours as needed for Pain.      allopurinoL (ZYLOPRIM) 300 MG tablet Take 300 mg by mouth.      amLODIPine (NORVASC) 10 MG tablet Take 1 tablet (10 mg total) by mouth once daily. 90 tablet 3    chlorthalidone (HYGROTEN) 25 MG Tab Take 1 tablet (25 mg total) by mouth once daily. 90 tablet 3    cyanocobalamin (VITAMIN B-12) 100 MCG tablet Take 100 mcg by mouth once daily.      empagliflozin (JARDIANCE) 10 mg tablet Take 1 tablet (10 mg total) by mouth once daily. 90 tablet 3    EScitalopram oxalate (LEXAPRO) 20 MG tablet Take 1 tablet (20 mg total) by mouth once daily. 90 tablet 4    ezetimibe (ZETIA) 10 mg tablet Take 1 tablet (10 mg total) by mouth once daily. 90 tablet 3    ferrous sulfate 325 (65 FE) MG EC tablet TAKE 1 TABLET BY MOUTH ONCE DAILY WITH BREAKFAST 90 tablet 3    MULTIVITAMIN W-MINERALS/LUTEIN (CENTRUM SILVER ORAL) Take 1 tablet by mouth once daily.      omeprazole (PRILOSEC) 20 MG capsule Take 20 mg by mouth once daily.   11    ramipriL (ALTACE) 10 MG capsule Take 1 capsule (10 mg total) by mouth once daily. 90 capsule 3    rosuvastatin (CRESTOR) 20 MG tablet Take 1 tablet (20 mg total) by mouth once daily. 90 tablet 3    tamsulosin (FLOMAX) 0.4 mg Cap TAKE 1 CAPSULE(0.4 MG) BY MOUTH EVERY DAY 90 capsule 3    vitamin D (VITAMIN D3) 1000 units Tab Take 1,000 Units by mouth once daily.      warfarin (JANTOVEN) 3 MG tablet DOSE TO BE ADJUSTED ACCORDING TO INTERNATIONAL NORMALIZED RATIO. 140 tablet 3     No current facility-administered medications on  "file prior to visit.       BP (!) 106/56   Pulse 67   Ht 5' 9" (1.753 m)   Wt 93.7 kg (206 lb 9.1 oz)   SpO2 98%   BMI 30.51 kg/m²     Objective:     Physical Exam  Constitutional:       General: He is not in acute distress.     Appearance: Normal appearance. He is well-developed. He is not ill-appearing or diaphoretic.   HENT:      Head: Normocephalic and atraumatic.      Nose: Nose normal.   Eyes:      General:         Right eye: No discharge.         Left eye: No discharge.      Conjunctiva/sclera:      Right eye: Right conjunctiva is not injected.      Left eye: Left conjunctiva is not injected.      Pupils: Pupils are equal.      Right eye: Pupil is round.      Left eye: Pupil is round.   Neck:      Thyroid: No thyroid mass or thyromegaly.      Vascular: No carotid bruit or JVD.   Cardiovascular:      Rate and Rhythm: Normal rate and regular rhythm. No extrasystoles are present.     Chest Wall: PMI is not displaced.      Pulses:           Radial pulses are 2+ on the right side and 2+ on the left side.        Femoral pulses are 2+ on the right side and 2+ on the left side.       Dorsalis pedis pulses are 2+ on the right side and 2+ on the left side.        Posterior tibial pulses are 2+ on the right side and 2+ on the left side.      Heart sounds: S1 normal. Murmur heard.      Harsh midsystolic murmur is present with a grade of 3/6 at the upper right sternal border. Mechanical S2.      No gallop.      Comments: Mechanical S2.  Pulmonary:      Effort: Pulmonary effort is normal.      Breath sounds: Normal breath sounds.   Abdominal:      Palpations: Abdomen is soft.      Tenderness: There is no abdominal tenderness.   Musculoskeletal:      Cervical back: Neck supple.      Right ankle: No swelling, deformity or ecchymosis.      Left ankle: No swelling, deformity or ecchymosis.   Lymphadenopathy:      Head:      Right side of head: No submandibular adenopathy.      Left side of head: No submandibular " adenopathy.      Cervical: No cervical adenopathy.   Skin:     General: Skin is warm and dry.   Neurological:      General: No focal deficit present.      Mental Status: He is alert and oriented to person, place, and time. He is not disoriented.      Cranial Nerves: No cranial nerve deficit.   Psychiatric:         Attention and Perception: Attention and perception normal.         Mood and Affect: Mood and affect normal.         Speech: Speech normal.         Behavior: Behavior normal.         Thought Content: Thought content normal.         Cognition and Memory: Cognition and memory normal.         Judgment: Judgment normal.          Assessment:     1. Aortic valve disease    2. History of heart valve replacement    3. Chronic anticoagulation    4. Coronary artery disease involving native coronary artery of native heart without angina pectoris    5. History of coronary artery stent placement    6. Heart failure, systolic, chronic    7. Left bundle branch block    8. Primary hypertension    9. Hypercholesterolemia    10. Type 2 diabetes mellitus with other circulatory complication, without long-term current use of insulin    11. Mild obesity    12. Gastroesophageal reflux disease without esophagitis        Plan:     1. Aortic Valve Disease              5/22/2013: Echo: Mild AS 3.0 m/s.   Fall 2013: Mild to moderate exertional chest pressure.   1/21/2014: Echo: Normal LV size with low normal LV function. EF 50-55%. LBBB. Moderate AS - 3.2 m/s - 1.2 cm2. Mild to moderate MR.              1/21/2014: Carotid Duplex: Mild plaquing.              [place] [facility] [place] Severe aortic stenosis - 1.1 cm2. Mean gradient 47 mm Hg. Mild CAD. Mild LV dysfunction with EF 50%.              [place] [facility] [place]: [employer] Mechanical Valve - 23 mm.   5/28/2014: Echo: Mechanical aortic valve - 2.4 m/s - mild AR.   5/25/2021: Echo: Mechanical AVR - fine - MG 16 mmHg - DI 0.53.   6/14/2024: Echo: [facility] AVR - 3.5 m/s - MG 23 mmHG - DI 0.37  - 1.2 cm2. Mild AR.    On warfarin.   Knows about endocarditis prophylaxis.   Stable.    2. Chronic Anticoagulation   3/2014: Began warfarin for mechanical aortic valve. Goal INR 2-3. Anticoagulation managed by Dr. Delgadillo.    2018: Aspirin 81 mg was discontinued due to iron deficiency anemia.   On warfarin.   INR followed.   No aspirin or NSAIDs.    No obvious bleeding.    3. Coronary Artery Disease   2008: [facility]: Cath: RCA: Stent.   [place] [facility] [place]: LM 40%. RCA: Stent patent. EF 40-45%.              No aspirin as he is anticoagulated and has iron deficiency anemia.              Appears stable.    4. Heart Failure, Systolic and Diastolic, Chronic   2005: Diagnosed. EF 40-45%.   5/22/2013: Echo: EF 50-55%.   5/28/2014: Echo: Normal left ventricular size and function. Mild LVH. Mildly dilated LA. Mechanical aortic valve - 2.4 m/s - mild AR.   9/8/2016: Echo: Normal left ventricular size and systolic function. Mild LVH. Moderate diastolic dysfunction. LBBB. Moderately dilated LA. Mechanical AVR fine - 2.7 m/s.   5/25/2021: Echo: The left ventricle is mildly dilated with low normal systolic function. EF 50%. The mid anteroseptal wall and apical septum are moderately hypokinetic. The basal inferolateral wall is severely hypokinetic. The remainder contract well. LBBB. Mild diastolic dysfunction. Mildly dilated LA. Mechanical AVR - fine - MG 16 mmHg - DI 0.53.   6/5/2023: Echo: Normal left ventricular size with low normal systolic function. LBBB. Apical septum mildly hypokinetic. EF 50%. Mild diastolic dysfunction. [facility] AVR - fine.    6/14/2024: Echo: Normal left ventricular size with low normal systolic function. LBBB. Septum moderately hypokinetic. EF 50%. LVGLS -16%. Mild diastolic dysfunction. [facility] AVR - 3.5 m/s - MG 23 mmHG - DI 0.37 - 1.2 cm2. Mild AR.    3/1/2019: Metoprolol was discontinued due to HR of 46 bpm.    11/4/2022: Empagliflozin 10 mg Q24 was begun.              On empagliflozin 10 mg Q24,  ramipril 10 mg Q24 and chlorthalidone 25 mg Q24.   Well compensated.   6/2025: Plan next Echo. Then every few years.     5. Left Bundle Branch Block              2005: Diagnosed.   9/8/2017: SB. LBBB.  ms.   10/19/2018: SB 52. LBBB with  msec.   3/1/2019: HR 46 bpm.   7/5/2019: HR 68 bpm.   5/20/2021: HR 67.  ms. LBBB with  ms.   7/1/2022: ECG: LBBB.  ms.   12/15/2023: ECG: SR 70. LBBB.  ms.   1/6/2024: ECG: SR 64. LBBB.  ms.               Followed.    Would be a very good candidate for LBBAP or CRT.               6. Hypertension              2005: Diagnosed.   3/1/2019: Metoprolol was discontinued due to HR of 46 bpm.    On amlodipine 10 mg Q24, ramipril 10 mg Q24 and chlorthalidone 25 mg Q24.   Leg edema resolved with diuretic.   Keeping log at home.   Well controlled.     7. Hypercholesterolemia   2007: Began statin.              On atorvastatin 80 mg Q24.   12/16/2016: Chol 134. HDL 35. LDL 74. .   May have had some muscle aches while on atorvastatin.    On rosuvastatin 10 mg Q24.   9/13/2017: Chol 158. HDL 33. LDL 96. .   On rosuvastatin 20 mg Q24.   9/14/2018: Chol 153. HDL 35. LDL 92. .   2/20/2019: Chol 151. HDL 35. LDL 86. .   7/9/2020: Chol 169. HDL 40. LDL 95. .   On rosuvastatin 20 mg Q24.   9/16/2020: Ezetimibe 10 mg Q24 was begun.   On rosuvastatin 20 mg Q24 and ezetimibe 10 mg Q24    11/18/2020: Chol 109. HDL 34. LDL 50. .   6/3/2021: Chol 112. HDL 33. LDL 49. .   6/3/2022: Chol 113. HDL 41. LDL 48. .   12/1/2022: Chol 129. HDL 38. LDL 65. .   6/2/2023: Chol 117. HDL 33. LDL 52. .   11/29/2023: Chol 123. HDL 37. LDL 67. TG 96.   6/10/2024: Chol 112. HDL 31. LDL 53. .   12/6/2024: Chol 113. HDL 39. LDL 50. .   On rosuvastatin 20 mg Q24 and ezetimibe 10 mg Q24    Very favorable lipid panel.     8. Diabetes Mellitus, Type 2   3/2014: Diagnosed. Complications: CAD. Medications: Oral  agent.   11/4/2022: Empagliflozin 10 mg Q24 was begun for HF. Metformin 500 mg Q12 was discontinued.    6/3/2022: HgbA1C 5.8%.   12/1/2022: HgbA1C 5.9%.   6/10/2024: HgbA1C 5.7%.   12/9/2024: HgbA1C 5.6%.   On empagliflozin 10 mg Q24 that was initiated for HF.    Well controlled.     9. Mild Obesity   5/5/2017: Weight 97 kg. BMI 32.   9/8/2017: Weight 95 kg. BMI 31.   6/1/2018: Weight 92 kg. BMI 30.   10/19/2018: Weight 89 kg. BMI 29.   3/1/2019: Weight 94 kg. BMI 31.   11/1/2019: Weight 92 kg. BMI 30.   9/16/2020: Weight 96 kg. BMI 31.   3/13/2023: Weight 89 kg. BMI 29.   12/15/2023: Weight 94 kg. BMI 31.     Encouraged to lose weight.    10. Gastroesophageal Reflux Disease   2008: Diagnosed.   On omeprazole 20 mg Q24.    11. Anemia   10/2016: Diagnosed with iron deficiency anemia.   10/4/2016: EGD & Colonoscopy: Negative.   Receiving iron.    12. History of Knee Surgery   12/19/2022: Arthroscopic surgery of the left knee.    13. Primary Care              Dr. Olvin Hanley.    F/u 4 months.    Sharad Delgadillo M.D.

## 2025-01-06 NOTE — TELEPHONE ENCOUNTER
Pt notified and verbalized understanding.    ----- Message from Sharad Delgadillo MD sent at 1/4/2025  9:24 PM CST -----  5/23/2024: INR 2.6.     Continue current dose: Warfarin 4.5 mg 7/7.     8/2/2024: INR 2.7.     Continue current dose: Warfarin 4.5 mg 7/7.     10/24/2024: INR 2.3.     Continue current dose: Warfarin 4.5 mg 7/7.     12/2/2024: INR 3.4.     Decrease current dose: Warfarin 4.5 mg 5/7 and warfarin 3 mg 2/7.     12/16/2024: INR 1.7.     Increase current dose: Warfarin 4.5 mg 7/7.    1/3/2025: INR 2.9.    Continue current dose: Warfarin 4.5 mg 7/7.     In 2 weeks: Next INR.     Sharad Delgadillo M.D.

## 2025-01-07 LAB
OHS QRS DURATION: 186 MS
OHS QTC CALCULATION: 491 MS

## 2025-02-03 ENCOUNTER — LAB VISIT (OUTPATIENT)
Dept: LAB | Facility: HOSPITAL | Age: 75
End: 2025-02-03
Attending: INTERNAL MEDICINE
Payer: MEDICARE

## 2025-02-03 DIAGNOSIS — Z95.2 HISTORY OF HEART VALVE REPLACEMENT: ICD-10-CM

## 2025-02-03 DIAGNOSIS — Z79.01 CHRONIC ANTICOAGULATION: ICD-10-CM

## 2025-02-03 LAB
INR PPP: 2.6 (ref 0.8–1.2)
PROTHROMBIN TIME: 26.6 SEC (ref 9–12.5)

## 2025-02-03 PROCEDURE — 85610 PROTHROMBIN TIME: CPT | Mod: HCNC | Performed by: INTERNAL MEDICINE

## 2025-02-03 PROCEDURE — 36415 COLL VENOUS BLD VENIPUNCTURE: CPT | Mod: HCNC,PO | Performed by: INTERNAL MEDICINE

## 2025-02-04 ENCOUNTER — TELEPHONE (OUTPATIENT)
Dept: CARDIOLOGY | Facility: CLINIC | Age: 75
End: 2025-02-04
Payer: MEDICARE

## 2025-02-04 NOTE — TELEPHONE ENCOUNTER
Left message on voice mail    ----- Message from Sharad Delgadillo MD sent at 2/3/2025  7:02 PM CST -----  10/24/2024: INR 2.3.     Continue current dose: Warfarin 4.5 mg 7/7.     12/2/2024: INR 3.4.     Decrease current dose: Warfarin 4.5 mg 5/7 and warfarin 3 mg 2/7.     12/16/2024: INR 1.7.     Increase current dose: Warfarin 4.5 mg 7/7.     1/3/2025: INR 2.9.     Continue current dose: Warfarin 4.5 mg 7/7.    2/3/2025: INR 2.6.    Continue current dose: Warfarin 4.5 mg 7/7.     In 4 weeks: Next INR.     Sharad Delgadillo M.D.

## 2025-02-21 DIAGNOSIS — Z00.00 ENCOUNTER FOR MEDICARE ANNUAL WELLNESS EXAM: ICD-10-CM

## 2025-03-03 DIAGNOSIS — F33.1 MODERATE EPISODE OF RECURRENT MAJOR DEPRESSIVE DISORDER: ICD-10-CM

## 2025-03-03 RX ORDER — ESCITALOPRAM OXALATE 20 MG/1
20 TABLET ORAL
Qty: 90 TABLET | Refills: 3 | Status: SHIPPED | OUTPATIENT
Start: 2025-03-03

## 2025-03-03 NOTE — TELEPHONE ENCOUNTER
Refill Decision Note   Kanu Magan  is requesting a refill authorization.  Brief Assessment and Rationale for Refill:  Approve     Medication Therapy Plan:         Comments:     Note composed:7:52 AM 03/03/2025

## 2025-03-03 NOTE — TELEPHONE ENCOUNTER
No care due was identified.  [facility] Embedded Care Due Messages. Reference number: [id number].   3/03/2025 3:37:22 AM CST

## 2025-03-05 ENCOUNTER — RESULTS FOLLOW-UP (OUTPATIENT)
Dept: CARDIOLOGY | Facility: CLINIC | Age: 75
End: 2025-03-05
Payer: MEDICARE

## 2025-03-05 ENCOUNTER — LAB VISIT (OUTPATIENT)
Dept: LAB | Facility: HOSPITAL | Age: 75
End: 2025-03-05
Attending: INTERNAL MEDICINE
Payer: COMMERCIAL

## 2025-03-05 DIAGNOSIS — Z79.01 CHRONIC ANTICOAGULATION: ICD-10-CM

## 2025-03-05 DIAGNOSIS — Z95.2 HISTORY OF HEART VALVE REPLACEMENT: ICD-10-CM

## 2025-03-05 LAB
INR PPP: 2 (ref 0.8–1.2)
PROTHROMBIN TIME: 20.4 SEC (ref 9–12.5)

## 2025-03-05 PROCEDURE — 36415 COLL VENOUS BLD VENIPUNCTURE: CPT | Mod: PO | Performed by: INTERNAL MEDICINE

## 2025-03-05 PROCEDURE — 85610 PROTHROMBIN TIME: CPT | Performed by: INTERNAL MEDICINE

## 2025-03-06 NOTE — TELEPHONE ENCOUNTER
Pt notified to continue current dose of warfarin 4.5 mg 7/7, check INR 4 wks. Pt verbalized understanding.      ----- Message from Sharad Delgadillo MD sent at 3/5/2025  5:49 PM CST -----  10/24/2024: INR 2.3. Continue current dose: Warfarin 4.5 mg 7/7. 12/2/2024: INR 3.4. Decrease current dose: Warfarin 4.5 mg 5/7 and warfarin 3 mg 2/7. 12/16/2024: INR 1.7. Increase current dose:   Warfarin 4.5 mg 7/7. 1/3/2025: INR 2.9. Continue current dose: Warfarin 4.5 mg 7/7.    2/3/2025: INR 2.6.    Continue current dose: Warfarin 4.5 mg 7/7.     3/5/2025: INR 2.0.    Continue current dose: Warfarin 4.5 mg 7/7.     In 4 weeks: Next INR.     Sharad Delgadillo M.D.   ----- Message -----  From: Jerome, [email or web address] [facility] Interface  Sent: 3/5/2025   4:22 PM CST  To: Sharad Delgadillo MD

## 2025-04-08 ENCOUNTER — LAB VISIT (OUTPATIENT)
Dept: LAB | Facility: HOSPITAL | Age: 75
End: 2025-04-08
Attending: INTERNAL MEDICINE

## 2025-04-08 DIAGNOSIS — Z79.01 CHRONIC ANTICOAGULATION: ICD-10-CM

## 2025-04-08 DIAGNOSIS — Z95.2 HISTORY OF HEART VALVE REPLACEMENT: ICD-10-CM

## 2025-04-08 LAB
INR PPP: 3 (ref 0.8–1.2)
PROTHROMBIN TIME: 30.5 SECONDS (ref 9–12.5)

## 2025-04-08 PROCEDURE — 36415 COLL VENOUS BLD VENIPUNCTURE: CPT | Mod: PO

## 2025-04-08 PROCEDURE — 85610 PROTHROMBIN TIME: CPT

## 2025-04-09 ENCOUNTER — TELEPHONE (OUTPATIENT)
Dept: CARDIOLOGY | Facility: CLINIC | Age: 75
End: 2025-04-09

## 2025-04-09 NOTE — TELEPHONE ENCOUNTER
Pt notified and verbalized understanding.    ----- Message from Sharad Delgadillo MD sent at 4/8/2025  8:30 PM CDT -----  10/24/2024: INR 2.3. Continue current dose: Warfarin 4.5 mg 7/7. 12/2/2024: INR 3.4. Decrease current dose: Warfarin 4.5 mg 5/7 and warfarin 3 mg 2/7. 12/16/2024: INR 1.7. Increase current dose:   Warfarin 4.5 mg 7/7. 1/3/2025: INR 2.9. Continue current dose: Warfarin 4.5 mg 7/7.     2/3/2025: INR 2.6.     Continue current dose: Warfarin 4.5 mg 7/7.      3/5/2025: INR 2.0.     Continue current dose: Warfarin 4.5 mg 7/7.     4/8/2025: INR 3.0.    Continue current dose: Warfarin 4.5 mg 7/7.    In 2 weeks: Next INR.    Sharad Delgadillo M.D.          In 4 weeks: Next INR.   ----- Message -----  From: Lab, Background User  Sent: 4/8/2025   7:12 PM CDT  To: Sharad Delgadillo MD

## 2025-04-10 ENCOUNTER — OFFICE VISIT (OUTPATIENT)
Dept: CARDIOLOGY | Facility: CLINIC | Age: 75
End: 2025-04-10
Attending: INTERNAL MEDICINE
Payer: COMMERCIAL

## 2025-04-10 VITALS
OXYGEN SATURATION: 97 % | DIASTOLIC BLOOD PRESSURE: 64 MMHG | WEIGHT: 202.5 LBS | HEIGHT: 69 IN | SYSTOLIC BLOOD PRESSURE: 135 MMHG | BODY MASS INDEX: 29.99 KG/M2 | HEART RATE: 60 BPM

## 2025-04-10 DIAGNOSIS — I35.9 AORTIC VALVE DISEASE: ICD-10-CM

## 2025-04-10 DIAGNOSIS — I10 PRIMARY HYPERTENSION: ICD-10-CM

## 2025-04-10 DIAGNOSIS — I25.10 CORONARY ARTERY DISEASE INVOLVING NATIVE CORONARY ARTERY OF NATIVE HEART WITHOUT ANGINA PECTORIS: ICD-10-CM

## 2025-04-10 DIAGNOSIS — I50.22 HEART FAILURE, SYSTOLIC, CHRONIC: ICD-10-CM

## 2025-04-10 DIAGNOSIS — E66.9 MILD OBESITY: ICD-10-CM

## 2025-04-10 DIAGNOSIS — E11.59 TYPE 2 DIABETES MELLITUS WITH OTHER CIRCULATORY COMPLICATION, WITHOUT LONG-TERM CURRENT USE OF INSULIN: ICD-10-CM

## 2025-04-10 DIAGNOSIS — G45.0 VERTEBROBASILAR ARTERY SYNDROME: ICD-10-CM

## 2025-04-10 DIAGNOSIS — Z95.5 HISTORY OF CORONARY ARTERY STENT PLACEMENT: ICD-10-CM

## 2025-04-10 DIAGNOSIS — Z95.2 HISTORY OF HEART VALVE REPLACEMENT: ICD-10-CM

## 2025-04-10 DIAGNOSIS — E78.00 HYPERCHOLESTEROLEMIA: ICD-10-CM

## 2025-04-10 DIAGNOSIS — Z79.01 CHRONIC ANTICOAGULATION: ICD-10-CM

## 2025-04-10 DIAGNOSIS — Z86.73 HISTORY OF TRANSIENT ISCHEMIC ATTACK: ICD-10-CM

## 2025-04-10 DIAGNOSIS — I44.7 LEFT BUNDLE BRANCH BLOCK: ICD-10-CM

## 2025-04-10 PROCEDURE — 99215 OFFICE O/P EST HI 40 MIN: CPT | Mod: S$PBB,,, | Performed by: INTERNAL MEDICINE

## 2025-04-10 PROCEDURE — 99999 PR PBB SHADOW E&M-EST. PATIENT-LVL IV: CPT | Mod: PBBFAC,,, | Performed by: INTERNAL MEDICINE

## 2025-04-21 ENCOUNTER — LAB VISIT (OUTPATIENT)
Dept: LAB | Facility: HOSPITAL | Age: 75
End: 2025-04-21
Attending: INTERNAL MEDICINE
Payer: COMMERCIAL

## 2025-04-21 DIAGNOSIS — G45.0 VERTEBROBASILAR ARTERY SYNDROME: ICD-10-CM

## 2025-04-21 LAB
CREAT SERPL-MCNC: 0.9 MG/DL (ref 0.5–1.4)
GFR SERPLBLD CREATININE-BSD FMLA CKD-EPI: >60 ML/MIN/1.73/M2

## 2025-04-21 PROCEDURE — 82565 ASSAY OF CREATININE: CPT

## 2025-04-21 PROCEDURE — 36415 COLL VENOUS BLD VENIPUNCTURE: CPT | Mod: PO

## 2025-04-22 ENCOUNTER — TELEPHONE (OUTPATIENT)
Dept: CARDIOLOGY | Facility: CLINIC | Age: 75
End: 2025-04-22

## 2025-04-22 ENCOUNTER — RESULTS FOLLOW-UP (OUTPATIENT)
Dept: CARDIOLOGY | Facility: OTHER | Age: 75
End: 2025-04-22

## 2025-04-22 NOTE — TELEPHONE ENCOUNTER
----- Message from Sharad Delgadillo MD sent at 4/22/2025  7:43 AM CDT -----  Kidney function WNL.    Sharad Delgadillo M.D.    ----- Message -----  From: Lab, Background User  Sent: 4/21/2025   7:06 PM CDT  To: Sharad Delgadillo MD

## 2025-05-05 ENCOUNTER — OFFICE VISIT (OUTPATIENT)
Dept: CARDIOLOGY | Facility: CLINIC | Age: 75
End: 2025-05-05
Attending: INTERNAL MEDICINE
Payer: COMMERCIAL

## 2025-05-05 VITALS
SYSTOLIC BLOOD PRESSURE: 136 MMHG | DIASTOLIC BLOOD PRESSURE: 74 MMHG | WEIGHT: 204.94 LBS | HEART RATE: 66 BPM | BODY MASS INDEX: 30.35 KG/M2 | HEIGHT: 69 IN | OXYGEN SATURATION: 99 %

## 2025-05-05 DIAGNOSIS — Z95.5 HISTORY OF CORONARY ARTERY STENT PLACEMENT: ICD-10-CM

## 2025-05-05 DIAGNOSIS — I35.9 AORTIC VALVE DISEASE: ICD-10-CM

## 2025-05-05 DIAGNOSIS — I44.7 LEFT BUNDLE BRANCH BLOCK: ICD-10-CM

## 2025-05-05 DIAGNOSIS — K21.9 GASTROESOPHAGEAL REFLUX DISEASE WITHOUT ESOPHAGITIS: ICD-10-CM

## 2025-05-05 DIAGNOSIS — Z86.73 HISTORY OF TRANSIENT ISCHEMIC ATTACK: ICD-10-CM

## 2025-05-05 DIAGNOSIS — Z95.2 HISTORY OF HEART VALVE REPLACEMENT: ICD-10-CM

## 2025-05-05 DIAGNOSIS — I50.22 HEART FAILURE, SYSTOLIC, CHRONIC: ICD-10-CM

## 2025-05-05 DIAGNOSIS — I10 PRIMARY HYPERTENSION: ICD-10-CM

## 2025-05-05 DIAGNOSIS — E11.59 TYPE 2 DIABETES MELLITUS WITH OTHER CIRCULATORY COMPLICATION, WITHOUT LONG-TERM CURRENT USE OF INSULIN: ICD-10-CM

## 2025-05-05 DIAGNOSIS — I25.10 CORONARY ARTERY DISEASE INVOLVING NATIVE CORONARY ARTERY OF NATIVE HEART WITHOUT ANGINA PECTORIS: ICD-10-CM

## 2025-05-05 DIAGNOSIS — E66.9 MILD OBESITY: ICD-10-CM

## 2025-05-05 DIAGNOSIS — Z79.01 CHRONIC ANTICOAGULATION: ICD-10-CM

## 2025-05-05 DIAGNOSIS — E78.00 HYPERCHOLESTEROLEMIA: ICD-10-CM

## 2025-05-05 PROCEDURE — 99999 PR PBB SHADOW E&M-EST. PATIENT-LVL III: CPT | Mod: PBBFAC,,, | Performed by: INTERNAL MEDICINE

## 2025-05-05 PROCEDURE — 99215 OFFICE O/P EST HI 40 MIN: CPT | Mod: S$PBB,,, | Performed by: INTERNAL MEDICINE

## 2025-05-05 NOTE — PROGRESS NOTES
Subjective:     Kanu Carrero is a 75 y.o.male with hypertension, hypercholesterolemia and diabetes mellitus type 2. He is mildly obese. He has coronary artery disease with mild left main disease and a history of an intervention of the right coronary artery in 2008. He has left bundle branch block. He had moderate to severe aortic stenosis. In the fall of 2013 developed mild exertional chest pressure. He underwent cardiac catheterization on 1/30/2014 which revealed an aortic valve area of 1.1 cm2. No obstructive coronary artery disease was seen with an about 40% left main stenosis. There was mild left ventricular dysfunction. He underwent aortic valve replacement receiving a [employer] mechanical valve on 3/11/2014. He was begun on warfarin with anticoagulation. He was diagnosed with iron deficiency anemia in 10/2016. He underwent an esophagogastroduodenoscopy and colonoscopy that he said were negative. It was decided to discontine aspirin that he was then taking in addition to being anticoagulated. On 11/4/2022 he began empagliflozin. On 12/19/2022 he underwent arthroscopic surgery of the left knee. In 2/2025 he had loss of vision with right sided hemianopsia for 20 minutes. He was set up for an MRI/MRA, echocardiogram and a carotid duplex but as of 5/5/2025 the tests have not been done. No exertional chest discomfort or exertional dyspnea. No palpitations or weak spells. No bleeding. No issues with any of his prescribed medications. Feeling well overall.      Coronary Artery Disease  Presents for follow-up visit. Pertinent negatives include no chest pain, chest pressure, chest tightness, dizziness, leg swelling, muscle weakness, palpitations, shortness of breath or weight gain. Risk factors include obesity. The symptoms have been stable.   Congestive Heart Failure  Presents for follow-up visit. Pertinent negatives include no abdominal pain, chest pain, chest pressure, claudication, edema, fatigue, muscle weakness,  near-syncope, nocturia, orthopnea, palpitations, paroxysmal nocturnal dyspnea, shortness of breath or unexpected weight change. The symptoms have been stable.   Hypertension  This is a chronic problem. The current episode started more than 1 year ago. The problem is unchanged. The problem is controlled (usually 115-125/55-60 mmHg at home). Pertinent negatives include no anxiety, blurred vision, chest pain, headaches, malaise/fatigue, neck pain, orthopnea, palpitations, peripheral edema, PND, shortness of breath or sweats. There is no history of chronic renal disease.   Hyperlipidemia  This is a chronic problem. The current episode started more than 1 year ago. The problem is controlled. Recent lipid tests were reviewed and are variable. Exacerbating diseases include diabetes and obesity. He has no history of chronic renal disease, hypothyroidism, liver disease or nephrotic syndrome. Pertinent negatives include no chest pain, focal sensory loss, focal weakness, leg pain, myalgias or shortness of breath.       Review of Systems   Constitutional: Negative for chills, fatigue, fever, malaise/fatigue, unexpected weight change and weight gain.   HENT:  Negative for hoarse voice and nosebleeds.    Eyes:  Negative for blurred vision, pain, vision loss in left eye and vision loss in right eye.   Cardiovascular:  Negative for chest pain, claudication, dyspnea on exertion, irregular heartbeat, leg swelling, near-syncope, orthopnea, palpitations, paroxysmal nocturnal dyspnea and syncope.   Respiratory:  Negative for chest tightness, cough, hemoptysis, shortness of breath and wheezing.    Endocrine: Negative for cold intolerance and heat intolerance.   Hematologic/Lymphatic: Negative for bleeding problem. Does not bruise/bleed easily.   Skin:  Negative for color change and rash.   Musculoskeletal:  Negative for back pain, falls, gout, muscle weakness, myalgias and neck pain.   Gastrointestinal:  Negative for abdominal pain,  heartburn, hematemesis, hematochezia, hemorrhoids, jaundice, melena, nausea and vomiting.   Genitourinary:  Negative for dysuria, hematuria and nocturia.   Neurological:  Negative for dizziness, focal weakness, headaches, light-headedness, loss of balance, numbness, tremors and vertigo.   Psychiatric/Behavioral:  Negative for altered mental status, depression and memory loss. The patient is not nervous/anxious.    Allergic/Immunologic: Negative for hives and persistent infections.       Current Outpatient Medications on File Prior to Visit   Medication Sig Dispense Refill    acetaminophen (TYLENOL) 500 MG tablet Take 500 mg by mouth every 6 (six) hours as needed for Pain.      allopurinoL (ZYLOPRIM) 300 MG tablet Take 300 mg by mouth.      amLODIPine (NORVASC) 10 MG tablet Take 1 tablet (10 mg total) by mouth once daily. 90 tablet 3    chlorthalidone (HYGROTEN) 25 MG Tab Take 1 tablet (25 mg total) by mouth once daily. 90 tablet 3    cyanocobalamin (VITAMIN B-12) 100 MCG tablet Take 100 mcg by mouth once daily.      empagliflozin (JARDIANCE) 10 mg tablet Take 1 tablet (10 mg total) by mouth once daily. 90 tablet 3    EScitalopram oxalate (LEXAPRO) 20 MG tablet TAKE 1 TABLET(20 MG) BY MOUTH EVERY DAY 90 tablet 3    ezetimibe (ZETIA) 10 mg tablet Take 1 tablet (10 mg total) by mouth once daily. 90 tablet 3    ferrous sulfate 325 (65 FE) MG EC tablet TAKE 1 TABLET BY MOUTH ONCE DAILY WITH BREAKFAST 90 tablet 3    MULTIVITAMIN W-MINERALS/LUTEIN (CENTRUM SILVER ORAL) Take 1 tablet by mouth once daily.      omeprazole (PRILOSEC) 20 MG capsule Take 20 mg by mouth once daily.   11    ramipriL (ALTACE) 10 MG capsule Take 1 capsule (10 mg total) by mouth once daily. 90 capsule 3    rosuvastatin (CRESTOR) 20 MG tablet Take 1 tablet (20 mg total) by mouth once daily. 90 tablet 3    vitamin D (VITAMIN D3) 1000 units Tab Take 1,000 Units by mouth once daily.      warfarin (JANTOVEN) 3 MG tablet DOSE TO BE ADJUSTED ACCORDING TO  "INTERNATIONAL NORMALIZED RATIO. 140 tablet 3    tamsulosin (FLOMAX) 0.4 mg Cap TAKE 1 CAPSULE(0.4 MG) BY MOUTH EVERY DAY (Patient not taking: Reported on 5/5/2025) 90 capsule 3     No current facility-administered medications on file prior to visit.       /74   Pulse 66   Ht 5' 9" (1.753 m)   Wt 92.9 kg (204 lb 14.7 oz)   SpO2 99%   BMI 30.26 kg/m²     Objective:     Physical Exam  Constitutional:       General: He is not in acute distress.     Appearance: Normal appearance. He is well-developed. He is not ill-appearing or diaphoretic.   HENT:      Head: Normocephalic and atraumatic.      Nose: Nose normal.   Eyes:      General:         Right eye: No discharge.         Left eye: No discharge.      Conjunctiva/sclera:      Right eye: Right conjunctiva is not injected.      Left eye: Left conjunctiva is not injected.      Pupils: Pupils are equal.      Right eye: Pupil is round.      Left eye: Pupil is round.   Neck:      Thyroid: No thyroid mass or thyromegaly.      Vascular: No carotid bruit or JVD.   Cardiovascular:      Rate and Rhythm: Normal rate and regular rhythm. No extrasystoles are present.     Chest Wall: PMI is not displaced.      Pulses:           Radial pulses are 2+ on the right side and 2+ on the left side.        Femoral pulses are 2+ on the right side and 2+ on the left side.       Dorsalis pedis pulses are 2+ on the right side and 2+ on the left side.        Posterior tibial pulses are 2+ on the right side and 2+ on the left side.      Heart sounds: S1 normal. Murmur heard.      Harsh midsystolic murmur is present with a grade of 3/6 at the upper right sternal border. Mechanical S2.      No gallop.      Comments: Mechanical S2.  Pulmonary:      Effort: Pulmonary effort is normal.      Breath sounds: Normal breath sounds.   Abdominal:      Palpations: Abdomen is soft.      Tenderness: There is no abdominal tenderness.   Musculoskeletal:      Cervical back: Neck supple.      Right ankle: " No swelling, deformity or ecchymosis.      Left ankle: No swelling, deformity or ecchymosis.   Lymphadenopathy:      Head:      Right side of head: No submandibular adenopathy.      Left side of head: No submandibular adenopathy.      Cervical: No cervical adenopathy.   Skin:     General: Skin is warm and dry.   Neurological:      General: No focal deficit present.      Mental Status: He is alert and oriented to person, place, and time. He is not disoriented.      Cranial Nerves: No cranial nerve deficit.   Psychiatric:         Attention and Perception: Attention and perception normal.         Mood and Affect: Mood and affect normal.         Speech: Speech normal.         Behavior: Behavior normal.         Thought Content: Thought content normal.         Cognition and Memory: Cognition and memory normal.         Judgment: Judgment normal.          Assessment:     1. Aortic valve disease    2. History of heart valve replacement    3. Chronic anticoagulation    4. Coronary artery disease involving native coronary artery of native heart without angina pectoris    5. History of coronary artery stent placement    6. Heart failure, systolic, chronic    7. Left bundle branch block    8. History of transient ischemic attack    9. Primary hypertension    10. Hypercholesterolemia    11. Type 2 diabetes mellitus with other circulatory complication, without long-term current use of insulin    12. Mild obesity    13. Gastroesophageal reflux disease without esophagitis        Plan:     1. Aortic Valve Disease              5/22/2013: Echo: Mild AS 3.0 m/s.   Fall 2013: Mild to moderate exertional chest pressure.   1/21/2014: Echo: Normal LV size with low normal LV function. EF 50-55%. LBBB. Moderate AS - 3.2 m/s - 1.2 cm2. Mild to moderate MR.              1/21/2014: Carotid Duplex: Mild plaquing.              [place] [facility] [place] Severe [place] stenosis - 1.1 cm2. Mean gradient 47 mm Hg. Mild CAD. Mild LV dysfunction with EF  50%.              [place] [facility] [place] [facility] [facility] Mechanical Valve - 23 mm.   5/28/2014: Echo: Mechanical aortic valve - 2.4 m/s - mild AR.   5/25/2021: Echo: Mechanical AVR - fine - MG 16 mmHg - DI 0.53.   6/14/2024: Echo: [facility] AVR - 3.5 m/s - MG 23 mmHG - DI 0.37 - 1.2 cm2. Mild AR.    On warfarin.   Knows about endocarditis prophylaxis.   Stable.    2. Chronic Anticoagulation   3/2014: Began warfarin for mechanical aortic valve. Goal INR 2-3. Anticoagulation managed by Dr. Delgadillo.    2018: Aspirin 81 mg was discontinued due to iron deficiency anemia.   On warfarin.   INR followed.   No aspirin or NSAIDs.    No obvious bleeding.    3. Coronary Artery Disease   2008: [facility]: Cath: RCA: Stent.   [place] [facility] [place] LM 40%. RCA: Stent patent. EF 40-45%.              No aspirin as he is anticoagulated and had iron deficiency anemia when on aspirin in addition to warfarin.              Appears stable.    4. Heart Failure, Systolic and Diastolic, Chronic   2005: Diagnosed. EF 40-45%.   5/22/2013: Echo: EF 50-55%.   5/28/2014: Echo: Normal left ventricular size and function. Mild LVH. Mildly dilated LA. Mechanical aortic valve - 2.4 m/s - mild AR.   9/8/2016: Echo: Normal left ventricular size and systolic function. Mild LVH. Moderate diastolic dysfunction. LBBB. Moderately dilated LA. Mechanical AVR fine - 2.7 m/s.   5/25/2021: Echo: The left ventricle is mildly dilated with low normal systolic function. EF 50%. The mid anteroseptal wall and apical septum are moderately hypokinetic. The basal inferolateral wall is severely hypokinetic. The remainder contract well. LBBB. Mild diastolic dysfunction. Mildly dilated LA. Mechanical AVR - fine - MG 16 mmHg - DI 0.53.   6/5/2023: Echo: Normal left ventricular size with low normal systolic function. LBBB. Apical septum mildly hypokinetic. EF 50%. Mild diastolic dysfunction. [facility] AVR - fine.    6/14/2024: Echo: Normal left ventricular size with low normal systolic function.  LBBB. Septum moderately hypokinetic. EF 50%. LVGLS -16%. Mild diastolic dysfunction. Mech AVR - 3.5 m/s - MG 23 mmHG - DI 0.37 - 1.2 cm2. Mild AR.    3/1/2019: Metoprolol was discontinued due to HR of 46 bpm.    11/4/2022: Empagliflozin 10 mg Q24 was begun.              On empagliflozin 10 mg Q24, ramipril 10 mg Q24 and chlorthalidone 25 mg Q24.   Denies any exertional SOB.   Well compensated.   6/2025: Plan next Echo. Then every few years.     5. Left Bundle Branch Block              2005: Diagnosed.   9/8/2017: SB. LBBB.  ms.   10/19/2018: SB 52. LBBB with  msec.   3/1/2019: HR 46 bpm.   7/5/2019: HR 68 bpm.   5/20/2021: HR 67.  ms. LBBB with  ms.   7/1/2022: ECG: LBBB.  ms.   12/15/2023: ECG: SR 70. LBBB.  ms.   1/6/2024: ECG: SR 64. LBBB.  ms.               Followed.    Denies any exertional SOB.   Would be a very good candidate for LBBAP or CRT.    6. History of Transient Ischemic Attack    2/2025: Loss of vision - right sided hemianopsia - for 20 minutes.   Sounds like posterior TIA.   4/10/2025: Echo, carotid duplex and MRI of brain werte set up but not done as of 5/5/2025: Do..               7. Hypertension              2005: Diagnosed.   3/1/2019: Metoprolol was discontinued due to HR of 46 bpm.    On amlodipine 10 mg Q24, ramipril 10 mg Q24 and chlorthalidone 25 mg Q24.   Leg edema resolved with diuretic.   Keeping log at home.   Well controlled.     8. Hypercholesterolemia   2007: Began statin.              On atorvastatin 80 mg Q24.   12/16/2016: Chol 134. HDL 35. LDL 74. .   May have had some muscle aches while on atorvastatin.    On rosuvastatin 10 mg Q24.   9/13/2017: Chol 158. HDL 33. LDL 96. .   On rosuvastatin 20 mg Q24.   9/14/2018: Chol 153. HDL 35. LDL 92. .   2/20/2019: Chol 151. HDL 35. LDL 86. .   7/9/2020: Chol 169. HDL 40. LDL 95. .   On rosuvastatin 20 mg Q24.   9/16/2020: Ezetimibe 10 mg Q24 was begun.   On  rosuvastatin 20 mg Q24 and ezetimibe 10 mg Q24    11/18/2020: Chol 109. HDL 34. LDL 50. .   6/3/2021: Chol 112. HDL 33. LDL 49. .   6/3/2022: Chol 113. HDL 41. LDL 48. .   12/1/2022: Chol 129. HDL 38. LDL 65. .   6/2/2023: Chol 117. HDL 33. LDL 52. .   11/29/2023: Chol 123. HDL 37. LDL 67. TG 96.   6/10/2024: Chol 112. HDL 31. LDL 53. .   12/6/2024: Chol 113. HDL 39. LDL 50. .   On rosuvastatin 20 mg Q24 and ezetimibe 10 mg Q24    Very favorable lipid panel.     9. Diabetes Mellitus, Type 2   3/2014: Diagnosed. Complications: CAD. Medications: Oral agent.   11/4/2022: Empagliflozin 10 mg Q24 was begun for HF. Metformin 500 mg Q12 was discontinued.    6/3/2022: HgbA1C 5.8%.   12/1/2022: HgbA1C 5.9%.   6/10/2024: HgbA1C 5.7%.   12/9/2024: HgbA1C 5.6%.   On empagliflozin 10 mg Q24 that was initiated for HF.    Well controlled.     10. Mild Obesity   5/5/2017: Weight 97 kg. BMI 32.   9/8/2017: Weight 95 kg. BMI 31.   6/1/2018: Weight 92 kg. BMI 30.   10/19/2018: Weight 89 kg. BMI 29.   3/1/2019: Weight 94 kg. BMI 31.   11/1/2019: Weight 92 kg. BMI 30.   9/16/2020: Weight 96 kg. BMI 31.   3/13/2023: Weight 89 kg. BMI 29.   12/15/2023: Weight 94 kg. BMI 31.     Encouraged to lose weight.    11. Gastroesophageal Reflux Disease   2008: Diagnosed.   On omeprazole 20 mg Q24.    12. Anemia   10/2016: Diagnosed with iron deficiency anemia.   10/4/2016: EGD & Colonoscopy: Negative.   Receiving iron.    13. History of Knee Surgery   12/19/2022: Arthroscopic surgery of the left knee.    14. Primary Care              Dr. Olvin Hanley.    15. Pre Operative Cardiovascular Evaluation   5/29/2025: Plan is cataract surgery of right eye.   6/2025: Plan is cataract surgery of left eye.   Low cardiac risk.    F/u 3 months.    Sharad Delgadillo M.D.

## 2025-05-06 ENCOUNTER — DOCUMENTATION ONLY (OUTPATIENT)
Dept: CARDIOLOGY | Facility: CLINIC | Age: 75
End: 2025-05-06

## 2025-05-06 NOTE — PROGRESS NOTES
[facility], medical evaluation, DOS 5/29/25, office note faxed to [phone].

## 2025-05-13 ENCOUNTER — HOSPITAL ENCOUNTER (OUTPATIENT)
Dept: CARDIOLOGY | Facility: OTHER | Age: 75
Discharge: HOME | End: 2025-05-13
Attending: INTERNAL MEDICINE
Payer: MEDICARE

## 2025-05-13 VITALS
SYSTOLIC BLOOD PRESSURE: 136 MMHG | HEIGHT: 69 IN | WEIGHT: 204 LBS | HEART RATE: 66 BPM | BODY MASS INDEX: 30.21 KG/M2 | DIASTOLIC BLOOD PRESSURE: 74 MMHG

## 2025-05-13 DIAGNOSIS — G45.0 VERTEBROBASILAR ARTERY SYNDROME: ICD-10-CM

## 2025-05-13 DIAGNOSIS — Z86.73 HISTORY OF TRANSIENT ISCHEMIC ATTACK: ICD-10-CM

## 2025-05-13 LAB
AV INDEX (PROSTH): 0.4
AV MEAN GRADIENT: 23 MMHG
AV PEAK GRADIENT: 41 MMHG
AV VALVE AREA BY VELOCITY RATIO: 1 CM²
AV VALVE AREA: 1.3 CM²
AV VELOCITY RATIO: 0.31
BSA FOR ECHO PROCEDURE: 2.12 M2
CV ECHO LV RWT: 0.41 CM
DOP CALC AO PEAK VEL: 3.2 M/S
DOP CALC AO VTI: 55.6 CM
DOP CALC LVOT AREA: 3.1 CM2
DOP CALC LVOT DIAMETER: 2 CM
DOP CALC LVOT PEAK VEL: 1 M/S
DOP CALC LVOT STROKE VOLUME: 70.3 CM3
DOP CALCLVOT PEAK VEL VTI: 22.4 CM
E WAVE DECELERATION TIME: 253 MSEC
E/A RATIO: 1.01
E/E' RATIO: 11 M/S
ECHO LV POSTERIOR WALL: 1.1 CM (ref 0.6–1.1)
EJECTION FRACTION: 60 %
FRACTIONAL SHORTENING: 48.1 % (ref 28–44)
GLOBAL LONGITUIDAL STRAIN: 15.5 %
INTERVENTRICULAR SEPTUM: 1.1 CM (ref 0.6–1.1)
IVRT: 95 MSEC
LA MAJOR: 4.9 CM
LA MINOR: 5 CM
LA WIDTH: 4 CM
LEFT ARM DIASTOLIC BLOOD PRESSURE: 82 MMHG
LEFT ARM SYSTOLIC BLOOD PRESSURE: 140 MMHG
LEFT ATRIUM SIZE: 4.2 CM
LEFT ATRIUM VOLUME INDEX: 34 ML/M2
LEFT ATRIUM VOLUME: 71 CM3
LEFT CBA DIAS: 11 CM/S
LEFT CBA SYS: 49 CM/S
LEFT CCA DIST DIAS: 16 CM/S
LEFT CCA DIST SYS: 63 CM/S
LEFT CCA MID DIAS: 12 CM/S
LEFT CCA MID SYS: 60 CM/S
LEFT CCA PROX DIAS: 10 CM/S
LEFT CCA PROX SYS: 72 CM/S
LEFT ECA DIAS: 17 CM/S
LEFT ECA SYS: 112 CM/S
LEFT ICA DIST DIAS: 15 CM/S
LEFT ICA DIST SYS: 48 CM/S
LEFT ICA MID DIAS: 16 CM/S
LEFT ICA MID SYS: 64 CM/S
LEFT ICA PROX DIAS: 14 CM/S
LEFT ICA PROX SYS: 51 CM/S
LEFT INTERNAL DIMENSION IN SYSTOLE: 2.8 CM (ref 2.1–4)
LEFT VENTRICLE DIASTOLIC VOLUME INDEX: 67.31 ML/M2
LEFT VENTRICLE DIASTOLIC VOLUME: 140 ML
LEFT VENTRICLE MASS INDEX: 112.9 G/M2
LEFT VENTRICLE SYSTOLIC VOLUME INDEX: 13.9 ML/M2
LEFT VENTRICLE SYSTOLIC VOLUME: 29 ML
LEFT VENTRICULAR INTERNAL DIMENSION IN DIASTOLE: 5.4 CM (ref 3.5–6)
LEFT VENTRICULAR MASS: 234.8 G
LEFT VERTEBRAL DIAS: 15 CM/S
LEFT VERTEBRAL SYS: 47 CM/S
LV LATERAL E/E' RATIO: 9 M/S
LV SEPTAL E/E' RATIO: 14.4 M/S
LVED V (TEICH): 139.65 ML
LVES V (TEICH): 28.64 ML
LVOT MG: 2.44 MMHG
LVOT MV: 0.74 CM/S
MV PEAK A VEL: 0.71 M/S
MV PEAK E VEL: 0.72 M/S
MV STENOSIS PRESSURE HALF TIME: 72.6 MS
MV VALVE AREA P 1/2 METHOD: 3.03 CM2
OHS CV CAROTID RIGHT ICA EDV HIGHEST: 17
OHS CV CAROTID ULTRASOUND LEFT ICA/CCA RATIO: 1.02
OHS CV CAROTID ULTRASOUND RIGHT ICA/CCA RATIO: 0.66
OHS CV PV CAROTID LEFT HIGHEST CCA: 72
OHS CV PV CAROTID LEFT HIGHEST ICA: 64
OHS CV PV CAROTID RIGHT HIGHEST CCA: 82
OHS CV PV CAROTID RIGHT HIGHEST ICA: 54
OHS CV US CAROTID LEFT HIGHEST EDV: 16
PISA TR MAX VEL: 3.2 M/S
PULM VEIN S/D RATIO: 1.15
PV PEAK D VEL: 0.39 M/S
PV PEAK GRADIENT: 4 MMHG
PV PEAK S VEL: 0.45 M/S
PV PEAK VELOCITY: 1.06 M/S
RA MAJOR: 4.84 CM
RA PRESSURE ESTIMATED: 3 MMHG
RA WIDTH: 2.4 CM
RIGHT ARM DIASTOLIC BLOOD PRESSURE: 74 MMHG
RIGHT ARM SYSTOLIC BLOOD PRESSURE: 136 MMHG
RIGHT CBA DIAS: 11 CM/S
RIGHT CBA SYS: 69 CM/S
RIGHT CCA DIST DIAS: 16 CM/S
RIGHT CCA DIST SYS: 82 CM/S
RIGHT CCA MID DIAS: 14 CM/S
RIGHT CCA MID SYS: 76 CM/S
RIGHT CCA PROX DIAS: 10 CM/S
RIGHT CCA PROX SYS: 75 CM/S
RIGHT ECA DIAS: 9 CM/S
RIGHT ECA SYS: 110 CM/S
RIGHT ICA DIST DIAS: 14 CM/S
RIGHT ICA DIST SYS: 46 CM/S
RIGHT ICA MID DIAS: 17 CM/S
RIGHT ICA MID SYS: 51 CM/S
RIGHT ICA PROX SYS: 54 CM/S
RIGHT VERTEBRAL DIAS: 23 CM/S
RIGHT VERTEBRAL SYS: 74 CM/S
RV TB RVSP: 6 MMHG
TDI LATERAL: 0.08 M/S
TDI SEPTAL: 0.05 M/S
TDI: 0.07 M/S
TR MAX PG: 41 MMHG
TV REST PULMONARY ARTERY PRESSURE: 44 MMHG
Z-SCORE OF LEFT VENTRICULAR DIMENSION IN END DIASTOLE: -1.65
Z-SCORE OF LEFT VENTRICULAR DIMENSION IN END SYSTOLE: -2.61

## 2025-05-13 PROCEDURE — 93880 EXTRACRANIAL BILAT STUDY: CPT | Mod: HCNC

## 2025-05-13 PROCEDURE — 93880 EXTRACRANIAL BILAT STUDY: CPT | Mod: 26,HCNC,, | Performed by: INTERNAL MEDICINE

## 2025-05-13 PROCEDURE — 93356 MYOCRD STRAIN IMG SPCKL TRCK: CPT | Mod: HCNC,,, | Performed by: INTERNAL MEDICINE

## 2025-05-13 PROCEDURE — 93356 MYOCRD STRAIN IMG SPCKL TRCK: CPT | Mod: HCNC

## 2025-05-13 PROCEDURE — 93306 TTE W/DOPPLER COMPLETE: CPT | Mod: 26,HCNC,, | Performed by: INTERNAL MEDICINE

## 2025-05-22 ENCOUNTER — LAB VISIT (OUTPATIENT)
Dept: LAB | Facility: HOSPITAL | Age: 75
End: 2025-05-22
Attending: INTERNAL MEDICINE
Payer: MEDICARE

## 2025-05-22 DIAGNOSIS — Z79.01 CHRONIC ANTICOAGULATION: ICD-10-CM

## 2025-05-22 DIAGNOSIS — Z95.2 HISTORY OF HEART VALVE REPLACEMENT: ICD-10-CM

## 2025-05-22 LAB
INR PPP: 1.7 (ref 0.8–1.2)
PROTHROMBIN TIME: 18.2 SECONDS (ref 9–12.5)

## 2025-05-22 PROCEDURE — 85610 PROTHROMBIN TIME: CPT | Mod: HCNC

## 2025-05-22 PROCEDURE — 36415 COLL VENOUS BLD VENIPUNCTURE: CPT | Mod: HCNC,PO

## 2025-05-23 ENCOUNTER — TELEPHONE (OUTPATIENT)
Dept: CARDIOLOGY | Facility: CLINIC | Age: 75
End: 2025-05-23
Payer: MEDICARE

## 2025-05-23 ENCOUNTER — RESULTS FOLLOW-UP (OUTPATIENT)
Dept: CARDIOLOGY | Facility: CLINIC | Age: 75
End: 2025-05-23

## 2025-05-23 NOTE — TELEPHONE ENCOUNTER
Patient notified and verbalized understanding  to Increase current dose: Warfarin 9 mg today. Then warfarin 6 mg 2/7 & warfarin 4.5 mg 5/7.     In 2 weeks: Next INR.           ----- Message from Sharad Delgadillo MD sent at 5/23/2025  7:44 AM CDT -----  2/3/2025: INR 2.6.     Continue current dose: Warfarin 4.5 mg 7/7.      3/5/2025: INR 2.0.     Continue current dose: Warfarin 4.5 mg 7/7.      4/8/2025: INR 3.0.     Continue current dose: Warfarin 4.5 mg 7/7.    5/22/2025: INR 1.7.    Incresae current dose: Warfarin 9 mg today. Then warfarin 6 mg 2/7 & warfarin 4.5 mg 5/7.     In 2 weeks: Next INR.     Sharad Delgadillo M.D.    ----- Message -----  From: Lab, Background User  Sent: 5/22/2025  10:53 PM CDT  To: Sharad Delgadillo MD

## 2025-05-28 ENCOUNTER — TELEPHONE (OUTPATIENT)
Dept: CARDIOLOGY | Facility: CLINIC | Age: 75
End: 2025-05-28
Payer: MEDICARE

## 2025-05-28 NOTE — TELEPHONE ENCOUNTER
----- Message from Gabriel sent at 5/28/2025  8:39 AM CDT -----  Contact: Megan  Type:  Patient CallWho Called: patient Does the patient know what this is regarding?: Requesting a all back to discuss the clearance he need for his procedure which is scheduled for tomorrow ; please advise Would the patient rather a call back or a response via [facility]? Call Best Call Back Number: [phone] Additional Information:

## 2025-06-02 ENCOUNTER — TELEPHONE (OUTPATIENT)
Dept: CARDIOLOGY | Facility: CLINIC | Age: 75
End: 2025-06-02
Payer: MEDICARE

## 2025-06-04 ENCOUNTER — LAB VISIT (OUTPATIENT)
Dept: LAB | Facility: HOSPITAL | Age: 75
End: 2025-06-04
Attending: FAMILY MEDICINE
Payer: MEDICARE

## 2025-06-04 DIAGNOSIS — G25.0 BENIGN ESSENTIAL TREMOR: ICD-10-CM

## 2025-06-04 DIAGNOSIS — I10 PRIMARY HYPERTENSION: ICD-10-CM

## 2025-06-04 DIAGNOSIS — E11.65 TYPE 2 DIABETES MELLITUS WITH HYPERGLYCEMIA, WITHOUT LONG-TERM CURRENT USE OF INSULIN: ICD-10-CM

## 2025-06-04 DIAGNOSIS — R80.9 MICROALBUMINURIA: ICD-10-CM

## 2025-06-04 DIAGNOSIS — E78.00 HYPERCHOLESTEROLEMIA: ICD-10-CM

## 2025-06-04 DIAGNOSIS — I25.10 CORONARY ARTERY DISEASE INVOLVING NATIVE CORONARY ARTERY OF NATIVE HEART WITHOUT ANGINA PECTORIS: ICD-10-CM

## 2025-06-04 LAB
ABSOLUTE EOSINOPHIL (OHS): 0 K/UL
ABSOLUTE MONOCYTE (OHS): 0.82 K/UL (ref 0.3–1)
ABSOLUTE NEUTROPHIL COUNT (OHS): 5.17 K/UL (ref 1.8–7.7)
ALBUMIN SERPL BCP-MCNC: 4.6 G/DL (ref 3.5–5.2)
ALBUMIN/CREAT UR: 48.5 UG/MG
ALP SERPL-CCNC: 57 UNIT/L (ref 40–150)
ALT SERPL W/O P-5'-P-CCNC: 29 UNIT/L (ref 10–44)
ANION GAP (OHS): 10 MMOL/L (ref 8–16)
AST SERPL-CCNC: 24 UNIT/L (ref 11–45)
BASOPHILS # BLD AUTO: 0.06 K/UL
BASOPHILS NFR BLD AUTO: 0.8 %
BILIRUB SERPL-MCNC: 0.4 MG/DL (ref 0.1–1)
BUN SERPL-MCNC: 23 MG/DL (ref 8–23)
CALCIUM SERPL-MCNC: 9.6 MG/DL (ref 8.7–10.5)
CHLORIDE SERPL-SCNC: 103 MMOL/L (ref 95–110)
CHOLEST SERPL-MCNC: 106 MG/DL (ref 120–199)
CHOLEST/HDLC SERPL: 2.9 {RATIO} (ref 2–5)
CO2 SERPL-SCNC: 28 MMOL/L (ref 23–29)
CREAT SERPL-MCNC: 1.1 MG/DL (ref 0.5–1.4)
CREAT UR-MCNC: 130 MG/DL (ref 23–375)
EAG (OHS): 114 MG/DL (ref 68–131)
ERYTHROCYTE [DISTWIDTH] IN BLOOD BY AUTOMATED COUNT: 15.2 % (ref 11.5–14.5)
GFR SERPLBLD CREATININE-BSD FMLA CKD-EPI: >60 ML/MIN/1.73/M2
GLUCOSE SERPL-MCNC: 106 MG/DL (ref 70–110)
HBA1C MFR BLD: 5.6 % (ref 4–5.6)
HCT VFR BLD AUTO: 43.4 % (ref 40–54)
HDLC SERPL-MCNC: 36 MG/DL (ref 40–75)
HDLC SERPL: 34 % (ref 20–50)
HGB BLD-MCNC: 13.8 GM/DL (ref 14–18)
IMM GRANULOCYTES # BLD AUTO: 0.02 K/UL (ref 0–0.04)
IMM GRANULOCYTES NFR BLD AUTO: 0.3 % (ref 0–0.5)
LDLC SERPL CALC-MCNC: 48.8 MG/DL (ref 63–159)
LYMPHOCYTES # BLD AUTO: 1.32 K/UL (ref 1–4.8)
MCH RBC QN AUTO: 28.3 PG (ref 27–31)
MCHC RBC AUTO-ENTMCNC: 31.8 G/DL (ref 32–36)
MCV RBC AUTO: 89 FL (ref 82–98)
MICROALBUMIN UR-MCNC: 63 UG/ML (ref ?–5000)
NONHDLC SERPL-MCNC: 70 MG/DL
NUCLEATED RBC (/100WBC) (OHS): 0 /100 WBC
PLATELET # BLD AUTO: 280 K/UL (ref 150–450)
PMV BLD AUTO: 10.2 FL (ref 9.2–12.9)
POTASSIUM SERPL-SCNC: 4 MMOL/L (ref 3.5–5.1)
PROT SERPL-MCNC: 7.4 GM/DL (ref 6–8.4)
RBC # BLD AUTO: 4.88 M/UL (ref 4.6–6.2)
RELATIVE EOSINOPHIL (OHS): 0 %
RELATIVE LYMPHOCYTE (OHS): 17.9 % (ref 18–48)
RELATIVE MONOCYTE (OHS): 11.1 % (ref 4–15)
RELATIVE NEUTROPHIL (OHS): 69.9 % (ref 38–73)
SODIUM SERPL-SCNC: 141 MMOL/L (ref 136–145)
TRIGL SERPL-MCNC: 106 MG/DL (ref 30–150)
TSH SERPL-ACNC: 1.44 UIU/ML (ref 0.4–4)
WBC # BLD AUTO: 7.39 K/UL (ref 3.9–12.7)

## 2025-06-04 PROCEDURE — 85025 COMPLETE CBC W/AUTO DIFF WBC: CPT | Mod: HCNC

## 2025-06-04 PROCEDURE — 36415 COLL VENOUS BLD VENIPUNCTURE: CPT | Mod: HCNC,PO

## 2025-06-09 ENCOUNTER — LAB VISIT (OUTPATIENT)
Dept: LAB | Facility: HOSPITAL | Age: 75
End: 2025-06-09
Attending: INTERNAL MEDICINE

## 2025-06-09 ENCOUNTER — RESULTS FOLLOW-UP (OUTPATIENT)
Dept: CARDIOLOGY | Facility: CLINIC | Age: 75
End: 2025-06-09

## 2025-06-09 DIAGNOSIS — Z79.01 CHRONIC ANTICOAGULATION: ICD-10-CM

## 2025-06-09 DIAGNOSIS — Z95.2 HISTORY OF HEART VALVE REPLACEMENT: ICD-10-CM

## 2025-06-09 LAB
INR PPP: 3.1 (ref 0.8–1.2)
PROTHROMBIN TIME: 31.7 SECONDS (ref 9–12.5)

## 2025-06-09 PROCEDURE — 85610 PROTHROMBIN TIME: CPT

## 2025-06-09 PROCEDURE — 36415 COLL VENOUS BLD VENIPUNCTURE: CPT | Mod: PO

## 2025-06-10 ENCOUNTER — TELEPHONE (OUTPATIENT)
Dept: CARDIOLOGY | Facility: CLINIC | Age: 75
End: 2025-06-10
Payer: MEDICARE

## 2025-06-10 NOTE — TELEPHONE ENCOUNTER
Pt notified and verbalized understanding to Continue current dose: Warfarin 6 mg 2/7 & warfarin 4.5 mg 5/7.     In 2 weeks: Next INR.     Sharad Delgadillo M.D.          ----- Message from Sharad Delgadillo MD sent at 6/9/2025  4:18 PM CDT -----  2/3/2025: INR 2.6.     Continue current dose: Warfarin 4.5 mg 7/7.      3/5/2025: INR 2.0.     Continue current dose: Warfarin 4.5 mg 7/7.      4/8/2025: INR 3.0.     Continue current dose: Warfarin 4.5 mg 7/7.     5/22/2025: INR 1.7.     Increase current dose: Warfarin 9 mg today. Then warfarin 6 mg 2/7 & warfarin 4.5 mg 5/7.    6/9/2025: INR 3.1.    Continue current dose: Warfarin 6 mg 2/7 & warfarin 4.5 mg 5/7.     In 2 weeks: Next INR.     Sharad Delgadillo M.D.     ----- Message -----  From: Lab, Background User  Sent: 6/9/2025   3:38 PM CDT  To: Sharad Delgadillo MD

## 2025-06-12 ENCOUNTER — OFFICE VISIT (OUTPATIENT)
Dept: FAMILY MEDICINE | Facility: CLINIC | Age: 75
End: 2025-06-12
Attending: INTERNAL MEDICINE
Payer: MEDICARE

## 2025-06-12 VITALS
OXYGEN SATURATION: 95 % | BODY MASS INDEX: 29.62 KG/M2 | SYSTOLIC BLOOD PRESSURE: 110 MMHG | HEIGHT: 69 IN | DIASTOLIC BLOOD PRESSURE: 60 MMHG | WEIGHT: 200 LBS | HEART RATE: 66 BPM

## 2025-06-12 DIAGNOSIS — E11.59 TYPE 2 DIABETES MELLITUS WITH OTHER CIRCULATORY COMPLICATION, WITHOUT LONG-TERM CURRENT USE OF INSULIN: ICD-10-CM

## 2025-06-12 DIAGNOSIS — R97.20 ELEVATED PSA: ICD-10-CM

## 2025-06-12 DIAGNOSIS — Z12.5 SCREENING FOR PROSTATE CANCER: ICD-10-CM

## 2025-06-12 DIAGNOSIS — J06.9 UPPER RESPIRATORY TRACT INFECTION, UNSPECIFIED TYPE: ICD-10-CM

## 2025-06-12 DIAGNOSIS — F33.1 MODERATE EPISODE OF RECURRENT MAJOR DEPRESSIVE DISORDER: ICD-10-CM

## 2025-06-12 DIAGNOSIS — I10 PRIMARY HYPERTENSION: ICD-10-CM

## 2025-06-12 DIAGNOSIS — R80.9 MICROALBUMINURIA: ICD-10-CM

## 2025-06-12 DIAGNOSIS — D53.9 NUTRITIONAL ANEMIA: ICD-10-CM

## 2025-06-12 DIAGNOSIS — I25.10 CORONARY ARTERY DISEASE INVOLVING NATIVE CORONARY ARTERY OF NATIVE HEART WITHOUT ANGINA PECTORIS: Primary | ICD-10-CM

## 2025-06-12 PROCEDURE — 1101F PT FALLS ASSESS-DOCD LE1/YR: CPT | Mod: CPTII,HCNC,S$GLB, | Performed by: FAMILY MEDICINE

## 2025-06-12 PROCEDURE — 3288F FALL RISK ASSESSMENT DOCD: CPT | Mod: CPTII,HCNC,S$GLB, | Performed by: FAMILY MEDICINE

## 2025-06-12 PROCEDURE — 1159F MED LIST DOCD IN RCRD: CPT | Mod: CPTII,HCNC,S$GLB, | Performed by: FAMILY MEDICINE

## 2025-06-12 PROCEDURE — 99999 PR PBB SHADOW E&M-EST. PATIENT-LVL IV: CPT | Mod: PBBFAC,HCNC,, | Performed by: FAMILY MEDICINE

## 2025-06-12 PROCEDURE — 1157F ADVNC CARE PLAN IN RCRD: CPT | Mod: CPTII,HCNC,S$GLB, | Performed by: FAMILY MEDICINE

## 2025-06-12 PROCEDURE — 3078F DIAST BP <80 MM HG: CPT | Mod: CPTII,HCNC,S$GLB, | Performed by: FAMILY MEDICINE

## 2025-06-12 PROCEDURE — 1160F RVW MEDS BY RX/DR IN RCRD: CPT | Mod: CPTII,HCNC,S$GLB, | Performed by: FAMILY MEDICINE

## 2025-06-12 PROCEDURE — 99215 OFFICE O/P EST HI 40 MIN: CPT | Mod: HCNC,S$GLB,, | Performed by: FAMILY MEDICINE

## 2025-06-12 PROCEDURE — 3074F SYST BP LT 130 MM HG: CPT | Mod: CPTII,HCNC,S$GLB, | Performed by: FAMILY MEDICINE

## 2025-06-12 RX ORDER — PROMETHAZINE HYDROCHLORIDE AND DEXTROMETHORPHAN HYDROBROMIDE 6.25; 15 MG/5ML; MG/5ML
5 SYRUP ORAL EVERY 8 HOURS PRN
Qty: 118 ML | Refills: 0 | Status: SHIPPED | OUTPATIENT
Start: 2025-06-12 | End: 2025-06-22

## 2025-06-12 RX ORDER — PREDNISONE 5 MG/1
5 TABLET ORAL 2 TIMES DAILY
Qty: 10 TABLET | Refills: 0 | Status: SHIPPED | OUTPATIENT
Start: 2025-06-12 | End: 2025-06-17

## 2025-06-13 NOTE — PROGRESS NOTES
Subjective     Patient ID: Kanu Carrero is a 75 y.o. male.    Chief Complaint: Hypertension    75 years old male came to the clinic for blood pressure check.  Blood pressure today was stable.  Last A1c was normal.  Patient with evidence microalbuminuria.  No flare ups of coronary artery disease.  Patient with cough and congestion for the last week.  No fever or chills.  Patient with elevated PSA in the past.  No recent follow-up.  Depression currently stable with the medicine.    Hypertension  This is a chronic problem. The current episode started more than 1 year ago. The problem is unchanged. The problem is controlled. Pertinent negatives include no chest pain, orthopnea, palpitations or peripheral edema. There are no associated agents to hypertension. Risk factors for coronary artery disease include male gender and diabetes mellitus. Past treatments include calcium channel blockers, ACE inhibitors and diuretics. The current treatment provides significant improvement. There is no history of angina. There is no history of a hypertension causing med or a thyroid problem.   URI   This is a new problem. The current episode started in the past 7 days. The problem has been unchanged. There has been no fever. The cough is Non-productive. Pertinent negatives include no chest pain. He has tried nothing for the symptoms. The treatment provided no relief.   Depression  Visit Type: follow-up  Patient is not experiencing: depressed mood, palpitations, suicidal ideas and suicidal planning.   Severity: mild   Sleep quality: fair    Diabetes  He presents for his follow-up diabetic visit. He has type 2 diabetes mellitus. His disease course has been stable. There are no hypoglycemic associated symptoms. Pertinent negatives for diabetes include no chest pain, no polydipsia, no polyphagia and no polyuria. There are no hypoglycemic complications. Symptoms are stable. Diabetic complications include heart disease. Risk factors for  coronary artery disease include hypertension, male sex and diabetes mellitus. Current diabetic treatment includes oral agent (monotherapy). He is compliant with treatment all of the time. He is following a generally healthy diet. An ACE inhibitor/angiotensin II receptor blocker is being taken.     Review of Systems   Constitutional: Negative.    HENT: Negative.     Eyes: Negative.    Respiratory: Negative.     Cardiovascular: Negative.  Negative for chest pain, palpitations and orthopnea.   Gastrointestinal: Negative.    Endocrine: Negative for polydipsia, polyphagia and polyuria.   Genitourinary: Negative.    Musculoskeletal: Negative.    Integumentary:  Negative.   Neurological: Negative.    Psychiatric/Behavioral:  Positive for depression. Negative for depressed mood and suicidal ideas.           Objective     Physical Exam  Vitals and nursing note reviewed.   Constitutional:       General: He is not in acute distress.     Appearance: He is well-developed. He is not diaphoretic.   HENT:      Head: Normocephalic and atraumatic.      Right Ear: External ear normal.      Left Ear: External ear normal.      Nose: Nose normal.      Mouth/Throat:      Pharynx: No oropharyngeal exudate.   Eyes:      General: No scleral icterus.        Right eye: No discharge.         Left eye: No discharge.      Conjunctiva/sclera: Conjunctivae normal.      Pupils: Pupils are equal, round, and reactive to light.   Neck:      Thyroid: No thyromegaly.      Vascular: No JVD.      Trachea: No tracheal deviation.   Cardiovascular:      Rate and Rhythm: Normal rate and regular rhythm.      Heart sounds: Normal heart sounds. No murmur heard.     No friction rub. No gallop.   Pulmonary:      Effort: Pulmonary effort is normal. No respiratory distress.      Breath sounds: Normal breath sounds. No stridor. No wheezing or rales.   Chest:      Chest wall: No tenderness.   Abdominal:      General: Bowel sounds are normal. There is no distension.       Palpations: Abdomen is soft. There is no mass.      Tenderness: There is no abdominal tenderness. There is no guarding or rebound.   Musculoskeletal:         General: No tenderness. Normal range of motion.      Cervical back: Normal range of motion and neck supple.   Lymphadenopathy:      Cervical: No cervical adenopathy.   Skin:     General: Skin is warm and dry.      Coloration: Skin is not pale.      Findings: No erythema or rash.   Neurological:      Mental Status: He is alert and oriented to person, place, and time.      Cranial Nerves: No cranial nerve deficit.      Motor: No abnormal muscle tone.      Coordination: Coordination normal.      Deep Tendon Reflexes: Reflexes are normal and symmetric. Reflexes normal.   Psychiatric:         Behavior: Behavior normal.         Thought Content: Thought content normal.         Judgment: Judgment normal.            Assessment and Plan     1. Coronary artery disease involving native coronary artery of native heart without angina pectoris  Overview:  2008: [facility]: Cath: RCA: Stent.  6/8/2010: [facility] [place]: [place]: LM: 40%. RCA: Stent patent. EF 40-45%.    Orders:  -     Lipid Panel; Future; Expected date: 06/12/2025    2. Moderate episode of recurrent major depressive disorder  -     TSH; Future; Expected date: 06/12/2025    3. Type 2 diabetes mellitus with other circulatory complication, without long-term current use of insulin  Overview:  3/2014: Diagnosed. Complications: CAD. Medications: Oral agent.    Orders:  -     CBC Auto Differential; Future; Expected date: 06/12/2025  -     Microalbumin/Creatinine Ratio, Urine; Future; Expected date: 06/12/2025  -     Lipid Panel; Future; Expected date: 06/12/2025  -     Hemoglobin A1C; Future; Expected date: 06/12/2025  -     Comprehensive Metabolic Panel; Future; Expected date: 06/12/2025    4. Primary hypertension  Overview:  2005: Diagnosed.    Orders:  -     CBC Auto Differential; Future; Expected date: 06/12/2025  -     Lipid  Panel; Future; Expected date: 06/12/2025  -     TSH; Future; Expected date: 06/12/2025    5. Nutritional anemia  -     CBC Auto Differential; Future; Expected date: 06/12/2025    6. Screening for prostate cancer  -     PSA, Screening; Future; Expected date: 06/12/2025    7. Elevated PSA  -     PSA, Screening; Future; Expected date: 06/12/2025    8. Microalbuminuria  -     Microalbumin/Creatinine Ratio, Urine; Future; Expected date: 06/12/2025    9. Upper respiratory tract infection, unspecified type  -     promethazine-dextromethorphan (PROMETHAZINE-DM) 6.25-15 mg/5 mL Syrp; Take 5 mLs by mouth every 8 (eight) hours as needed (cough).  Dispense: 118 mL; Refill: 0  -     predniSONE (DELTASONE) 5 MG tablet; Take 1 tablet (5 mg total) by mouth 2 (two) times daily. for 5 days  Dispense: 10 tablet; Refill: 0    I spent a total of 43 minutes on the day of the visit.This includes face to face time and non-face to face time preparing to see the patient (eg, review of tests), obtaining and/or reviewing separately obtained history, documenting clinical information in the electronic or other health record, independently interpreting results and communicating results to the patient/family/caregiver, or care coordinator.   Continue monitoring blood sugar at home,ADA diet.   Continue monitoring blood pressure at home, low sodium diet.          Follow up in about 6 months (around 12/12/2025), or if symptoms worsen or fail to improve.

## 2025-06-23 ENCOUNTER — LAB VISIT (OUTPATIENT)
Dept: LAB | Facility: HOSPITAL | Age: 75
End: 2025-06-23
Attending: INTERNAL MEDICINE

## 2025-06-23 ENCOUNTER — RESULTS FOLLOW-UP (OUTPATIENT)
Dept: CARDIOLOGY | Facility: CLINIC | Age: 75
End: 2025-06-23

## 2025-06-23 DIAGNOSIS — Z95.2 HISTORY OF HEART VALVE REPLACEMENT: ICD-10-CM

## 2025-06-23 DIAGNOSIS — Z79.01 CHRONIC ANTICOAGULATION: ICD-10-CM

## 2025-06-23 LAB
INR PPP: 3.4 (ref 0.8–1.2)
PROTHROMBIN TIME: 34 SECONDS (ref 9–12.5)

## 2025-06-23 PROCEDURE — 85610 PROTHROMBIN TIME: CPT

## 2025-06-23 PROCEDURE — 36415 COLL VENOUS BLD VENIPUNCTURE: CPT | Mod: PO

## 2025-06-24 ENCOUNTER — TELEPHONE (OUTPATIENT)
Dept: CARDIOLOGY | Facility: CLINIC | Age: 75
End: 2025-06-24

## 2025-06-24 NOTE — TELEPHONE ENCOUNTER
Left message on voice mail to reduce current dose: Warfarin 4.5 mg 7/7. Requested pt return may call.     In 2 weeks: Next INR.             ----- Message from Sharad Delgadillo MD sent at 6/23/2025  6:53 PM CDT -----  2/3/2025: INR 2.6.     Continue current dose: Warfarin 4.5 mg 7/7.      3/5/2025: INR 2.0.     Continue current dose: Warfarin 4.5 mg 7/7.      4/8/2025: INR 3.0.     Continue current dose: Warfarin 4.5 mg 7/7.     5/22/2025: INR 1.7.     Increase current dose: Warfarin 9 mg today. Then warfarin 6 mg 2/7 & warfarin 4.5 mg 5/7.     6/9/2025: INR 3.1.     Continue current dose: Warfarin 6 mg 2/7 & warfarin 4.5 mg 5/7.    6/23/2025: INR 3.4.    Reduce current dose: Warfarin 4.5 mg 5/7.     In 2 weeks: Next INR.     Sharad Delgadillo M.D.     ----- Message -----  From: Lab, Background User  Sent: 6/23/2025   4:48 PM CDT  To: Sharad Delgadillo MD

## 2025-07-07 ENCOUNTER — LAB VISIT (OUTPATIENT)
Dept: LAB | Facility: HOSPITAL | Age: 75
End: 2025-07-07
Attending: INTERNAL MEDICINE
Payer: MEDICARE

## 2025-07-07 ENCOUNTER — RESULTS FOLLOW-UP (OUTPATIENT)
Dept: CARDIOLOGY | Facility: OTHER | Age: 75
End: 2025-07-07

## 2025-07-07 DIAGNOSIS — Z79.01 CHRONIC ANTICOAGULATION: ICD-10-CM

## 2025-07-07 DIAGNOSIS — Z95.2 HISTORY OF HEART VALVE REPLACEMENT: ICD-10-CM

## 2025-07-07 LAB
INR PPP: 2.7 (ref 0.8–1.2)
PROTHROMBIN TIME: 27.2 SECONDS (ref 9–12.5)

## 2025-07-07 PROCEDURE — 85610 PROTHROMBIN TIME: CPT | Mod: HCNC

## 2025-07-07 PROCEDURE — 36415 COLL VENOUS BLD VENIPUNCTURE: CPT | Mod: HCNC,PO

## 2025-07-08 ENCOUNTER — TELEPHONE (OUTPATIENT)
Dept: CARDIOLOGY | Facility: CLINIC | Age: 75
End: 2025-07-08
Payer: MEDICARE

## 2025-07-08 NOTE — TELEPHONE ENCOUNTER
Pt notified and verbalized understanding to continue current dose: Warfarin 4.5 mg 7/7.     In 4 weeks: Next INR.        ----- Message from Sharad Delgadillo MD sent at 7/7/2025  5:05 PM CDT -----  4/8/2025: INR 3.0.     Continue current dose: Warfarin 4.5 mg 7/7.     5/22/2025: INR 1.7.     Increase current dose: Warfarin 9 mg today. Then warfarin 6 mg 2/7 & warfarin 4.5 mg 5/7.     6/9/2025: INR 3.1.     Continue current dose: Warfarin 6 mg 2/7 & warfarin 4.5 mg 5/7.     6/23/2025: INR 3.4.     Reduce current dose: Warfarin 4.5 mg 5/7.    7/7/2025: INR 2.7.    Continue current dose: Warfarin 4.5 mg 7/7.     In 4 weeks: Next INR.     Sharad Delgadillo M.D.  ----- Message -----  From: Lab, Background User  Sent: 7/7/2025   4:50 PM CDT  To: Sharad Delgadillo MD

## 2025-07-24 LAB
LEFT EYE DM RETINOPATHY: POSITIVE
RIGHT EYE DM RETINOPATHY: POSITIVE

## 2025-07-25 ENCOUNTER — PATIENT OUTREACH (OUTPATIENT)
Dept: ADMINISTRATIVE | Facility: HOSPITAL | Age: 75
End: 2025-07-25
Payer: MEDICARE

## 2025-07-25 NOTE — PROGRESS NOTES
Health Maintenance Topic(s) Outreach Outcomes & Actions Taken:    Eye Exam - Outreach Outcomes & Actions Taken  : Diabetic Eye External Records Uploaded, Care Team & History Updated if Applicable

## 2025-08-04 ENCOUNTER — LAB VISIT (OUTPATIENT)
Dept: LAB | Facility: HOSPITAL | Age: 75
End: 2025-08-04
Attending: INTERNAL MEDICINE
Payer: OTHER MISCELLANEOUS

## 2025-08-04 DIAGNOSIS — Z95.2 HISTORY OF HEART VALVE REPLACEMENT: ICD-10-CM

## 2025-08-04 DIAGNOSIS — Z79.01 CHRONIC ANTICOAGULATION: ICD-10-CM

## 2025-08-04 LAB
INR PPP: 2.4 (ref 0.8–1.2)
PROTHROMBIN TIME: 25.1 SECONDS (ref 9–12.5)

## 2025-08-04 PROCEDURE — 85610 PROTHROMBIN TIME: CPT

## 2025-08-04 PROCEDURE — 36415 COLL VENOUS BLD VENIPUNCTURE: CPT | Mod: PO

## 2025-08-05 ENCOUNTER — TELEPHONE (OUTPATIENT)
Dept: CARDIOLOGY | Facility: CLINIC | Age: 75
End: 2025-08-05
Payer: MEDICARE

## 2025-08-05 NOTE — TELEPHONE ENCOUNTER
Pt notified and verbalized understanding to continue warfarin 4.5 mg 7/7, recheck INR 4wks.      ----- Message from Sharad Delgadillo MD sent at 8/5/2025  9:10 AM CDT -----  4/8/2025: INR 3.0.     Continue current dose: Warfarin 4.5 mg 7/7.     5/22/2025: INR 1.7.     Increase current dose: Warfarin 9 mg today. Then warfarin 6 mg 2/7 & warfarin 4.5 mg 5/7.     6/9/2025: INR 3.1.     Continue current dose: Warfarin 6 mg 2/7 & warfarin 4.5 mg 5/7.     6/23/2025: INR 3.4.     Reduce current dose: Warfarin 4.5 mg 5/7.     7/7/2025: INR 2.7.     Continue current dose: Warfarin 4.5 mg 5/7.    8/4/2025: INR 2.4.     Continue current dose: Warfarin 4.5 mg 7/7.     In 4 weeks: Next INR.     Sharad Delgadillo M.D.  ----- Message -----  From: Lab, Background User  Sent: 8/4/2025  10:26 PM CDT  To: Sharad Delgadillo MD

## 2025-08-18 RX ORDER — FERROUS SULFATE 325(65) MG
TABLET, DELAYED RELEASE (ENTERIC COATED) ORAL
Qty: 90 TABLET | Refills: 3 | Status: SHIPPED | OUTPATIENT
Start: 2025-08-18

## 2025-09-03 ENCOUNTER — TELEPHONE (OUTPATIENT)
Dept: CARDIOLOGY | Facility: CLINIC | Age: 75
End: 2025-09-03
Payer: MEDICARE